# Patient Record
Sex: FEMALE | Race: WHITE | Employment: OTHER | URBAN - METROPOLITAN AREA
[De-identification: names, ages, dates, MRNs, and addresses within clinical notes are randomized per-mention and may not be internally consistent; named-entity substitution may affect disease eponyms.]

---

## 2020-03-23 ENCOUNTER — APPOINTMENT (OUTPATIENT)
Dept: GENERAL RADIOLOGY | Age: 60
End: 2020-03-23
Attending: STUDENT IN AN ORGANIZED HEALTH CARE EDUCATION/TRAINING PROGRAM
Payer: OTHER GOVERNMENT

## 2020-03-23 ENCOUNTER — APPOINTMENT (OUTPATIENT)
Dept: MRI IMAGING | Age: 60
End: 2020-03-23
Attending: STUDENT IN AN ORGANIZED HEALTH CARE EDUCATION/TRAINING PROGRAM
Payer: OTHER GOVERNMENT

## 2020-03-23 ENCOUNTER — HOSPITAL ENCOUNTER (EMERGENCY)
Age: 60
Discharge: HOME | End: 2020-03-23
Attending: STUDENT IN AN ORGANIZED HEALTH CARE EDUCATION/TRAINING PROGRAM
Payer: OTHER GOVERNMENT

## 2020-03-23 VITALS
WEIGHT: 230 LBS | BODY MASS INDEX: 42.33 KG/M2 | HEART RATE: 91 BPM | RESPIRATION RATE: 18 BRPM | HEIGHT: 62 IN | TEMPERATURE: 97.6 F | DIASTOLIC BLOOD PRESSURE: 72 MMHG | OXYGEN SATURATION: 95 % | SYSTOLIC BLOOD PRESSURE: 164 MMHG

## 2020-03-23 DIAGNOSIS — R42 VERTIGO: Primary | ICD-10-CM

## 2020-03-23 LAB
ALBUMIN SERPL-MCNC: 4 G/DL (ref 3.2–4.6)
ALBUMIN/GLOB SERPL: 1.1 {RATIO} (ref 1.2–3.5)
ALP SERPL-CCNC: 36 U/L (ref 50–136)
ALT SERPL-CCNC: 20 U/L (ref 12–65)
ANION GAP SERPL CALC-SCNC: 12 MMOL/L (ref 7–16)
AST SERPL-CCNC: 28 U/L (ref 15–37)
ATRIAL RATE: 64 BPM
BACTERIA URNS QL MICRO: 0 /HPF
BASOPHILS # BLD: 0.1 K/UL (ref 0–0.2)
BASOPHILS NFR BLD: 1 % (ref 0–2)
BILIRUB SERPL-MCNC: 0.4 MG/DL (ref 0.2–1.1)
BUN SERPL-MCNC: 17 MG/DL (ref 8–23)
CALCIUM SERPL-MCNC: 8.6 MG/DL (ref 8.3–10.4)
CALCULATED P AXIS, ECG09: 55 DEGREES
CALCULATED R AXIS, ECG10: 85 DEGREES
CALCULATED T AXIS, ECG11: 66 DEGREES
CASTS URNS QL MICRO: NORMAL /LPF
CHLORIDE SERPL-SCNC: 106 MMOL/L (ref 98–107)
CO2 SERPL-SCNC: 21 MMOL/L (ref 21–32)
CREAT SERPL-MCNC: 0.59 MG/DL (ref 0.6–1)
DIAGNOSIS, 93000: NORMAL
DIFFERENTIAL METHOD BLD: ABNORMAL
EOSINOPHIL # BLD: 0.4 K/UL (ref 0–0.8)
EOSINOPHIL NFR BLD: 3 % (ref 0.5–7.8)
EPI CELLS #/AREA URNS HPF: NORMAL /HPF
ERYTHROCYTE [DISTWIDTH] IN BLOOD BY AUTOMATED COUNT: 11.6 % (ref 11.9–14.6)
GLOBULIN SER CALC-MCNC: 3.5 G/DL (ref 2.3–3.5)
GLUCOSE SERPL-MCNC: 285 MG/DL (ref 65–100)
HCT VFR BLD AUTO: 42.4 % (ref 35.8–46.3)
HGB BLD-MCNC: 14.2 G/DL (ref 11.7–15.4)
IMM GRANULOCYTES # BLD AUTO: 0.1 K/UL (ref 0–0.5)
IMM GRANULOCYTES NFR BLD AUTO: 1 % (ref 0–5)
LIPASE SERPL-CCNC: 109 U/L (ref 73–393)
LYMPHOCYTES # BLD: 2.6 K/UL (ref 0.5–4.6)
LYMPHOCYTES NFR BLD: 18 % (ref 13–44)
MCH RBC QN AUTO: 31.7 PG (ref 26.1–32.9)
MCHC RBC AUTO-ENTMCNC: 33.5 G/DL (ref 31.4–35)
MCV RBC AUTO: 94.6 FL (ref 79.6–97.8)
MONOCYTES # BLD: 0.7 K/UL (ref 0.1–1.3)
MONOCYTES NFR BLD: 4 % (ref 4–12)
NEUTS SEG # BLD: 11.1 K/UL (ref 1.7–8.2)
NEUTS SEG NFR BLD: 74 % (ref 43–78)
NRBC # BLD: 0 K/UL (ref 0–0.2)
P-R INTERVAL, ECG05: 160 MS
PLATELET # BLD AUTO: 279 K/UL (ref 150–450)
PMV BLD AUTO: 9.5 FL (ref 9.4–12.3)
POTASSIUM SERPL-SCNC: 4.8 MMOL/L (ref 3.5–5.1)
PROT SERPL-MCNC: 7.5 G/DL (ref 6.3–8.2)
Q-T INTERVAL, ECG07: 396 MS
QRS DURATION, ECG06: 70 MS
QTC CALCULATION (BEZET), ECG08: 415 MS
RBC # BLD AUTO: 4.48 M/UL (ref 4.05–5.2)
RBC #/AREA URNS HPF: NORMAL /HPF
SODIUM SERPL-SCNC: 139 MMOL/L (ref 136–145)
VENTRICULAR RATE, ECG03: 66 BPM
WBC # BLD AUTO: 15 K/UL (ref 4.3–11.1)
WBC URNS QL MICRO: NORMAL /HPF

## 2020-03-23 PROCEDURE — 96375 TX/PRO/DX INJ NEW DRUG ADDON: CPT

## 2020-03-23 PROCEDURE — 96374 THER/PROPH/DIAG INJ IV PUSH: CPT

## 2020-03-23 PROCEDURE — 83690 ASSAY OF LIPASE: CPT

## 2020-03-23 PROCEDURE — 74011250637 HC RX REV CODE- 250/637: Performed by: STUDENT IN AN ORGANIZED HEALTH CARE EDUCATION/TRAINING PROGRAM

## 2020-03-23 PROCEDURE — 96372 THER/PROPH/DIAG INJ SC/IM: CPT

## 2020-03-23 PROCEDURE — 99284 EMERGENCY DEPT VISIT MOD MDM: CPT

## 2020-03-23 PROCEDURE — 74011000250 HC RX REV CODE- 250: Performed by: STUDENT IN AN ORGANIZED HEALTH CARE EDUCATION/TRAINING PROGRAM

## 2020-03-23 PROCEDURE — 81003 URINALYSIS AUTO W/O SCOPE: CPT

## 2020-03-23 PROCEDURE — 74011250636 HC RX REV CODE- 250/636: Performed by: STUDENT IN AN ORGANIZED HEALTH CARE EDUCATION/TRAINING PROGRAM

## 2020-03-23 PROCEDURE — 71045 X-RAY EXAM CHEST 1 VIEW: CPT

## 2020-03-23 PROCEDURE — 81015 MICROSCOPIC EXAM OF URINE: CPT

## 2020-03-23 PROCEDURE — 80053 COMPREHEN METABOLIC PANEL: CPT

## 2020-03-23 PROCEDURE — 85025 COMPLETE CBC W/AUTO DIFF WBC: CPT

## 2020-03-23 PROCEDURE — 93005 ELECTROCARDIOGRAM TRACING: CPT | Performed by: STUDENT IN AN ORGANIZED HEALTH CARE EDUCATION/TRAINING PROGRAM

## 2020-03-23 PROCEDURE — 70551 MRI BRAIN STEM W/O DYE: CPT

## 2020-03-23 RX ORDER — MECLIZINE HYDROCHLORIDE 25 MG/1
25 TABLET ORAL
Qty: 20 TAB | Refills: 1 | Status: SHIPPED | OUTPATIENT
Start: 2020-03-23

## 2020-03-23 RX ORDER — METOCLOPRAMIDE HYDROCHLORIDE 5 MG/ML
10 INJECTION INTRAMUSCULAR; INTRAVENOUS
Status: COMPLETED | OUTPATIENT
Start: 2020-03-23 | End: 2020-03-23

## 2020-03-23 RX ORDER — ONDANSETRON 4 MG/1
4 TABLET, ORALLY DISINTEGRATING ORAL
Qty: 8 TAB | Refills: 2 | Status: SHIPPED | OUTPATIENT
Start: 2020-03-23

## 2020-03-23 RX ORDER — DIAZEPAM 5 MG/1
5 TABLET ORAL
Status: COMPLETED | OUTPATIENT
Start: 2020-03-23 | End: 2020-03-23

## 2020-03-23 RX ORDER — ONDANSETRON 2 MG/ML
4 INJECTION INTRAMUSCULAR; INTRAVENOUS
Status: COMPLETED | OUTPATIENT
Start: 2020-03-23 | End: 2020-03-23

## 2020-03-23 RX ORDER — PROMETHAZINE HYDROCHLORIDE 25 MG/1
25 SUPPOSITORY RECTAL
Qty: 12 SUPPOSITORY | Refills: 0 | Status: SHIPPED | OUTPATIENT
Start: 2020-03-23 | End: 2020-03-30

## 2020-03-23 RX ORDER — ONDANSETRON 2 MG/ML
8 INJECTION INTRAMUSCULAR; INTRAVENOUS
Status: DISCONTINUED | OUTPATIENT
Start: 2020-03-23 | End: 2020-03-23

## 2020-03-23 RX ADMIN — ONDANSETRON 4 MG: 2 INJECTION INTRAMUSCULAR; INTRAVENOUS at 11:16

## 2020-03-23 RX ADMIN — PROMETHAZINE HYDROCHLORIDE 12.5 MG: 25 INJECTION INTRAMUSCULAR; INTRAVENOUS at 16:45

## 2020-03-23 RX ADMIN — DIAZEPAM 5 MG: 5 TABLET ORAL at 11:58

## 2020-03-23 RX ADMIN — METOCLOPRAMIDE 10 MG: 5 INJECTION, SOLUTION INTRAMUSCULAR; INTRAVENOUS at 13:52

## 2020-03-23 NOTE — ED NOTES
Upon attempting to placed IV into patient, pt yelled \"God damn lady stop that\". IV attempt stopped and this RN explained to patient that profanity towards staff will not be tolerated. Primary RN Pepito Bella aware of the need for IV and blood work.

## 2020-03-23 NOTE — ED NOTES
I have reviewed discharge instructions with the patient. The patient verbalized understanding. Patient left ED via Discharge Method: wheelchair to Home with spouse Opportunity for questions and clarification provided. Patient given 3 scripts. No esign To continue your aftercare when you leave the hospital, you may receive an automated call from our care team to check in on how you are doing. This is a free service and part of our promise to provide the best care and service to meet your aftercare needs.  If you have questions, or wish to unsubscribe from this service please call [phone]. Thank you for Choosing our [facility] Emergency Department.

## 2020-03-23 NOTE — ED PROVIDER NOTES
79-year-old female patient presents with reports of rotational dizziness, lightheadedness nausea and vomiting. Patient states she woke around 9 AM this morning, symptoms started shortly thereafter. She does note that she was initially feeling okay when she woke. Her symptoms are generally worsened with movement and resolved at rest.  She describes both a lightheaded feeling as if she may faint and rotational dizziness described as movement when she is seated tilt. She denies any blurred, black or double vision. She does note jerking motion of her visual field. There is no numbness, tingling or weakness aside from chronic left-sided weakness and sensation change following a stroke many years ago. Patient denies falls or trauma. She denies any trouble speaking. Denies history of vertigo in the past.  Recent outpatient appendectomy 2 weeks ago per her report. No past medical history on file. No past surgical history on file. No family history on file. Social History Socioeconomic History  Marital status:  Spouse name: Not on file  Number of children: Not on file  Years of education: Not on file  Highest education level: Not on file Occupational History  Not on file Social Needs  Financial resource strain: Not on file  Food insecurity Worry: Not on file Inability: Not on file  Transportation needs Medical: Not on file Non-medical: Not on file Tobacco Use  Smoking status: Not on file Substance and Sexual Activity  Alcohol use: Not on file  Drug use: Not on file  Sexual activity: Not on file Lifestyle  Physical activity Days per week: Not on file Minutes per session: Not on file  Stress: Not on file Relationships  Social connections Talks on phone: Not on file Gets together: Not on file Attends Jain service: Not on file Active member of club or organization: Not on file Attends meetings of clubs or organizations: Not on file Relationship status: Not on file  Intimate partner violence Fear of current or ex partner: Not on file Emotionally abused: Not on file Physically abused: Not on file Forced sexual activity: Not on file Other Topics Concern  Not on file Social History Narrative  Not on file ALLERGIES: Contrast agent [iodine] Review of Systems Constitutional: Negative for chills, diaphoresis and fever. HENT: Negative for congestion, sneezing and sore throat. Eyes: Negative for visual disturbance. Respiratory: Negative for cough, chest tightness, shortness of breath and wheezing. Cardiovascular: Negative for chest pain and leg swelling. Gastrointestinal: Positive for nausea and vomiting. Negative for abdominal pain, blood in stool and diarrhea. Endocrine: Negative for polyuria. Genitourinary: Negative for difficulty urinating, dysuria, flank pain, hematuria and urgency. Musculoskeletal: Negative for back pain, myalgias, neck pain and neck stiffness. Skin: Negative for color change and rash. Neurological: Positive for dizziness and light-headedness. Negative for syncope, speech difficulty, weakness, numbness and headaches. Psychiatric/Behavioral: Negative for behavioral problems. All other systems reviewed and are negative. Vitals:  
 03/23/20 1044 BP: 195/73 Pulse: 80 Resp: 18 Temp: 97.6 °F (36.4 °C) SpO2: 93% Weight: 104.3 kg (230 lb) Height: 5' 2\" (1.575 m) Physical Exam 
Vitals signs and nursing note reviewed. Constitutional:   
   General: She is not in acute distress. Appearance: She is well-developed. She is not diaphoretic. Comments: Alert and oriented to person place and time. No acute distress, speaks in clear, fluid sentences. HENT:  
   Head: Normocephalic and atraumatic.   
   Right Ear: External ear normal.  
   Left Ear: External ear normal.  
 Nose: Nose normal.  
Eyes:  
   Extraocular Movements:  
   Right eye: Nystagmus present. Left eye: Nystagmus present. Pupils: Pupils are equal, round, and reactive to light. Comments: Subtle nystagmus noted, horizontal in nature. No vertical or rotational component. Nystagmus is fatigable and resolves on exam.  
Neck: Musculoskeletal: Normal range of motion. Cardiovascular:  
   Rate and Rhythm: Normal rate and regular rhythm. Heart sounds: Normal heart sounds. No murmur. No friction rub. No gallop. Pulmonary:  
   Effort: Pulmonary effort is normal. No respiratory distress. Breath sounds: Normal breath sounds. No stridor. No decreased breath sounds, wheezing, rhonchi or rales. Chest:  
   Chest wall: No tenderness. Abdominal:  
   General: There is no distension. Palpations: Abdomen is soft. There is no mass. Tenderness: There is no abdominal tenderness. There is no guarding or rebound. Hernia: No hernia is present. Musculoskeletal: Normal range of motion. General: No tenderness or deformity. Skin: 
   General: Skin is warm and dry. Neurological:  
   General: No focal deficit present. Mental Status: She is alert and oriented to person, place, and time. GCS: GCS eye subscore is 4. GCS verbal subscore is 5. GCS motor subscore is 6. Cranial Nerves: Cranial nerves are intact. No cranial nerve deficit. Motor: Motor function is intact. Comments: Patient does have some chronic sensation differences over the left upper and lower extremity as well as some subtle weakness. This is appreciated on exam but baseline per patient report. She has no focal neuro deficits that are new today on my exam.  
 
  
 
MDM Number of Diagnoses or Management Options Diagnosis management comments: Symptoms consistent with vertigo.   Most consistent with benign paroxysmal positional vertigo as patient notes resolution of symptoms when at rest, worsened with movement. Describes symptoms coming in [place]". No identified new focal neuro deficit on exam to suggest central cause. We will medicate with Zofran and 1 dose of Valium. Orders placed for basic labs, EKG. AG obtained on arrival shows normal sinus rhythm without evidence of acute ischemia. Underlying artifact present. 11:22 AM 
 
 
  
Amount and/or Complexity of Data Reviewed Clinical lab tests: ordered and reviewed Tests in the medicine section of CPT®: ordered and reviewed Independent visualization of images, tracings, or specimens: yes Risk of Complications, Morbidity, and/or Mortality Presenting problems: moderate Diagnostic procedures: low Management options: moderate Patient Progress Patient progress: stable Procedures

## 2020-03-23 NOTE — DISCHARGE INSTRUCTIONS
Patient Education        Vertigo: Care Instructions  Your Care Instructions    Vertigo is the feeling that you or your surroundings are moving when there is no actual movement. It is often described as a feeling of spinning, whirling, falling, or tilting. Vertigo may make you vomit or feel nauseated. You may have trouble standing or walking and may lose your balance. Vertigo is often related to an inner ear problem, but it can have other more serious causes. If vertigo continues, you may need more tests to find its cause. Follow-up care is a key part of your treatment and safety. Be sure to make and go to all appointments, and call your doctor if you are having problems. It's also a good idea to know your test results and keep a list of the medicines you take. How can you care for yourself at home? · Do not lie flat on your back. Prop yourself up slightly. This may reduce the spinning feeling. Keep your eyes open. · Move slowly so that you do not fall. · If your doctor recommends medicine, take it exactly as directed. · Do not drive while you are having vertigo. Certain exercises, called Allred-Daroff exercises, can help decrease vertigo. To do Allred-Daroff exercises:  · Sit on the edge of a bed or sofa and quickly lie down on the side that causes the worst vertigo. Lie on your side with your ear down. · Stay in this position for at least 30 seconds or until the vertigo goes away. · Sit up. If this causes vertigo, wait for it to stop. · Repeat the procedure on the other side. · Repeat this 10 times. Do these exercises 2 times a day until the vertigo is gone. When should you call for help? Call 911 anytime you think you may need emergency care. For example, call if:    · You passed out (lost consciousness).     · You have symptoms of a stroke. These may include:  ? Sudden numbness, tingling, weakness, or loss of movement in your face, arm, or leg, especially on only one side of your body.   ? Sudden vision changes. ? Sudden trouble speaking. ? Sudden confusion or trouble understanding simple statements. ? Sudden problems with walking or balance. ? A sudden, severe headache that is different from past headaches.    Call your doctor now or seek immediate medical care if:    · Vertigo occurs with a fever, a headache, or ringing in your ears.     · You have new or increased nausea and vomiting.    Watch closely for changes in your health, and be sure to contact your doctor if:    · Vertigo gets worse or happens more often.     · Vertigo has not gotten better after 2 weeks. Where can you learn more? Go to http://emmy-wesley.info/  Enter Q238 in the search box to learn more about \"Vertigo: Care Instructions. \"  Current as of: July 28, 2019Content Version: 12.4  © 6355-4229 [employer]. Care instructions adapted under license by [employer] (which disclaims liability or warranty for this information). If you have questions about a medical condition or this instruction, always ask your healthcare professional. [place] [address] any warranty or liability for your use of this information. Patient Education        Epley Maneuver at Home for Vertigo: Exercises  Introduction  Vertigo is a spinning or whirling sensation when you move your head. Your doctor may have moved you in different positions to help your vertigo get better faster. This is called the Epley maneuver. Your doctor also may have asked you to do these exercises at home. Do the exercises as often as your doctor recommends. If your vertigo is getting worse, your doctor may have you change the exercise or stop it. Step 1  Step 1   1. Sit on the edge of a bed or sofa. Step 2   1. Turn your head 45 degrees in the direction your doctor told you to. This should be toward the ear that causes the most vertigo for you. In this picture, the woman is turning toward her left ear.     Step 3 1. Tilt yourself backward until you are lying on your back. Your head should still be at a 45-degree turn. Your head should be about midway between looking straight ahead and looking out to your side. Hold for 30 seconds. If you have vertigo, stay in this position until it stops. Step 4   1. Turn your head 90 degrees toward the ear that has the least vertigo. In this picture, the woman is turning to the right because she has vertigo on her left side. The point of your chin should be raised and over your shoulder. Hold for 30 seconds. Step 5   1. Roll onto the side with the least vertigo. You should now be looking at the floor. Hold for 30 seconds. Follow-up care is a key part of your treatment and safety. Be sure to make and go to all appointments, and call your doctor if you are having problems. It's also a good idea to know your test results and keep a list of the medicines you take. Where can you learn more? Go to http://emmy-wesley.info/  Enter P834 in the search box to learn more about \"Epley Maneuver at Home for Vertigo: Exercises. \"  Current as of: November 19, 2019Content Version: 12.4  © 3231-4875 [employer]. Care instructions adapted under license by [employer] (which disclaims liability or warranty for this information). If you have questions about a medical condition or this instruction, always ask your healthcare professional. [place] [address] any warranty or liability for your use of this information.

## 2020-03-23 NOTE — ED TRIAGE NOTES
Pt states she woke up feeling dizziness today. Pt states she is dry heaving as well. Pt denies actual vomiting. Denies diarrhea. Denies abd pain unless she is dry heaving. Pt denies recent travel, exposure to someone who has traveled, exposure to someone who is ill, SOB, cough, or Fever.

## 2022-01-01 ENCOUNTER — APPOINTMENT (OUTPATIENT)
Dept: CT IMAGING | Age: 62
DRG: 871 | End: 2022-01-01
Attending: INTERNAL MEDICINE
Payer: OTHER GOVERNMENT

## 2022-01-01 ENCOUNTER — APPOINTMENT (OUTPATIENT)
Dept: GENERAL RADIOLOGY | Age: 62
DRG: 871 | End: 2022-01-01
Attending: INTERNAL MEDICINE
Payer: OTHER GOVERNMENT

## 2022-01-01 ENCOUNTER — APPOINTMENT (OUTPATIENT)
Dept: GENERAL RADIOLOGY | Age: 62
DRG: 871 | End: 2022-01-01
Attending: EMERGENCY MEDICINE
Payer: OTHER GOVERNMENT

## 2022-01-01 ENCOUNTER — APPOINTMENT (OUTPATIENT)
Dept: GENERAL RADIOLOGY | Age: 62
DRG: 871 | End: 2022-01-01
Attending: STUDENT IN AN ORGANIZED HEALTH CARE EDUCATION/TRAINING PROGRAM
Payer: OTHER GOVERNMENT

## 2022-01-01 ENCOUNTER — HOSPITAL ENCOUNTER (INPATIENT)
Age: 62
LOS: 10 days | DRG: 871 | End: 2022-01-22
Attending: EMERGENCY MEDICINE | Admitting: HOSPITALIST
Payer: OTHER GOVERNMENT

## 2022-01-01 ENCOUNTER — APPOINTMENT (OUTPATIENT)
Dept: NUCLEAR MEDICINE | Age: 62
DRG: 871 | End: 2022-01-01
Attending: STUDENT IN AN ORGANIZED HEALTH CARE EDUCATION/TRAINING PROGRAM
Payer: OTHER GOVERNMENT

## 2022-01-01 ENCOUNTER — APPOINTMENT (OUTPATIENT)
Dept: CT IMAGING | Age: 62
DRG: 871 | End: 2022-01-01
Attending: PHYSICIAN ASSISTANT
Payer: OTHER GOVERNMENT

## 2022-01-01 ENCOUNTER — APPOINTMENT (OUTPATIENT)
Dept: CT IMAGING | Age: 62
DRG: 871 | End: 2022-01-01
Attending: STUDENT IN AN ORGANIZED HEALTH CARE EDUCATION/TRAINING PROGRAM
Payer: OTHER GOVERNMENT

## 2022-01-01 VITALS
SYSTOLIC BLOOD PRESSURE: 121 MMHG | BODY MASS INDEX: 36.14 KG/M2 | HEIGHT: 62 IN | TEMPERATURE: 101.9 F | DIASTOLIC BLOOD PRESSURE: 14 MMHG | WEIGHT: 196.4 LBS | OXYGEN SATURATION: 91 %

## 2022-01-01 DIAGNOSIS — N39.0 URINARY TRACT INFECTION WITHOUT HEMATURIA, SITE UNSPECIFIED: ICD-10-CM

## 2022-01-01 DIAGNOSIS — J18.9 PNEUMONIA OF BOTH LUNGS DUE TO INFECTIOUS ORGANISM, UNSPECIFIED PART OF LUNG: ICD-10-CM

## 2022-01-01 DIAGNOSIS — J96.01 ACUTE RESPIRATORY FAILURE WITH HYPOXIA (HCC): ICD-10-CM

## 2022-01-01 DIAGNOSIS — J69.0 ASPIRATION PNEUMONIA, UNSPECIFIED ASPIRATION PNEUMONIA TYPE, UNSPECIFIED LATERALITY, UNSPECIFIED PART OF LUNG (HCC): ICD-10-CM

## 2022-01-01 DIAGNOSIS — E66.01 CLASS 3 SEVERE OBESITY DUE TO EXCESS CALORIES WITH SERIOUS COMORBIDITY AND BODY MASS INDEX (BMI) OF 40.0 TO 44.9 IN ADULT (HCC): Chronic | ICD-10-CM

## 2022-01-01 DIAGNOSIS — A41.9 SEPSIS, DUE TO UNSPECIFIED ORGANISM, UNSPECIFIED WHETHER ACUTE ORGAN DYSFUNCTION PRESENT (HCC): ICD-10-CM

## 2022-01-01 DIAGNOSIS — E87.20 METABOLIC ACIDOSIS: ICD-10-CM

## 2022-01-01 DIAGNOSIS — I46.9 PEA (PULSELESS ELECTRICAL ACTIVITY) (HCC): ICD-10-CM

## 2022-01-01 DIAGNOSIS — S42.202A CLOSED FRACTURE OF PROXIMAL END OF LEFT HUMERUS, UNSPECIFIED FRACTURE MORPHOLOGY, INITIAL ENCOUNTER: ICD-10-CM

## 2022-01-01 DIAGNOSIS — R09.02 HYPOXIA: Primary | ICD-10-CM

## 2022-01-01 DIAGNOSIS — J18.9 COMMUNITY ACQUIRED PNEUMONIA OF LEFT LOWER LOBE OF LUNG: ICD-10-CM

## 2022-01-01 DIAGNOSIS — G93.40 ENCEPHALOPATHY: ICD-10-CM

## 2022-01-01 LAB
1,3 BETA GLUCAN SER-MCNC: <31 PG/ML
ABO + RH BLD: NORMAL
ADMINISTERED INITIALS, ADMINIT: NORMAL
ALBUMIN SERPL-MCNC: 2.5 G/DL (ref 3.2–4.6)
ALBUMIN SERPL-MCNC: 2.7 G/DL (ref 3.2–4.6)
ALBUMIN SERPL-MCNC: 2.9 G/DL (ref 3.2–4.6)
ALBUMIN/GLOB SERPL: 0.6 {RATIO} (ref 1.2–3.5)
ALBUMIN/GLOB SERPL: 0.7 {RATIO} (ref 1.2–3.5)
ALBUMIN/GLOB SERPL: 0.7 {RATIO} (ref 1.2–3.5)
ALP SERPL-CCNC: 63 U/L (ref 50–136)
ALP SERPL-CCNC: 85 U/L (ref 50–136)
ALP SERPL-CCNC: 85 U/L (ref 50–136)
ALT SERPL-CCNC: 12 U/L (ref 12–65)
ALT SERPL-CCNC: 18 U/L (ref 12–65)
ALT SERPL-CCNC: 29 U/L (ref 12–65)
ANION GAP SERPL CALC-SCNC: 10 MMOL/L (ref 7–16)
ANION GAP SERPL CALC-SCNC: 10 MMOL/L (ref 7–16)
ANION GAP SERPL CALC-SCNC: 12 MMOL/L (ref 7–16)
ANION GAP SERPL CALC-SCNC: 14 MMOL/L (ref 7–16)
ANION GAP SERPL CALC-SCNC: 15 MMOL/L (ref 7–16)
ANION GAP SERPL CALC-SCNC: 17 MMOL/L (ref 7–16)
ANION GAP SERPL CALC-SCNC: 18 MMOL/L (ref 7–16)
ANION GAP SERPL CALC-SCNC: 18 MMOL/L (ref 7–16)
ANION GAP SERPL CALC-SCNC: 19 MMOL/L (ref 7–16)
ANION GAP SERPL CALC-SCNC: 20 MMOL/L (ref 7–16)
ANION GAP SERPL CALC-SCNC: 21 MMOL/L (ref 7–16)
ANION GAP SERPL CALC-SCNC: 22 MMOL/L (ref 7–16)
ANION GAP SERPL CALC-SCNC: 23 MMOL/L (ref 7–16)
ANION GAP SERPL CALC-SCNC: 25 MMOL/L (ref 7–16)
ANION GAP SERPL CALC-SCNC: 7 MMOL/L (ref 7–16)
ANION GAP SERPL CALC-SCNC: 7 MMOL/L (ref 7–16)
ANION GAP SERPL CALC-SCNC: 8 MMOL/L (ref 7–16)
ANION GAP SERPL CALC-SCNC: 9 MMOL/L (ref 7–16)
APPEARANCE UR: ABNORMAL
APPEARANCE UR: CLEAR
APTT PPP: 23.7 SEC (ref 24.1–35.1)
ARTERIAL PATENCY WRIST A: POSITIVE
AST SERPL-CCNC: 31 U/L (ref 15–37)
AST SERPL-CCNC: 34 U/L (ref 15–37)
AST SERPL-CCNC: 8 U/L (ref 15–37)
ATRIAL RATE: 93 BPM
BACTERIA SPEC CULT: ABNORMAL
BACTERIA SPEC CULT: ABNORMAL
BACTERIA SPEC CULT: NORMAL
BACTERIA SPEC CULT: NORMAL
BACTERIA URNS QL MICRO: 0 /HPF
BACTERIA URNS QL MICRO: ABNORMAL /HPF
BASE DEFICIT BLD-SCNC: 15.1 MMOL/L
BASE DEFICIT BLD-SCNC: 22.4 MMOL/L
BASE DEFICIT BLD-SCNC: 23 MMOL/L
BASE DEFICIT BLD-SCNC: 6.5 MMOL/L
BASE DEFICIT BLD-SCNC: 8.8 MMOL/L
BASE EXCESS BLD CALC-SCNC: 2.5 MMOL/L
BASE EXCESS BLD CALC-SCNC: 2.6 MMOL/L
BASOPHILS # BLD: 0.1 K/UL (ref 0–0.2)
BASOPHILS NFR BLD: 0 % (ref 0–2)
BDY SITE: ABNORMAL
BILIRUB SERPL-MCNC: 0.3 MG/DL (ref 0.2–1.1)
BILIRUB SERPL-MCNC: 0.4 MG/DL (ref 0.2–1.1)
BILIRUB SERPL-MCNC: 0.8 MG/DL (ref 0.2–1.1)
BILIRUB UR QL: NEGATIVE
BILIRUB UR QL: NEGATIVE
BLOOD GROUP ANTIBODIES SERPL: NORMAL
BUN SERPL-MCNC: 13 MG/DL (ref 8–23)
BUN SERPL-MCNC: 14 MG/DL (ref 8–23)
BUN SERPL-MCNC: 14 MG/DL (ref 8–23)
BUN SERPL-MCNC: 17 MG/DL (ref 8–23)
BUN SERPL-MCNC: 17 MG/DL (ref 8–23)
BUN SERPL-MCNC: 18 MG/DL (ref 8–23)
BUN SERPL-MCNC: 18 MG/DL (ref 8–23)
BUN SERPL-MCNC: 20 MG/DL (ref 8–23)
BUN SERPL-MCNC: 21 MG/DL (ref 8–23)
BUN SERPL-MCNC: 22 MG/DL (ref 8–23)
BUN SERPL-MCNC: 22 MG/DL (ref 8–23)
BUN SERPL-MCNC: 23 MG/DL (ref 8–23)
BUN SERPL-MCNC: 24 MG/DL (ref 8–23)
BUN SERPL-MCNC: 25 MG/DL (ref 8–23)
BUN SERPL-MCNC: 26 MG/DL (ref 8–23)
BUN SERPL-MCNC: 27 MG/DL (ref 8–23)
BUN SERPL-MCNC: 31 MG/DL (ref 8–23)
BUN SERPL-MCNC: 35 MG/DL (ref 8–23)
BUN SERPL-MCNC: 47 MG/DL (ref 8–23)
CA-I BLD-MCNC: 1.21 MMOL/L (ref 1.12–1.32)
CALCIUM SERPL-MCNC: 8.4 MG/DL (ref 8.3–10.4)
CALCIUM SERPL-MCNC: 8.5 MG/DL (ref 8.3–10.4)
CALCIUM SERPL-MCNC: 8.6 MG/DL (ref 8.3–10.4)
CALCIUM SERPL-MCNC: 8.7 MG/DL (ref 8.3–10.4)
CALCIUM SERPL-MCNC: 8.8 MG/DL (ref 8.3–10.4)
CALCIUM SERPL-MCNC: 8.9 MG/DL (ref 8.3–10.4)
CALCIUM SERPL-MCNC: 9 MG/DL (ref 8.3–10.4)
CALCIUM SERPL-MCNC: 9 MG/DL (ref 8.3–10.4)
CALCIUM SERPL-MCNC: 9.1 MG/DL (ref 8.3–10.4)
CALCIUM SERPL-MCNC: 9.2 MG/DL (ref 8.3–10.4)
CALCIUM SERPL-MCNC: 9.2 MG/DL (ref 8.3–10.4)
CALCIUM SERPL-MCNC: 9.3 MG/DL (ref 8.3–10.4)
CALCIUM SERPL-MCNC: 9.5 MG/DL (ref 8.3–10.4)
CALCIUM SERPL-MCNC: 9.5 MG/DL (ref 8.3–10.4)
CALCULATED P AXIS, ECG09: 58 DEGREES
CALCULATED R AXIS, ECG10: 124 DEGREES
CALCULATED T AXIS, ECG11: 64 DEGREES
CASTS URNS QL MICRO: 0 /LPF
CASTS URNS QL MICRO: ABNORMAL /LPF
CHLORIDE SERPL-SCNC: 102 MMOL/L (ref 98–107)
CHLORIDE SERPL-SCNC: 106 MMOL/L (ref 98–107)
CHLORIDE SERPL-SCNC: 106 MMOL/L (ref 98–107)
CHLORIDE SERPL-SCNC: 108 MMOL/L (ref 98–107)
CHLORIDE SERPL-SCNC: 109 MMOL/L (ref 98–107)
CHLORIDE SERPL-SCNC: 110 MMOL/L (ref 98–107)
CHLORIDE SERPL-SCNC: 110 MMOL/L (ref 98–107)
CHLORIDE SERPL-SCNC: 111 MMOL/L (ref 98–107)
CHLORIDE SERPL-SCNC: 111 MMOL/L (ref 98–107)
CHLORIDE SERPL-SCNC: 112 MMOL/L (ref 98–107)
CHLORIDE SERPL-SCNC: 113 MMOL/L (ref 98–107)
CHLORIDE SERPL-SCNC: 115 MMOL/L (ref 98–107)
CHLORIDE SERPL-SCNC: 115 MMOL/L (ref 98–107)
CHLORIDE SERPL-SCNC: 116 MMOL/L (ref 98–107)
CHLORIDE SERPL-SCNC: 116 MMOL/L (ref 98–107)
CHLORIDE SERPL-SCNC: 117 MMOL/L (ref 98–107)
CHLORIDE SERPL-SCNC: 119 MMOL/L (ref 98–107)
CO2 BLD-SCNC: 16 MMOL/L (ref 13–23)
CO2 BLD-SCNC: 6 MMOL/L (ref 13–23)
CO2 SERPL-SCNC: 10 MMOL/L (ref 21–32)
CO2 SERPL-SCNC: 11 MMOL/L (ref 21–32)
CO2 SERPL-SCNC: 12 MMOL/L (ref 21–32)
CO2 SERPL-SCNC: 12 MMOL/L (ref 21–32)
CO2 SERPL-SCNC: 13 MMOL/L (ref 21–32)
CO2 SERPL-SCNC: 15 MMOL/L (ref 21–32)
CO2 SERPL-SCNC: 18 MMOL/L (ref 21–32)
CO2 SERPL-SCNC: 19 MMOL/L (ref 21–32)
CO2 SERPL-SCNC: 20 MMOL/L (ref 21–32)
CO2 SERPL-SCNC: 21 MMOL/L (ref 21–32)
CO2 SERPL-SCNC: 21 MMOL/L (ref 21–32)
CO2 SERPL-SCNC: 25 MMOL/L (ref 21–32)
CO2 SERPL-SCNC: 26 MMOL/L (ref 21–32)
CO2 SERPL-SCNC: 27 MMOL/L (ref 21–32)
CO2 SERPL-SCNC: 28 MMOL/L (ref 21–32)
CO2 SERPL-SCNC: 28 MMOL/L (ref 21–32)
CO2 SERPL-SCNC: 29 MMOL/L (ref 21–32)
CO2 SERPL-SCNC: 6 MMOL/L (ref 21–32)
CO2 SERPL-SCNC: 7 MMOL/L (ref 21–32)
COLOR UR: YELLOW
COLOR UR: YELLOW
COVID-19 RAPID TEST, COVR: NOT DETECTED
CREAT SERPL-MCNC: 0.31 MG/DL (ref 0.6–1)
CREAT SERPL-MCNC: 0.38 MG/DL (ref 0.6–1)
CREAT SERPL-MCNC: 0.42 MG/DL (ref 0.6–1)
CREAT SERPL-MCNC: 0.49 MG/DL (ref 0.6–1)
CREAT SERPL-MCNC: 0.53 MG/DL (ref 0.6–1)
CREAT SERPL-MCNC: 0.57 MG/DL (ref 0.6–1)
CREAT SERPL-MCNC: 0.59 MG/DL (ref 0.6–1)
CREAT SERPL-MCNC: 0.6 MG/DL (ref 0.6–1)
CREAT SERPL-MCNC: 0.63 MG/DL (ref 0.6–1)
CREAT SERPL-MCNC: 0.63 MG/DL (ref 0.6–1)
CREAT SERPL-MCNC: 0.65 MG/DL (ref 0.6–1)
CREAT SERPL-MCNC: 0.68 MG/DL (ref 0.6–1)
CREAT SERPL-MCNC: 0.7 MG/DL (ref 0.6–1)
CREAT SERPL-MCNC: 0.7 MG/DL (ref 0.6–1)
CREAT SERPL-MCNC: 0.72 MG/DL (ref 0.6–1)
CREAT SERPL-MCNC: 0.76 MG/DL (ref 0.6–1)
CREAT SERPL-MCNC: 0.79 MG/DL (ref 0.6–1)
CREAT SERPL-MCNC: 0.82 MG/DL (ref 0.6–1)
CREAT SERPL-MCNC: 0.84 MG/DL (ref 0.6–1)
CREAT SERPL-MCNC: 0.84 MG/DL (ref 0.6–1)
CREAT SERPL-MCNC: 0.86 MG/DL (ref 0.6–1)
CREAT SERPL-MCNC: 0.9 MG/DL (ref 0.6–1)
CREAT SERPL-MCNC: 0.94 MG/DL (ref 0.6–1)
CRP SERPL-MCNC: 0.5 MG/DL (ref 0–0.9)
CRP SERPL-MCNC: 1.4 MG/DL (ref 0–0.9)
CRYSTALS URNS QL MICRO: ABNORMAL /LPF
D DIMER PPP FEU-MCNC: 1.88 UG/ML(FEU)
D DIMER PPP FEU-MCNC: 5.65 UG/ML(FEU)
D50 ADMINISTERED, D50ADM: 0 ML
D50 ORDER, D50ORD: 0 ML
DIAGNOSIS, 93000: NORMAL
DIFFERENTIAL METHOD BLD: ABNORMAL
EOSINOPHIL # BLD: 0.1 K/UL (ref 0–0.8)
EOSINOPHIL NFR BLD: 1 % (ref 0.5–7.8)
EPI CELLS #/AREA URNS HPF: 0 /HPF
EPI CELLS #/AREA URNS HPF: ABNORMAL /HPF
ERYTHROCYTE [DISTWIDTH] IN BLOOD BY AUTOMATED COUNT: 14.2 % (ref 11.9–14.6)
ERYTHROCYTE [DISTWIDTH] IN BLOOD BY AUTOMATED COUNT: 14.5 % (ref 11.9–14.6)
ERYTHROCYTE [DISTWIDTH] IN BLOOD BY AUTOMATED COUNT: 14.6 % (ref 11.9–14.6)
ERYTHROCYTE [DISTWIDTH] IN BLOOD BY AUTOMATED COUNT: 14.7 % (ref 11.9–14.6)
ERYTHROCYTE [DISTWIDTH] IN BLOOD BY AUTOMATED COUNT: 14.7 % (ref 11.9–14.6)
ERYTHROCYTE [DISTWIDTH] IN BLOOD BY AUTOMATED COUNT: 14.8 % (ref 11.9–14.6)
ERYTHROCYTE [DISTWIDTH] IN BLOOD BY AUTOMATED COUNT: 14.9 % (ref 11.9–14.6)
ERYTHROCYTE [DISTWIDTH] IN BLOOD BY AUTOMATED COUNT: 15 % (ref 11.9–14.6)
ERYTHROCYTE [DISTWIDTH] IN BLOOD BY AUTOMATED COUNT: 15 % (ref 11.9–14.6)
ERYTHROCYTE [DISTWIDTH] IN BLOOD BY AUTOMATED COUNT: 15.1 % (ref 11.9–14.6)
ERYTHROCYTE [DISTWIDTH] IN BLOOD BY AUTOMATED COUNT: 16.8 % (ref 11.9–14.6)
EST. AVERAGE GLUCOSE BLD GHB EST-MCNC: 154 MG/DL
FERRITIN SERPL-MCNC: 130 NG/ML (ref 8–388)
FIO2, L/MIN - FIO2P: 2
FIO2, L/MIN - FIO2P: 3
FIO2, L/MIN - FIO2P: 3
FIO2, L/MIN - FIO2P: 4
GALACTOMANNAN AG SPEC IA-ACNC: 0.08 INDEX (ref 0–0.49)
GAS FLOW.O2 O2 DELIVERY SYS: ABNORMAL L/MIN
GLOBULIN SER CALC-MCNC: 3.9 G/DL (ref 2.3–3.5)
GLOBULIN SER CALC-MCNC: 4 G/DL (ref 2.3–3.5)
GLOBULIN SER CALC-MCNC: 4.3 G/DL (ref 2.3–3.5)
GLSCOM COMMENTS: NORMAL
GLUCOSE BLD STRIP.AUTO-MCNC: 107 MG/DL (ref 65–100)
GLUCOSE BLD STRIP.AUTO-MCNC: 128 MG/DL (ref 65–100)
GLUCOSE BLD STRIP.AUTO-MCNC: 130 MG/DL (ref 65–100)
GLUCOSE BLD STRIP.AUTO-MCNC: 134 MG/DL (ref 65–100)
GLUCOSE BLD STRIP.AUTO-MCNC: 137 MG/DL (ref 65–100)
GLUCOSE BLD STRIP.AUTO-MCNC: 140 MG/DL (ref 65–100)
GLUCOSE BLD STRIP.AUTO-MCNC: 143 MG/DL (ref 65–100)
GLUCOSE BLD STRIP.AUTO-MCNC: 145 MG/DL (ref 65–100)
GLUCOSE BLD STRIP.AUTO-MCNC: 147 MG/DL (ref 65–100)
GLUCOSE BLD STRIP.AUTO-MCNC: 147 MG/DL (ref 65–100)
GLUCOSE BLD STRIP.AUTO-MCNC: 148 MG/DL (ref 65–100)
GLUCOSE BLD STRIP.AUTO-MCNC: 152 MG/DL (ref 65–100)
GLUCOSE BLD STRIP.AUTO-MCNC: 153 MG/DL (ref 65–100)
GLUCOSE BLD STRIP.AUTO-MCNC: 153 MG/DL (ref 65–100)
GLUCOSE BLD STRIP.AUTO-MCNC: 154 MG/DL (ref 65–100)
GLUCOSE BLD STRIP.AUTO-MCNC: 155 MG/DL (ref 65–100)
GLUCOSE BLD STRIP.AUTO-MCNC: 156 MG/DL (ref 65–100)
GLUCOSE BLD STRIP.AUTO-MCNC: 157 MG/DL (ref 65–100)
GLUCOSE BLD STRIP.AUTO-MCNC: 161 MG/DL (ref 65–100)
GLUCOSE BLD STRIP.AUTO-MCNC: 161 MG/DL (ref 65–100)
GLUCOSE BLD STRIP.AUTO-MCNC: 162 MG/DL (ref 65–100)
GLUCOSE BLD STRIP.AUTO-MCNC: 163 MG/DL (ref 65–100)
GLUCOSE BLD STRIP.AUTO-MCNC: 163 MG/DL (ref 65–100)
GLUCOSE BLD STRIP.AUTO-MCNC: 164 MG/DL (ref 65–100)
GLUCOSE BLD STRIP.AUTO-MCNC: 165 MG/DL (ref 65–100)
GLUCOSE BLD STRIP.AUTO-MCNC: 165 MG/DL (ref 65–100)
GLUCOSE BLD STRIP.AUTO-MCNC: 166 MG/DL (ref 65–100)
GLUCOSE BLD STRIP.AUTO-MCNC: 167 MG/DL (ref 65–100)
GLUCOSE BLD STRIP.AUTO-MCNC: 169 MG/DL (ref 65–100)
GLUCOSE BLD STRIP.AUTO-MCNC: 170 MG/DL (ref 65–100)
GLUCOSE BLD STRIP.AUTO-MCNC: 170 MG/DL (ref 65–100)
GLUCOSE BLD STRIP.AUTO-MCNC: 171 MG/DL (ref 65–100)
GLUCOSE BLD STRIP.AUTO-MCNC: 173 MG/DL (ref 65–100)
GLUCOSE BLD STRIP.AUTO-MCNC: 173 MG/DL (ref 65–100)
GLUCOSE BLD STRIP.AUTO-MCNC: 174 MG/DL (ref 65–100)
GLUCOSE BLD STRIP.AUTO-MCNC: 176 MG/DL (ref 65–100)
GLUCOSE BLD STRIP.AUTO-MCNC: 177 MG/DL (ref 65–100)
GLUCOSE BLD STRIP.AUTO-MCNC: 178 MG/DL (ref 65–100)
GLUCOSE BLD STRIP.AUTO-MCNC: 178 MG/DL (ref 65–100)
GLUCOSE BLD STRIP.AUTO-MCNC: 179 MG/DL (ref 65–100)
GLUCOSE BLD STRIP.AUTO-MCNC: 179 MG/DL (ref 65–100)
GLUCOSE BLD STRIP.AUTO-MCNC: 180 MG/DL (ref 65–100)
GLUCOSE BLD STRIP.AUTO-MCNC: 180 MG/DL (ref 65–100)
GLUCOSE BLD STRIP.AUTO-MCNC: 181 MG/DL (ref 65–100)
GLUCOSE BLD STRIP.AUTO-MCNC: 181 MG/DL (ref 65–100)
GLUCOSE BLD STRIP.AUTO-MCNC: 182 MG/DL (ref 65–100)
GLUCOSE BLD STRIP.AUTO-MCNC: 182 MG/DL (ref 65–100)
GLUCOSE BLD STRIP.AUTO-MCNC: 185 MG/DL (ref 65–100)
GLUCOSE BLD STRIP.AUTO-MCNC: 187 MG/DL (ref 65–100)
GLUCOSE BLD STRIP.AUTO-MCNC: 188 MG/DL (ref 65–100)
GLUCOSE BLD STRIP.AUTO-MCNC: 188 MG/DL (ref 65–100)
GLUCOSE BLD STRIP.AUTO-MCNC: 192 MG/DL (ref 65–100)
GLUCOSE BLD STRIP.AUTO-MCNC: 193 MG/DL (ref 65–100)
GLUCOSE BLD STRIP.AUTO-MCNC: 194 MG/DL (ref 65–100)
GLUCOSE BLD STRIP.AUTO-MCNC: 194 MG/DL (ref 65–100)
GLUCOSE BLD STRIP.AUTO-MCNC: 195 MG/DL (ref 65–100)
GLUCOSE BLD STRIP.AUTO-MCNC: 196 MG/DL (ref 65–100)
GLUCOSE BLD STRIP.AUTO-MCNC: 199 MG/DL (ref 65–100)
GLUCOSE BLD STRIP.AUTO-MCNC: 203 MG/DL (ref 65–100)
GLUCOSE BLD STRIP.AUTO-MCNC: 203 MG/DL (ref 65–100)
GLUCOSE BLD STRIP.AUTO-MCNC: 204 MG/DL (ref 65–100)
GLUCOSE BLD STRIP.AUTO-MCNC: 205 MG/DL (ref 65–100)
GLUCOSE BLD STRIP.AUTO-MCNC: 208 MG/DL (ref 65–100)
GLUCOSE BLD STRIP.AUTO-MCNC: 209 MG/DL (ref 65–100)
GLUCOSE BLD STRIP.AUTO-MCNC: 212 MG/DL (ref 65–100)
GLUCOSE BLD STRIP.AUTO-MCNC: 215 MG/DL (ref 65–100)
GLUCOSE BLD STRIP.AUTO-MCNC: 216 MG/DL (ref 65–100)
GLUCOSE BLD STRIP.AUTO-MCNC: 217 MG/DL (ref 65–100)
GLUCOSE BLD STRIP.AUTO-MCNC: 219 MG/DL (ref 65–100)
GLUCOSE BLD STRIP.AUTO-MCNC: 223 MG/DL (ref 65–100)
GLUCOSE BLD STRIP.AUTO-MCNC: 225 MG/DL (ref 65–100)
GLUCOSE BLD STRIP.AUTO-MCNC: 227 MG/DL (ref 65–100)
GLUCOSE BLD STRIP.AUTO-MCNC: 227 MG/DL (ref 65–100)
GLUCOSE BLD STRIP.AUTO-MCNC: 228 MG/DL (ref 65–100)
GLUCOSE BLD STRIP.AUTO-MCNC: 232 MG/DL (ref 65–100)
GLUCOSE BLD STRIP.AUTO-MCNC: 242 MG/DL (ref 65–100)
GLUCOSE BLD STRIP.AUTO-MCNC: 244 MG/DL (ref 65–100)
GLUCOSE BLD STRIP.AUTO-MCNC: 249 MG/DL (ref 65–100)
GLUCOSE BLD STRIP.AUTO-MCNC: 253 MG/DL (ref 65–100)
GLUCOSE BLD STRIP.AUTO-MCNC: 259 MG/DL (ref 65–100)
GLUCOSE BLD STRIP.AUTO-MCNC: 261 MG/DL (ref 65–100)
GLUCOSE BLD STRIP.AUTO-MCNC: 280 MG/DL (ref 65–100)
GLUCOSE BLD STRIP.AUTO-MCNC: 294 MG/DL (ref 65–100)
GLUCOSE BLD STRIP.AUTO-MCNC: 341 MG/DL (ref 65–100)
GLUCOSE SERPL-MCNC: 149 MG/DL (ref 65–100)
GLUCOSE SERPL-MCNC: 150 MG/DL (ref 65–100)
GLUCOSE SERPL-MCNC: 164 MG/DL (ref 65–100)
GLUCOSE SERPL-MCNC: 167 MG/DL (ref 65–100)
GLUCOSE SERPL-MCNC: 184 MG/DL (ref 65–100)
GLUCOSE SERPL-MCNC: 187 MG/DL (ref 65–100)
GLUCOSE SERPL-MCNC: 193 MG/DL (ref 65–100)
GLUCOSE SERPL-MCNC: 195 MG/DL (ref 65–100)
GLUCOSE SERPL-MCNC: 195 MG/DL (ref 65–100)
GLUCOSE SERPL-MCNC: 196 MG/DL (ref 65–100)
GLUCOSE SERPL-MCNC: 199 MG/DL (ref 65–100)
GLUCOSE SERPL-MCNC: 199 MG/DL (ref 65–100)
GLUCOSE SERPL-MCNC: 202 MG/DL (ref 65–100)
GLUCOSE SERPL-MCNC: 205 MG/DL (ref 65–100)
GLUCOSE SERPL-MCNC: 223 MG/DL (ref 65–100)
GLUCOSE SERPL-MCNC: 245 MG/DL (ref 65–100)
GLUCOSE SERPL-MCNC: 250 MG/DL (ref 65–100)
GLUCOSE SERPL-MCNC: 253 MG/DL (ref 65–100)
GLUCOSE SERPL-MCNC: 259 MG/DL (ref 65–100)
GLUCOSE SERPL-MCNC: 268 MG/DL (ref 65–100)
GLUCOSE SERPL-MCNC: 280 MG/DL (ref 65–100)
GLUCOSE SERPL-MCNC: 317 MG/DL (ref 65–100)
GLUCOSE SERPL-MCNC: 356 MG/DL (ref 65–100)
GLUCOSE UR STRIP.AUTO-MCNC: >1000 MG/DL
GLUCOSE UR STRIP.AUTO-MCNC: >1000 MG/DL
GLUCOSE, GLC: 137 MG/DL
GLUCOSE, GLC: 147 MG/DL
GLUCOSE, GLC: 147 MG/DL
GLUCOSE, GLC: 155 MG/DL
GLUCOSE, GLC: 157 MG/DL
GLUCOSE, GLC: 161 MG/DL
GLUCOSE, GLC: 165 MG/DL
GLUCOSE, GLC: 170 MG/DL
GLUCOSE, GLC: 170 MG/DL
GLUCOSE, GLC: 171 MG/DL
GLUCOSE, GLC: 173 MG/DL
GLUCOSE, GLC: 174 MG/DL
GLUCOSE, GLC: 177 MG/DL
GLUCOSE, GLC: 178 MG/DL
GLUCOSE, GLC: 178 MG/DL
GLUCOSE, GLC: 179 MG/DL
GLUCOSE, GLC: 180 MG/DL
GLUCOSE, GLC: 180 MG/DL
GLUCOSE, GLC: 181 MG/DL
GLUCOSE, GLC: 182 MG/DL
GLUCOSE, GLC: 182 MG/DL
GLUCOSE, GLC: 185 MG/DL
GLUCOSE, GLC: 185 MG/DL
GLUCOSE, GLC: 187 MG/DL
GLUCOSE, GLC: 188 MG/DL
GLUCOSE, GLC: 188 MG/DL
GLUCOSE, GLC: 194 MG/DL
GLUCOSE, GLC: 194 MG/DL
GLUCOSE, GLC: 195 MG/DL
GLUCOSE, GLC: 196 MG/DL
GLUCOSE, GLC: 199 MG/DL
GLUCOSE, GLC: 203 MG/DL
GLUCOSE, GLC: 205 MG/DL
GLUCOSE, GLC: 208 MG/DL
GLUCOSE, GLC: 209 MG/DL
GLUCOSE, GLC: 212 MG/DL
GLUCOSE, GLC: 219 MG/DL
GLUCOSE, GLC: 223 MG/DL
GLUCOSE, GLC: 225 MG/DL
GLUCOSE, GLC: 249 MG/DL
GLUCOSE, GLC: 253 MG/DL
GLUCOSE, GLC: 261 MG/DL
GLUCOSE, GLC: 294 MG/DL
HBA1C MFR BLD: 7 % (ref 4.2–6.3)
HCO3 BLD-SCNC: 12.2 MMOL/L (ref 22–26)
HCO3 BLD-SCNC: 15.3 MMOL/L (ref 22–26)
HCO3 BLD-SCNC: 24.9 MMOL/L (ref 22–26)
HCO3 BLD-SCNC: 25.2 MMOL/L (ref 22–26)
HCO3 BLD-SCNC: 4.1 MMOL/L (ref 22–26)
HCO3 BLD-SCNC: 4.7 MMOL/L (ref 22–26)
HCO3 BLD-SCNC: 7.2 MMOL/L (ref 22–26)
HCT VFR BLD AUTO: 37.8 % (ref 35.8–46.3)
HCT VFR BLD AUTO: 38.1 % (ref 35.8–46.3)
HCT VFR BLD AUTO: 38.4 % (ref 35.8–46.3)
HCT VFR BLD AUTO: 38.4 % (ref 35.8–46.3)
HCT VFR BLD AUTO: 39.2 % (ref 35.8–46.3)
HCT VFR BLD AUTO: 39.3 % (ref 35.8–46.3)
HCT VFR BLD AUTO: 39.7 % (ref 35.8–46.3)
HCT VFR BLD AUTO: 42.2 % (ref 35.8–46.3)
HCT VFR BLD AUTO: 42.8 % (ref 35.8–46.3)
HCT VFR BLD AUTO: 43.5 % (ref 35.8–46.3)
HCT VFR BLD AUTO: 46.3 % (ref 35.8–46.3)
HCT VFR BLD AUTO: 50.1 % (ref 35.8–46.3)
HGB BLD-MCNC: 11.7 G/DL (ref 11.7–15.4)
HGB BLD-MCNC: 12 G/DL (ref 11.7–15.4)
HGB BLD-MCNC: 12.2 G/DL (ref 11.7–15.4)
HGB BLD-MCNC: 12.4 G/DL (ref 11.7–15.4)
HGB BLD-MCNC: 12.5 G/DL (ref 11.7–15.4)
HGB BLD-MCNC: 12.6 G/DL (ref 11.7–15.4)
HGB BLD-MCNC: 12.8 G/DL (ref 11.7–15.4)
HGB BLD-MCNC: 13.3 G/DL (ref 11.7–15.4)
HGB BLD-MCNC: 13.3 G/DL (ref 11.7–15.4)
HGB BLD-MCNC: 13.5 G/DL (ref 11.7–15.4)
HGB BLD-MCNC: 14 G/DL (ref 11.7–15.4)
HGB BLD-MCNC: 15.3 G/DL (ref 11.7–15.4)
HGB UR QL STRIP: NEGATIVE
HGB UR QL STRIP: NEGATIVE
HIGH TARGET, HITG: 250 MG/DL
IMM GRANULOCYTES # BLD AUTO: 0.1 K/UL (ref 0–0.5)
IMM GRANULOCYTES NFR BLD AUTO: 1 % (ref 0–5)
INSPIRATION.DURATION SETTING TIME VENT: 0.9 SEC
INSPIRATION.DURATION SETTING TIME VENT: 0.9 SEC
INSULIN ADMINSTERED, INSADM: 0.9 UNITS/HOUR
INSULIN ADMINSTERED, INSADM: 0.9 UNITS/HOUR
INSULIN ADMINSTERED, INSADM: 1 UNITS/HOUR
INSULIN ADMINSTERED, INSADM: 1 UNITS/HOUR
INSULIN ADMINSTERED, INSADM: 1.1 UNITS/HOUR
INSULIN ADMINSTERED, INSADM: 1.2 UNITS/HOUR
INSULIN ADMINSTERED, INSADM: 1.3 UNITS/HOUR
INSULIN ADMINSTERED, INSADM: 1.4 UNITS/HOUR
INSULIN ADMINSTERED, INSADM: 1.4 UNITS/HOUR
INSULIN ADMINSTERED, INSADM: 1.5 UNITS/HOUR
INSULIN ADMINSTERED, INSADM: 1.9 UNITS/HOUR
INSULIN ADMINSTERED, INSADM: 2.3 UNITS/HOUR
INSULIN ADMINSTERED, INSADM: 2.3 UNITS/HOUR
INSULIN ADMINSTERED, INSADM: 2.4 UNITS/HOUR
INSULIN ADMINSTERED, INSADM: 2.5 UNITS/HOUR
INSULIN ADMINSTERED, INSADM: 2.6 UNITS/HOUR
INSULIN ADMINSTERED, INSADM: 2.7 UNITS/HOUR
INSULIN ADMINSTERED, INSADM: 2.8 UNITS/HOUR
INSULIN ADMINSTERED, INSADM: 2.9 UNITS/HOUR
INSULIN ADMINSTERED, INSADM: 2.9 UNITS/HOUR
INSULIN ADMINSTERED, INSADM: 3 UNITS/HOUR
INSULIN ADMINSTERED, INSADM: 3 UNITS/HOUR
INSULIN ADMINSTERED, INSADM: 3.2 UNITS/HOUR
INSULIN ADMINSTERED, INSADM: 3.3 UNITS/HOUR
INSULIN ADMINSTERED, INSADM: 3.8 UNITS/HOUR
INSULIN ADMINSTERED, INSADM: 3.9 UNITS/HOUR
INSULIN ADMINSTERED, INSADM: 4.6 UNITS/HOUR
INSULIN ADMINSTERED, INSADM: 4.7 UNITS/HOUR
INSULIN ADMINSTERED, INSADM: 4.9 UNITS/HOUR
INSULIN ADMINSTERED, INSADM: 6 UNITS/HOUR
INSULIN ORDER, INSORD: 0.9 UNITS/HOUR
INSULIN ORDER, INSORD: 0.9 UNITS/HOUR
INSULIN ORDER, INSORD: 1 UNITS/HOUR
INSULIN ORDER, INSORD: 1 UNITS/HOUR
INSULIN ORDER, INSORD: 1.1 UNITS/HOUR
INSULIN ORDER, INSORD: 1.2 UNITS/HOUR
INSULIN ORDER, INSORD: 1.3 UNITS/HOUR
INSULIN ORDER, INSORD: 1.4 UNITS/HOUR
INSULIN ORDER, INSORD: 1.4 UNITS/HOUR
INSULIN ORDER, INSORD: 1.5 UNITS/HOUR
INSULIN ORDER, INSORD: 1.9 UNITS/HOUR
INSULIN ORDER, INSORD: 2.3 UNITS/HOUR
INSULIN ORDER, INSORD: 2.3 UNITS/HOUR
INSULIN ORDER, INSORD: 2.4 UNITS/HOUR
INSULIN ORDER, INSORD: 2.5 UNITS/HOUR
INSULIN ORDER, INSORD: 2.6 UNITS/HOUR
INSULIN ORDER, INSORD: 2.7 UNITS/HOUR
INSULIN ORDER, INSORD: 2.8 UNITS/HOUR
INSULIN ORDER, INSORD: 2.9 UNITS/HOUR
INSULIN ORDER, INSORD: 2.9 UNITS/HOUR
INSULIN ORDER, INSORD: 3 UNITS/HOUR
INSULIN ORDER, INSORD: 3 UNITS/HOUR
INSULIN ORDER, INSORD: 3.2 UNITS/HOUR
INSULIN ORDER, INSORD: 3.3 UNITS/HOUR
INSULIN ORDER, INSORD: 3.8 UNITS/HOUR
INSULIN ORDER, INSORD: 3.9 UNITS/HOUR
INSULIN ORDER, INSORD: 4.6 UNITS/HOUR
INSULIN ORDER, INSORD: 4.7 UNITS/HOUR
INSULIN ORDER, INSORD: 4.9 UNITS/HOUR
INSULIN ORDER, INSORD: 6 UNITS/HOUR
KETONES UR QL STRIP.AUTO: >80 MG/DL
KETONES UR QL STRIP.AUTO: ABNORMAL MG/DL
LACTATE SERPL-SCNC: 0.9 MMOL/L (ref 0.4–2)
LACTATE SERPL-SCNC: 1 MMOL/L (ref 0.4–2)
LACTATE SERPL-SCNC: 1.8 MMOL/L (ref 0.4–2)
LEUKOCYTE ESTERASE UR QL STRIP.AUTO: ABNORMAL
LEUKOCYTE ESTERASE UR QL STRIP.AUTO: NEGATIVE
LOW TARGET, LOT: 150 MG/DL
LYMPHOCYTES # BLD: 2.4 K/UL (ref 0.5–4.6)
LYMPHOCYTES NFR BLD: 16 % (ref 13–44)
MAGNESIUM SERPL-MCNC: 1.7 MG/DL (ref 1.8–2.4)
MAGNESIUM SERPL-MCNC: 1.8 MG/DL (ref 1.8–2.4)
MAGNESIUM SERPL-MCNC: 1.8 MG/DL (ref 1.8–2.4)
MAGNESIUM SERPL-MCNC: 1.9 MG/DL (ref 1.8–2.4)
MAGNESIUM SERPL-MCNC: 2 MG/DL (ref 1.8–2.4)
MAGNESIUM SERPL-MCNC: 2 MG/DL (ref 1.8–2.4)
MAGNESIUM SERPL-MCNC: 2.1 MG/DL (ref 1.8–2.4)
MAGNESIUM SERPL-MCNC: 2.5 MG/DL (ref 1.8–2.4)
MAGNESIUM SERPL-MCNC: 2.6 MG/DL (ref 1.8–2.4)
MAGNESIUM SERPL-MCNC: 2.8 MG/DL (ref 1.8–2.4)
MAGNESIUM SERPL-MCNC: 2.8 MG/DL (ref 1.8–2.4)
MAGNESIUM SERPL-MCNC: 2.9 MG/DL (ref 1.8–2.4)
MCH RBC QN AUTO: 26.5 PG (ref 26.1–32.9)
MCH RBC QN AUTO: 26.5 PG (ref 26.1–32.9)
MCH RBC QN AUTO: 26.7 PG (ref 26.1–32.9)
MCH RBC QN AUTO: 27 PG (ref 26.1–32.9)
MCH RBC QN AUTO: 27.1 PG (ref 26.1–32.9)
MCH RBC QN AUTO: 27.1 PG (ref 26.1–32.9)
MCH RBC QN AUTO: 27.2 PG (ref 26.1–32.9)
MCH RBC QN AUTO: 27.3 PG (ref 26.1–32.9)
MCH RBC QN AUTO: 27.5 PG (ref 26.1–32.9)
MCHC RBC AUTO-ENTMCNC: 30.2 G/DL (ref 31.4–35)
MCHC RBC AUTO-ENTMCNC: 30.5 G/DL (ref 31.4–35)
MCHC RBC AUTO-ENTMCNC: 30.5 G/DL (ref 31.4–35)
MCHC RBC AUTO-ENTMCNC: 31 G/DL (ref 31.4–35)
MCHC RBC AUTO-ENTMCNC: 31.1 G/DL (ref 31.4–35)
MCHC RBC AUTO-ENTMCNC: 31.5 G/DL (ref 31.4–35)
MCHC RBC AUTO-ENTMCNC: 31.7 G/DL (ref 31.4–35)
MCHC RBC AUTO-ENTMCNC: 31.7 G/DL (ref 31.4–35)
MCHC RBC AUTO-ENTMCNC: 31.8 G/DL (ref 31.4–35)
MCHC RBC AUTO-ENTMCNC: 32 G/DL (ref 31.4–35)
MCHC RBC AUTO-ENTMCNC: 32.3 G/DL (ref 31.4–35)
MCV RBC AUTO: 83.8 FL (ref 79.6–97.8)
MCV RBC AUTO: 85 FL (ref 79.6–97.8)
MCV RBC AUTO: 85.3 FL (ref 79.6–97.8)
MCV RBC AUTO: 85.4 FL (ref 79.6–97.8)
MCV RBC AUTO: 85.6 FL (ref 79.6–97.8)
MCV RBC AUTO: 85.8 FL (ref 79.6–97.8)
MCV RBC AUTO: 86.5 FL (ref 79.6–97.8)
MCV RBC AUTO: 86.9 FL (ref 79.6–97.8)
MCV RBC AUTO: 87.7 FL (ref 79.6–97.8)
MCV RBC AUTO: 88.4 FL (ref 79.6–97.8)
MCV RBC AUTO: 89.3 FL (ref 79.6–97.8)
MINUTES UNTIL NEXT BG, NBG: 60 MIN
MM INDURATION POC: 0 MM (ref 0–5)
MM INDURATION POC: 0 MM (ref 0–5)
MM INDURATION POC: NORMAL (ref 0–5)
MONOCYTES # BLD: 1.2 K/UL (ref 0.1–1.3)
MONOCYTES NFR BLD: 8 % (ref 4–12)
MUCOUS THREADS URNS QL MICRO: ABNORMAL /LPF
MULTIPLIER, MUL: 0.01
MULTIPLIER, MUL: 0.02
MULTIPLIER, MUL: 0.03
NEUTS SEG # BLD: 10.8 K/UL (ref 1.7–8.2)
NEUTS SEG NFR BLD: 74 % (ref 43–78)
NITRITE UR QL STRIP.AUTO: NEGATIVE
NITRITE UR QL STRIP.AUTO: POSITIVE
NRBC # BLD: 0 K/UL (ref 0–0.2)
O2/TOTAL GAS SETTING VFR VENT: 24 %
O2/TOTAL GAS SETTING VFR VENT: 30 %
O2/TOTAL GAS SETTING VFR VENT: 30 %
ORDER INITIALS, ORDINIT: NORMAL
OTHER OBSERVATIONS,UCOM: ABNORMAL
P-R INTERVAL, ECG05: 177 MS
PCO2 BLD: 12.8 MMHG (ref 35–45)
PCO2 BLD: 13 MMHG (ref 35–45)
PCO2 BLD: 14.5 MMHG (ref 35–45)
PCO2 BLD: 17.4 MMHG (ref 35–45)
PCO2 BLD: 21.6 MMHG (ref 35–45)
PCO2 BLD: 30.8 MMHG (ref 35–45)
PCO2 BLD: 32.2 MMHG (ref 35–45)
PEEP RESPIRATORY: 8 CMH2O
PH BLD: 7.11 [PH] (ref 7.35–7.45)
PH BLD: 7.12 [PH] (ref 7.35–7.45)
PH BLD: 7.36 [PH] (ref 7.35–7.45)
PH BLD: 7.45 [PH] (ref 7.35–7.45)
PH BLD: 7.46 [PH] (ref 7.35–7.45)
PH BLD: 7.5 [PH] (ref 7.35–7.45)
PH BLD: 7.51 [PH] (ref 7.35–7.45)
PH UR STRIP: 5 [PH] (ref 5–9)
PH UR STRIP: 5.5 [PH] (ref 5–9)
PHOSPHATE SERPL-MCNC: 2.2 MG/DL (ref 2.3–3.7)
PHOSPHATE SERPL-MCNC: 2.7 MG/DL (ref 2.3–3.7)
PHOSPHATE SERPL-MCNC: 2.8 MG/DL (ref 2.3–3.7)
PHOSPHATE SERPL-MCNC: 3.1 MG/DL (ref 2.3–3.7)
PHOSPHATE SERPL-MCNC: 3.4 MG/DL (ref 2.3–3.7)
PHOSPHATE SERPL-MCNC: 4 MG/DL (ref 2.3–3.7)
PIP ISTAT,IPIP: 14
PIP ISTAT,IPIP: 16
PLATELET # BLD AUTO: 347 K/UL (ref 150–450)
PLATELET # BLD AUTO: 373 K/UL (ref 150–450)
PLATELET # BLD AUTO: 374 K/UL (ref 150–450)
PLATELET # BLD AUTO: 378 K/UL (ref 150–450)
PLATELET # BLD AUTO: 382 K/UL (ref 150–450)
PLATELET # BLD AUTO: 434 K/UL (ref 150–450)
PLATELET # BLD AUTO: 461 K/UL (ref 150–450)
PLATELET # BLD AUTO: 469 K/UL (ref 150–450)
PLATELET # BLD AUTO: 497 K/UL (ref 150–450)
PLATELET # BLD AUTO: 497 K/UL (ref 150–450)
PLATELET # BLD AUTO: 521 K/UL (ref 150–450)
PMV BLD AUTO: 10 FL (ref 9.4–12.3)
PMV BLD AUTO: 8.3 FL (ref 9.4–12.3)
PMV BLD AUTO: 8.4 FL (ref 9.4–12.3)
PMV BLD AUTO: 8.4 FL (ref 9.4–12.3)
PMV BLD AUTO: 8.5 FL (ref 9.4–12.3)
PMV BLD AUTO: 8.7 FL (ref 9.4–12.3)
PMV BLD AUTO: 9.1 FL (ref 9.4–12.3)
PMV BLD AUTO: 9.2 FL (ref 9.4–12.3)
PMV BLD AUTO: 9.4 FL (ref 9.4–12.3)
PMV BLD AUTO: 9.7 FL (ref 9.4–12.3)
PMV BLD AUTO: 9.8 FL (ref 9.4–12.3)
PO2 BLD: 112 MMHG (ref 75–100)
PO2 BLD: 117 MMHG (ref 75–100)
PO2 BLD: 124 MMHG (ref 75–100)
PO2 BLD: 142 MMHG (ref 75–100)
PO2 BLD: 81 MMHG (ref 75–100)
PO2 BLD: 83 MMHG (ref 75–100)
PO2 BLD: 96 MMHG (ref 75–100)
POTASSIUM BLD-SCNC: 3.2 MMOL/L (ref 3.5–5.1)
POTASSIUM SERPL-SCNC: 2.7 MMOL/L (ref 3.5–5.1)
POTASSIUM SERPL-SCNC: 2.8 MMOL/L (ref 3.5–5.1)
POTASSIUM SERPL-SCNC: 2.9 MMOL/L (ref 3.5–5.1)
POTASSIUM SERPL-SCNC: 3 MMOL/L (ref 3.5–5.1)
POTASSIUM SERPL-SCNC: 3.1 MMOL/L (ref 3.5–5.1)
POTASSIUM SERPL-SCNC: 3.2 MMOL/L (ref 3.5–5.1)
POTASSIUM SERPL-SCNC: 3.3 MMOL/L (ref 3.5–5.1)
POTASSIUM SERPL-SCNC: 3.4 MMOL/L (ref 3.5–5.1)
POTASSIUM SERPL-SCNC: 3.5 MMOL/L (ref 3.5–5.1)
POTASSIUM SERPL-SCNC: 3.7 MMOL/L (ref 3.5–5.1)
POTASSIUM SERPL-SCNC: 4 MMOL/L (ref 3.5–5.1)
POTASSIUM SERPL-SCNC: 4.1 MMOL/L (ref 3.5–5.1)
POTASSIUM SERPL-SCNC: 4.1 MMOL/L (ref 3.5–5.1)
POTASSIUM SERPL-SCNC: 4.6 MMOL/L (ref 3.5–5.1)
POTASSIUM SERPL-SCNC: 5.1 MMOL/L (ref 3.5–5.1)
PPD POC: NEGATIVE NEGATIVE
PPD POC: NEGATIVE NEGATIVE
PPD POC: NORMAL
PRESSURE SUPPORT SETTING VENT: 14 CMH2O
PROCALCITONIN SERPL-MCNC: <0.05 NG/ML (ref 0–0.49)
PROCALCITONIN SERPL-MCNC: <0.05 NG/ML (ref 0–0.49)
PROT SERPL-MCNC: 6.6 G/DL (ref 6.3–8.2)
PROT SERPL-MCNC: 6.8 G/DL (ref 6.3–8.2)
PROT SERPL-MCNC: 6.9 G/DL (ref 6.3–8.2)
PROT UR STRIP-MCNC: 30 MG/DL
PROT UR STRIP-MCNC: NEGATIVE MG/DL
Q-T INTERVAL, ECG07: 355 MS
QRS DURATION, ECG06: 92 MS
QTC CALCULATION (BEZET), ECG08: 444 MS
RBC # BLD AUTO: 4.37 M/UL (ref 4.05–5.2)
RBC # BLD AUTO: 4.42 M/UL (ref 4.05–5.2)
RBC # BLD AUTO: 4.48 M/UL (ref 4.05–5.2)
RBC # BLD AUTO: 4.58 M/UL (ref 4.05–5.2)
RBC # BLD AUTO: 4.58 M/UL (ref 4.05–5.2)
RBC # BLD AUTO: 4.65 M/UL (ref 4.05–5.2)
RBC # BLD AUTO: 4.88 M/UL (ref 4.05–5.2)
RBC # BLD AUTO: 4.93 M/UL (ref 4.05–5.2)
RBC # BLD AUTO: 5.1 M/UL (ref 4.05–5.2)
RBC # BLD AUTO: 5.24 M/UL (ref 4.05–5.2)
RBC # BLD AUTO: 5.61 M/UL (ref 4.05–5.2)
RBC #/AREA URNS HPF: ABNORMAL /HPF
RBC #/AREA URNS HPF: ABNORMAL /HPF
SAO2 % BLD: 96.7 % (ref 95–98)
SAO2 % BLD: 97 %
SAO2 % BLD: 97.1 % (ref 95–98)
SAO2 % BLD: 98.1 % (ref 95–98)
SAO2 % BLD: 98.3 % (ref 95–98)
SAO2 % BLD: 98.6 % (ref 95–98)
SAO2 % BLD: 99.2 % (ref 95–98)
SARS-COV-2, COV2: DETECTED
SARS-COV-2, COV2: NORMAL
SERVICE CMNT-IMP: ABNORMAL
SERVICE CMNT-IMP: NORMAL
SERVICE CMNT-IMP: NORMAL
SODIUM BLD-SCNC: 149 MMOL/L (ref 136–145)
SODIUM SERPL-SCNC: 135 MMOL/L (ref 136–145)
SODIUM SERPL-SCNC: 137 MMOL/L (ref 136–145)
SODIUM SERPL-SCNC: 138 MMOL/L (ref 136–145)
SODIUM SERPL-SCNC: 143 MMOL/L (ref 136–145)
SODIUM SERPL-SCNC: 143 MMOL/L (ref 136–145)
SODIUM SERPL-SCNC: 144 MMOL/L (ref 136–145)
SODIUM SERPL-SCNC: 145 MMOL/L (ref 136–145)
SODIUM SERPL-SCNC: 145 MMOL/L (ref 136–145)
SODIUM SERPL-SCNC: 146 MMOL/L (ref 136–145)
SODIUM SERPL-SCNC: 146 MMOL/L (ref 136–145)
SODIUM SERPL-SCNC: 147 MMOL/L (ref 136–145)
SODIUM SERPL-SCNC: 148 MMOL/L (ref 136–145)
SODIUM SERPL-SCNC: 149 MMOL/L (ref 136–145)
SODIUM SERPL-SCNC: 149 MMOL/L (ref 136–145)
SODIUM SERPL-SCNC: 150 MMOL/L (ref 136–145)
SODIUM SERPL-SCNC: 151 MMOL/L (ref 136–145)
SODIUM SERPL-SCNC: 151 MMOL/L (ref 136–145)
SODIUM SERPL-SCNC: 152 MMOL/L (ref 136–145)
SODIUM SERPL-SCNC: 152 MMOL/L (ref 136–145)
SODIUM SERPL-SCNC: 153 MMOL/L (ref 136–145)
SOURCE, COVRS: NORMAL
SP GR UR REFRACTOMETRY: 1.03 (ref 1–1.02)
SP GR UR REFRACTOMETRY: 1.05 (ref 1–1.02)
SPECIMEN EXP DATE BLD: NORMAL
SPECIMEN SITE: ABNORMAL
SPECIMEN SOURCE, FCOV2M: ABNORMAL
SPECIMEN TYPE: ABNORMAL
TOTAL RESP. RATE, ITRR: 19
TOTAL RESP. RATE, ITRR: 34
TRIGL SERPL-MCNC: 167 MG/DL (ref 35–150)
TRIGL SERPL-MCNC: 197 MG/DL (ref 35–150)
UROBILINOGEN UR QL STRIP.AUTO: 0.2 EU/DL (ref 0.2–1)
UROBILINOGEN UR QL STRIP.AUTO: 1 EU/DL (ref 0.2–1)
VENTILATION MODE VENT: ABNORMAL
VENTILATION MODE VENT: ABNORMAL
VENTRICULAR RATE, ECG03: 94 BPM
WBC # BLD AUTO: 11.6 K/UL (ref 4.3–11.1)
WBC # BLD AUTO: 11.7 K/UL (ref 4.3–11.1)
WBC # BLD AUTO: 11.8 K/UL (ref 4.3–11.1)
WBC # BLD AUTO: 12.2 K/UL (ref 4.3–11.1)
WBC # BLD AUTO: 14.4 K/UL (ref 4.3–11.1)
WBC # BLD AUTO: 14.6 K/UL (ref 4.3–11.1)
WBC # BLD AUTO: 15.3 K/UL (ref 4.3–11.1)
WBC # BLD AUTO: 16.9 K/UL (ref 4.3–11.1)
WBC # BLD AUTO: 17 K/UL (ref 4.3–11.1)
WBC # BLD AUTO: 17.1 K/UL (ref 4.3–11.1)
WBC # BLD AUTO: 17.7 K/UL (ref 4.3–11.1)
WBC URNS QL MICRO: ABNORMAL /HPF
WBC URNS QL MICRO: ABNORMAL /HPF
YEAST URNS QL MICRO: ABNORMAL

## 2022-01-01 PROCEDURE — 82803 BLOOD GASES ANY COMBINATION: CPT

## 2022-01-01 PROCEDURE — 80053 COMPREHEN METABOLIC PANEL: CPT

## 2022-01-01 PROCEDURE — 74011000250 HC RX REV CODE- 250: Performed by: HOSPITALIST

## 2022-01-01 PROCEDURE — 74011250636 HC RX REV CODE- 250/636: Performed by: INTERNAL MEDICINE

## 2022-01-01 PROCEDURE — 80048 BASIC METABOLIC PNL TOTAL CA: CPT

## 2022-01-01 PROCEDURE — 36415 COLL VENOUS BLD VENIPUNCTURE: CPT

## 2022-01-01 PROCEDURE — 83735 ASSAY OF MAGNESIUM: CPT

## 2022-01-01 PROCEDURE — 74011636637 HC RX REV CODE- 636/637: Performed by: INTERNAL MEDICINE

## 2022-01-01 PROCEDURE — 94660 CPAP INITIATION&MGMT: CPT

## 2022-01-01 PROCEDURE — 92526 ORAL FUNCTION THERAPY: CPT

## 2022-01-01 PROCEDURE — 84100 ASSAY OF PHOSPHORUS: CPT

## 2022-01-01 PROCEDURE — 71045 X-RAY EXAM CHEST 1 VIEW: CPT

## 2022-01-01 PROCEDURE — 85730 THROMBOPLASTIN TIME PARTIAL: CPT

## 2022-01-01 PROCEDURE — 82947 ASSAY GLUCOSE BLOOD QUANT: CPT

## 2022-01-01 PROCEDURE — 74011636637 HC RX REV CODE- 636/637: Performed by: STUDENT IN AN ORGANIZED HEALTH CARE EDUCATION/TRAINING PROGRAM

## 2022-01-01 PROCEDURE — 74011250637 HC RX REV CODE- 250/637: Performed by: INTERNAL MEDICINE

## 2022-01-01 PROCEDURE — 2W3BX1Z IMMOBILIZATION OF LEFT UPPER ARM USING SPLINT: ICD-10-PCS | Performed by: EMERGENCY MEDICINE

## 2022-01-01 PROCEDURE — 82962 GLUCOSE BLOOD TEST: CPT

## 2022-01-01 PROCEDURE — 85027 COMPLETE CBC AUTOMATED: CPT

## 2022-01-01 PROCEDURE — 74011250636 HC RX REV CODE- 250/636: Performed by: HOSPITALIST

## 2022-01-01 PROCEDURE — 74011250637 HC RX REV CODE- 250/637: Performed by: HOSPITALIST

## 2022-01-01 PROCEDURE — 77030020847 HC STATLOK BARD -A

## 2022-01-01 PROCEDURE — 74011250636 HC RX REV CODE- 250/636: Performed by: FAMILY MEDICINE

## 2022-01-01 PROCEDURE — C9113 INJ PANTOPRAZOLE SODIUM, VIA: HCPCS | Performed by: INTERNAL MEDICINE

## 2022-01-01 PROCEDURE — 74011636637 HC RX REV CODE- 636/637: Performed by: HOSPITALIST

## 2022-01-01 PROCEDURE — 74011000250 HC RX REV CODE- 250: Performed by: INTERNAL MEDICINE

## 2022-01-01 PROCEDURE — 87088 URINE BACTERIA CULTURE: CPT

## 2022-01-01 PROCEDURE — 70450 CT HEAD/BRAIN W/O DYE: CPT

## 2022-01-01 PROCEDURE — 96365 THER/PROPH/DIAG IV INF INIT: CPT

## 2022-01-01 PROCEDURE — 65610000006 HC RM INTENSIVE CARE

## 2022-01-01 PROCEDURE — 2709999900 HC NON-CHARGEABLE SUPPLY

## 2022-01-01 PROCEDURE — 77030041974 HC CATH SYS PERIPH TELE -B

## 2022-01-01 PROCEDURE — 5A09457 ASSISTANCE WITH RESPIRATORY VENTILATION, 24-96 CONSECUTIVE HOURS, CONTINUOUS POSITIVE AIRWAY PRESSURE: ICD-10-PCS | Performed by: INTERNAL MEDICINE

## 2022-01-01 PROCEDURE — 94760 N-INVAS EAR/PLS OXIMETRY 1: CPT

## 2022-01-01 PROCEDURE — 77010033678 HC OXYGEN DAILY

## 2022-01-01 PROCEDURE — 94640 AIRWAY INHALATION TREATMENT: CPT

## 2022-01-01 PROCEDURE — U0005 INFEC AGEN DETEC AMPLI PROBE: HCPCS

## 2022-01-01 PROCEDURE — 93005 ELECTROCARDIOGRAM TRACING: CPT | Performed by: STUDENT IN AN ORGANIZED HEALTH CARE EDUCATION/TRAINING PROGRAM

## 2022-01-01 PROCEDURE — 87449 NOS EACH ORGANISM AG IA: CPT

## 2022-01-01 PROCEDURE — 36600 WITHDRAWAL OF ARTERIAL BLOOD: CPT

## 2022-01-01 PROCEDURE — 85379 FIBRIN DEGRADATION QUANT: CPT

## 2022-01-01 PROCEDURE — 73030 X-RAY EXAM OF SHOULDER: CPT

## 2022-01-01 PROCEDURE — 74011000258 HC RX REV CODE- 258: Performed by: INTERNAL MEDICINE

## 2022-01-01 PROCEDURE — 31500 INSERT EMERGENCY AIRWAY: CPT | Performed by: INTERNAL MEDICINE

## 2022-01-01 PROCEDURE — 99285 EMERGENCY DEPT VISIT HI MDM: CPT

## 2022-01-01 PROCEDURE — 97530 THERAPEUTIC ACTIVITIES: CPT

## 2022-01-01 PROCEDURE — 87635 SARS-COV-2 COVID-19 AMP PRB: CPT

## 2022-01-01 PROCEDURE — 87086 URINE CULTURE/COLONY COUNT: CPT

## 2022-01-01 PROCEDURE — 73502 X-RAY EXAM HIP UNI 2-3 VIEWS: CPT

## 2022-01-01 PROCEDURE — 36592 COLLECT BLOOD FROM PICC: CPT

## 2022-01-01 PROCEDURE — 87186 SC STD MICRODIL/AGAR DIL: CPT

## 2022-01-01 PROCEDURE — 99232 SBSQ HOSP IP/OBS MODERATE 35: CPT | Performed by: INTERNAL MEDICINE

## 2022-01-01 PROCEDURE — 65270000029 HC RM PRIVATE

## 2022-01-01 PROCEDURE — 87040 BLOOD CULTURE FOR BACTERIA: CPT

## 2022-01-01 PROCEDURE — 74011000250 HC RX REV CODE- 250: Performed by: FAMILY MEDICINE

## 2022-01-01 PROCEDURE — 74011000258 HC RX REV CODE- 258: Performed by: STUDENT IN AN ORGANIZED HEALTH CARE EDUCATION/TRAINING PROGRAM

## 2022-01-01 PROCEDURE — 0BH17EZ INSERTION OF ENDOTRACHEAL AIRWAY INTO TRACHEA, VIA NATURAL OR ARTIFICIAL OPENING: ICD-10-PCS | Performed by: INTERNAL MEDICINE

## 2022-01-01 PROCEDURE — 92610 EVALUATE SWALLOWING FUNCTION: CPT

## 2022-01-01 PROCEDURE — 74011000258 HC RX REV CODE- 258: Performed by: FAMILY MEDICINE

## 2022-01-01 PROCEDURE — 92950 HEART/LUNG RESUSCITATION CPR: CPT | Performed by: INTERNAL MEDICINE

## 2022-01-01 PROCEDURE — 74011000250 HC RX REV CODE- 250: Performed by: STUDENT IN AN ORGANIZED HEALTH CARE EDUCATION/TRAINING PROGRAM

## 2022-01-01 PROCEDURE — 83605 ASSAY OF LACTIC ACID: CPT

## 2022-01-01 PROCEDURE — 86580 TB INTRADERMAL TEST: CPT | Performed by: INTERNAL MEDICINE

## 2022-01-01 PROCEDURE — A9540 TC99M MAA: HCPCS

## 2022-01-01 PROCEDURE — 84295 ASSAY OF SERUM SODIUM: CPT

## 2022-01-01 PROCEDURE — 96367 TX/PROPH/DG ADDL SEQ IV INF: CPT

## 2022-01-01 PROCEDURE — 84132 ASSAY OF SERUM POTASSIUM: CPT

## 2022-01-01 PROCEDURE — 0BJ08ZZ INSPECTION OF TRACHEOBRONCHIAL TREE, VIA NATURAL OR ARTIFICIAL OPENING ENDOSCOPIC: ICD-10-PCS | Performed by: INTERNAL MEDICINE

## 2022-01-01 PROCEDURE — 83036 HEMOGLOBIN GLYCOSYLATED A1C: CPT

## 2022-01-01 PROCEDURE — 74011250636 HC RX REV CODE- 250/636: Performed by: STUDENT IN AN ORGANIZED HEALTH CARE EDUCATION/TRAINING PROGRAM

## 2022-01-01 PROCEDURE — 74011636637 HC RX REV CODE- 636/637: Performed by: FAMILY MEDICINE

## 2022-01-01 PROCEDURE — 84478 ASSAY OF TRIGLYCERIDES: CPT

## 2022-01-01 PROCEDURE — 84145 PROCALCITONIN (PCT): CPT

## 2022-01-01 PROCEDURE — 93005 ELECTROCARDIOGRAM TRACING: CPT | Performed by: EMERGENCY MEDICINE

## 2022-01-01 PROCEDURE — 85025 COMPLETE CBC W/AUTO DIFF WBC: CPT

## 2022-01-01 PROCEDURE — 36591 DRAW BLOOD OFF VENOUS DEVICE: CPT

## 2022-01-01 PROCEDURE — 97162 PT EVAL MOD COMPLEX 30 MIN: CPT

## 2022-01-01 PROCEDURE — 31500 INSERT EMERGENCY AIRWAY: CPT

## 2022-01-01 PROCEDURE — 99223 1ST HOSP IP/OBS HIGH 75: CPT | Performed by: INTERNAL MEDICINE

## 2022-01-01 PROCEDURE — 99291 CRITICAL CARE FIRST HOUR: CPT | Performed by: INTERNAL MEDICINE

## 2022-01-01 PROCEDURE — 94664 DEMO&/EVAL PT USE INHALER: CPT

## 2022-01-01 PROCEDURE — 36556 INSERT NON-TUNNEL CV CATH: CPT | Performed by: INTERNAL MEDICINE

## 2022-01-01 PROCEDURE — 73200 CT UPPER EXTREMITY W/O DYE: CPT

## 2022-01-01 PROCEDURE — 65660000000 HC RM CCU STEPDOWN

## 2022-01-01 PROCEDURE — 87305 ASPERGILLUS AG IA: CPT

## 2022-01-01 PROCEDURE — 02HV33Z INSERTION OF INFUSION DEVICE INTO SUPERIOR VENA CAVA, PERCUTANEOUS APPROACH: ICD-10-PCS | Performed by: INTERNAL MEDICINE

## 2022-01-01 PROCEDURE — 81001 URINALYSIS AUTO W/SCOPE: CPT

## 2022-01-01 PROCEDURE — 86140 C-REACTIVE PROTEIN: CPT

## 2022-01-01 PROCEDURE — 92950 HEART/LUNG RESUSCITATION CPR: CPT

## 2022-01-01 PROCEDURE — 74018 RADEX ABDOMEN 1 VIEW: CPT

## 2022-01-01 PROCEDURE — 86900 BLOOD TYPING SEROLOGIC ABO: CPT

## 2022-01-01 PROCEDURE — 31645 BRNCHSC W/THER ASPIR 1ST: CPT | Performed by: INTERNAL MEDICINE

## 2022-01-01 PROCEDURE — 74011250637 HC RX REV CODE- 250/637: Performed by: STUDENT IN AN ORGANIZED HEALTH CARE EDUCATION/TRAINING PROGRAM

## 2022-01-01 PROCEDURE — 85018 HEMOGLOBIN: CPT

## 2022-01-01 PROCEDURE — 82728 ASSAY OF FERRITIN: CPT

## 2022-01-01 PROCEDURE — 65620000000 HC RM CCU GENERAL

## 2022-01-01 PROCEDURE — 74011000250 HC RX REV CODE- 250: Performed by: EMERGENCY MEDICINE

## 2022-01-01 PROCEDURE — 74011000258 HC RX REV CODE- 258: Performed by: HOSPITALIST

## 2022-01-01 PROCEDURE — 99233 SBSQ HOSP IP/OBS HIGH 50: CPT | Performed by: INTERNAL MEDICINE

## 2022-01-01 PROCEDURE — 76937 US GUIDE VASCULAR ACCESS: CPT

## 2022-01-01 RX ORDER — ATORVASTATIN CALCIUM 10 MG/1
20 TABLET, FILM COATED ORAL DAILY
Status: DISCONTINUED | OUTPATIENT
Start: 2022-01-01 | End: 2022-01-01 | Stop reason: HOSPADM

## 2022-01-01 RX ORDER — OMEPRAZOLE 20 MG/1
20 CAPSULE, DELAYED RELEASE ORAL DAILY
COMMUNITY

## 2022-01-01 RX ORDER — EPINEPHRINE 0.1 MG/ML
INJECTION INTRACARDIAC; INTRAVENOUS
Status: COMPLETED | OUTPATIENT
Start: 2022-01-01 | End: 2022-01-01

## 2022-01-01 RX ORDER — POLYETHYLENE GLYCOL 3350 17 G/17G
17 POWDER, FOR SOLUTION ORAL DAILY
Status: DISCONTINUED | OUTPATIENT
Start: 2022-01-01 | End: 2022-01-01 | Stop reason: HOSPADM

## 2022-01-01 RX ORDER — METOPROLOL TARTRATE 5 MG/5ML
5 INJECTION INTRAVENOUS
Status: DISCONTINUED | OUTPATIENT
Start: 2022-01-01 | End: 2022-01-01 | Stop reason: HOSPADM

## 2022-01-01 RX ORDER — DEXTROSE MONOHYDRATE 50 MG/ML
50 INJECTION, SOLUTION INTRAVENOUS CONTINUOUS
Status: DISCONTINUED | OUTPATIENT
Start: 2022-01-01 | End: 2022-01-01

## 2022-01-01 RX ORDER — MAGNESIUM SULFATE HEPTAHYDRATE 40 MG/ML
2 INJECTION, SOLUTION INTRAVENOUS ONCE
Status: COMPLETED | OUTPATIENT
Start: 2022-01-01 | End: 2022-01-01

## 2022-01-01 RX ORDER — IPRATROPIUM BROMIDE AND ALBUTEROL SULFATE 2.5; .5 MG/3ML; MG/3ML
3 SOLUTION RESPIRATORY (INHALATION)
Status: COMPLETED | OUTPATIENT
Start: 2022-01-01 | End: 2022-01-01

## 2022-01-01 RX ORDER — DEXTROSE MONOHYDRATE 50 MG/ML
151 INJECTION, SOLUTION INTRAVENOUS CONTINUOUS
Status: DISPENSED | OUTPATIENT
Start: 2022-01-01 | End: 2022-01-01

## 2022-01-01 RX ORDER — INSULIN LISPRO 100 [IU]/ML
INJECTION, SOLUTION INTRAVENOUS; SUBCUTANEOUS EVERY 4 HOURS
Status: DISCONTINUED | OUTPATIENT
Start: 2022-01-01 | End: 2022-01-01

## 2022-01-01 RX ORDER — POTASSIUM CHLORIDE 14.9 MG/ML
20 INJECTION INTRAVENOUS
Status: COMPLETED | OUTPATIENT
Start: 2022-01-01 | End: 2022-01-01

## 2022-01-01 RX ORDER — NALOXONE HYDROCHLORIDE 4 MG/.1ML
SPRAY NASAL
Qty: 2 EACH | Refills: 1 | Status: SHIPPED | OUTPATIENT
Start: 2022-01-01

## 2022-01-01 RX ORDER — FLUTICASONE PROPIONATE 50 MCG
2 SPRAY, SUSPENSION (ML) NASAL DAILY
COMMUNITY

## 2022-01-01 RX ORDER — ACETAMINOPHEN 650 MG/1
650 SUPPOSITORY RECTAL
Status: DISCONTINUED | OUTPATIENT
Start: 2022-01-01 | End: 2022-01-01 | Stop reason: HOSPADM

## 2022-01-01 RX ORDER — ADHESIVE BANDAGE
30 BANDAGE TOPICAL DAILY PRN
Status: DISCONTINUED | OUTPATIENT
Start: 2022-01-01 | End: 2022-01-01 | Stop reason: HOSPADM

## 2022-01-01 RX ORDER — SODIUM BICARBONATE 1 MEQ/ML
50 SYRINGE (ML) INTRAVENOUS ONCE
Status: COMPLETED | OUTPATIENT
Start: 2022-01-01 | End: 2022-01-01

## 2022-01-01 RX ORDER — DEXAMETHASONE SODIUM PHOSPHATE 100 MG/10ML
6 INJECTION INTRAMUSCULAR; INTRAVENOUS EVERY 24 HOURS
Status: DISCONTINUED | OUTPATIENT
Start: 2022-01-01 | End: 2022-01-01

## 2022-01-01 RX ORDER — PANTOPRAZOLE SODIUM 40 MG/1
40 TABLET, DELAYED RELEASE ORAL
Status: DISCONTINUED | OUTPATIENT
Start: 2022-01-01 | End: 2022-01-01

## 2022-01-01 RX ORDER — LISINOPRIL 20 MG/1
20 TABLET ORAL DAILY
COMMUNITY

## 2022-01-01 RX ORDER — MORPHINE SULFATE 2 MG/ML
2 INJECTION, SOLUTION INTRAMUSCULAR; INTRAVENOUS
Status: DISCONTINUED | OUTPATIENT
Start: 2022-01-01 | End: 2022-01-01 | Stop reason: HOSPADM

## 2022-01-01 RX ORDER — INSULIN GLARGINE 100 [IU]/ML
22 INJECTION, SOLUTION SUBCUTANEOUS DAILY
Status: DISCONTINUED | OUTPATIENT
Start: 2022-01-01 | End: 2022-01-01

## 2022-01-01 RX ORDER — AMLODIPINE BESYLATE 5 MG/1
5 TABLET ORAL DAILY
COMMUNITY

## 2022-01-01 RX ORDER — PREGABALIN 300 MG/1
300 CAPSULE ORAL 2 TIMES DAILY
COMMUNITY

## 2022-01-01 RX ORDER — AMITRIPTYLINE HYDROCHLORIDE 75 MG/1
25 TABLET, FILM COATED ORAL
COMMUNITY

## 2022-01-01 RX ORDER — SUCRALFATE 1 G/1
1 TABLET ORAL
Status: DISCONTINUED | OUTPATIENT
Start: 2022-01-01 | End: 2022-01-01

## 2022-01-01 RX ORDER — MONTELUKAST SODIUM 10 MG/1
10 TABLET ORAL DAILY
COMMUNITY

## 2022-01-01 RX ORDER — ONDANSETRON 4 MG/1
4 TABLET, ORALLY DISINTEGRATING ORAL
Status: DISCONTINUED | OUTPATIENT
Start: 2022-01-01 | End: 2022-01-01 | Stop reason: HOSPADM

## 2022-01-01 RX ORDER — DEXTROSE MONOHYDRATE 100 MG/ML
125-250 INJECTION, SOLUTION INTRAVENOUS AS NEEDED
Status: DISCONTINUED | OUTPATIENT
Start: 2022-01-01 | End: 2022-01-01

## 2022-01-01 RX ORDER — DEXTROSE 40 %
15 GEL (GRAM) ORAL AS NEEDED
Status: DISCONTINUED | OUTPATIENT
Start: 2022-01-01 | End: 2022-01-01

## 2022-01-01 RX ORDER — ENOXAPARIN SODIUM 100 MG/ML
40 INJECTION SUBCUTANEOUS DAILY
Status: DISCONTINUED | OUTPATIENT
Start: 2022-01-01 | End: 2022-01-01 | Stop reason: SDUPTHER

## 2022-01-01 RX ORDER — SODIUM BICARBONATE 84 MG/ML
50 INJECTION, SOLUTION INTRAVENOUS ONCE
Status: DISCONTINUED | OUTPATIENT
Start: 2022-01-01 | End: 2022-01-01

## 2022-01-01 RX ORDER — ASPIRIN 81 MG/1
81 TABLET ORAL DAILY
COMMUNITY

## 2022-01-01 RX ORDER — CALCIUM CHLORIDE INJECTION 100 MG/ML
INJECTION, SOLUTION INTRAVENOUS
Status: COMPLETED | OUTPATIENT
Start: 2022-01-01 | End: 2022-01-01

## 2022-01-01 RX ORDER — INSULIN LISPRO 100 [IU]/ML
INJECTION, SOLUTION INTRAVENOUS; SUBCUTANEOUS
Status: COMPLETED | OUTPATIENT
Start: 2022-01-01 | End: 2022-01-01

## 2022-01-01 RX ORDER — ONDANSETRON 2 MG/ML
4 INJECTION INTRAMUSCULAR; INTRAVENOUS
Status: DISCONTINUED | OUTPATIENT
Start: 2022-01-01 | End: 2022-01-01 | Stop reason: HOSPADM

## 2022-01-01 RX ORDER — SERTRALINE HYDROCHLORIDE 100 MG/1
100 TABLET, FILM COATED ORAL DAILY
COMMUNITY

## 2022-01-01 RX ORDER — MAG HYDROX/ALUMINUM HYD/SIMETH 200-200-20
15 SUSPENSION, ORAL (FINAL DOSE FORM) ORAL
Status: DISCONTINUED | OUTPATIENT
Start: 2022-01-01 | End: 2022-01-01 | Stop reason: HOSPADM

## 2022-01-01 RX ORDER — ASPIRIN 81 MG/1
81 TABLET ORAL DAILY
Status: DISCONTINUED | OUTPATIENT
Start: 2022-01-01 | End: 2022-01-01 | Stop reason: HOSPADM

## 2022-01-01 RX ORDER — INSULIN LISPRO 100 [IU]/ML
INJECTION, SOLUTION INTRAVENOUS; SUBCUTANEOUS
Status: DISCONTINUED | OUTPATIENT
Start: 2022-01-01 | End: 2022-01-01

## 2022-01-01 RX ORDER — SERTRALINE HYDROCHLORIDE 100 MG/1
100 TABLET, FILM COATED ORAL DAILY
Status: DISCONTINUED | OUTPATIENT
Start: 2022-01-01 | End: 2022-01-01 | Stop reason: HOSPADM

## 2022-01-01 RX ORDER — FLUTICASONE PROPIONATE AND SALMETEROL XINAFOATE 115; 21 UG/1; UG/1
2 AEROSOL, METERED RESPIRATORY (INHALATION) 2 TIMES DAILY
COMMUNITY

## 2022-01-01 RX ORDER — DOCUSATE SODIUM 100 MG/1
100 CAPSULE, LIQUID FILLED ORAL 2 TIMES DAILY
Status: DISCONTINUED | OUTPATIENT
Start: 2022-01-01 | End: 2022-01-01

## 2022-01-01 RX ORDER — BUDESONIDE AND FORMOTEROL FUMARATE DIHYDRATE 80; 4.5 UG/1; UG/1
2 AEROSOL RESPIRATORY (INHALATION)
Status: DISCONTINUED | OUTPATIENT
Start: 2022-01-01 | End: 2022-01-01 | Stop reason: HOSPADM

## 2022-01-01 RX ORDER — HYDROCODONE BITARTRATE AND ACETAMINOPHEN 5; 325 MG/1; MG/1
1 TABLET ORAL
Status: DISCONTINUED | OUTPATIENT
Start: 2022-01-01 | End: 2022-01-01 | Stop reason: HOSPADM

## 2022-01-01 RX ORDER — POTASSIUM CHLORIDE 14.9 MG/ML
20 INJECTION INTRAVENOUS
Status: DISCONTINUED | OUTPATIENT
Start: 2022-01-01 | End: 2022-01-01

## 2022-01-01 RX ORDER — AMITRIPTYLINE HYDROCHLORIDE 25 MG/1
25 TABLET, FILM COATED ORAL
Status: DISCONTINUED | OUTPATIENT
Start: 2022-01-01 | End: 2022-01-01 | Stop reason: HOSPADM

## 2022-01-01 RX ORDER — VANCOMYCIN 2 GRAM/500 ML IN 0.9 % SODIUM CHLORIDE INTRAVENOUS
2000 ONCE
Status: COMPLETED | OUTPATIENT
Start: 2022-01-01 | End: 2022-01-01

## 2022-01-01 RX ORDER — POLYETHYLENE GLYCOL 3350 17 G/17G
17 POWDER, FOR SOLUTION ORAL DAILY PRN
Status: DISCONTINUED | OUTPATIENT
Start: 2022-01-01 | End: 2022-01-01

## 2022-01-01 RX ORDER — ATORVASTATIN CALCIUM 20 MG/1
20 TABLET, FILM COATED ORAL DAILY
COMMUNITY

## 2022-01-01 RX ORDER — INSULIN GLARGINE 100 [IU]/ML
25 INJECTION, SOLUTION SUBCUTANEOUS DAILY
Status: DISCONTINUED | OUTPATIENT
Start: 2022-01-01 | End: 2022-01-01 | Stop reason: HOSPADM

## 2022-01-01 RX ORDER — VANCOMYCIN HYDROCHLORIDE
1250
Status: DISCONTINUED | OUTPATIENT
Start: 2022-01-01 | End: 2022-01-01

## 2022-01-01 RX ORDER — POTASSIUM CHLORIDE 14.9 MG/ML
20 INJECTION INTRAVENOUS ONCE
Status: COMPLETED | OUTPATIENT
Start: 2022-01-01 | End: 2022-01-01

## 2022-01-01 RX ORDER — NYSTATIN 100000 [USP'U]/G
POWDER TOPICAL 2 TIMES DAILY
Status: DISCONTINUED | OUTPATIENT
Start: 2022-01-01 | End: 2022-01-01 | Stop reason: HOSPADM

## 2022-01-01 RX ORDER — SODIUM CHLORIDE 0.9 % (FLUSH) 0.9 %
5-40 SYRINGE (ML) INJECTION EVERY 8 HOURS
Status: DISCONTINUED | OUTPATIENT
Start: 2022-01-01 | End: 2022-01-01 | Stop reason: HOSPADM

## 2022-01-01 RX ORDER — HEPARIN SODIUM 1000 [USP'U]/ML
80 INJECTION, SOLUTION INTRAVENOUS; SUBCUTANEOUS ONCE
Status: DISCONTINUED | OUTPATIENT
Start: 2022-01-01 | End: 2022-01-01 | Stop reason: HOSPADM

## 2022-01-01 RX ORDER — INSULIN LISPRO 100 [IU]/ML
INJECTION, SOLUTION INTRAVENOUS; SUBCUTANEOUS EVERY 6 HOURS
Status: DISCONTINUED | OUTPATIENT
Start: 2022-01-01 | End: 2022-01-01

## 2022-01-01 RX ORDER — INSULIN GLARGINE 100 [IU]/ML
8 INJECTION, SOLUTION SUBCUTANEOUS ONCE
Status: COMPLETED | OUTPATIENT
Start: 2022-01-01 | End: 2022-01-01

## 2022-01-01 RX ORDER — SODIUM CHLORIDE 0.9 % (FLUSH) 0.9 %
5-40 SYRINGE (ML) INJECTION AS NEEDED
Status: DISCONTINUED | OUTPATIENT
Start: 2022-01-01 | End: 2022-01-01 | Stop reason: HOSPADM

## 2022-01-01 RX ORDER — ALBUTEROL SULFATE 0.83 MG/ML
2.5 SOLUTION RESPIRATORY (INHALATION)
Status: DISCONTINUED | OUTPATIENT
Start: 2022-01-01 | End: 2022-01-01 | Stop reason: HOSPADM

## 2022-01-01 RX ORDER — SODIUM BICARBONATE 1 MEQ/ML
SYRINGE (ML) INTRAVENOUS
Status: COMPLETED | OUTPATIENT
Start: 2022-01-01 | End: 2022-01-01

## 2022-01-01 RX ORDER — SODIUM CHLORIDE 9 MG/ML
50 INJECTION, SOLUTION INTRAVENOUS CONTINUOUS
Status: DISPENSED | OUTPATIENT
Start: 2022-01-01 | End: 2022-01-01

## 2022-01-01 RX ORDER — FLUCONAZOLE 2 MG/ML
200 INJECTION, SOLUTION INTRAVENOUS EVERY 24 HOURS
Status: DISCONTINUED | OUTPATIENT
Start: 2022-01-01 | End: 2022-01-01

## 2022-01-01 RX ORDER — SODIUM BICARBONATE 1 MEQ/ML
100 SYRINGE (ML) INTRAVENOUS ONCE
Status: COMPLETED | OUTPATIENT
Start: 2022-01-01 | End: 2022-01-01

## 2022-01-01 RX ORDER — POTASSIUM CHLORIDE 14.9 MG/ML
20 INJECTION INTRAVENOUS
Status: DISPENSED | OUTPATIENT
Start: 2022-01-01 | End: 2022-01-01

## 2022-01-01 RX ORDER — INSULIN GLARGINE 100 [IU]/ML
20 INJECTION, SOLUTION SUBCUTANEOUS DAILY
Status: DISCONTINUED | OUTPATIENT
Start: 2022-01-01 | End: 2022-01-01

## 2022-01-01 RX ORDER — HEPARIN SODIUM 5000 [USP'U]/100ML
18-36 INJECTION, SOLUTION INTRAVENOUS
Status: DISCONTINUED | OUTPATIENT
Start: 2022-01-01 | End: 2022-01-01 | Stop reason: HOSPADM

## 2022-01-01 RX ORDER — ACETAMINOPHEN 325 MG/1
650 TABLET ORAL
Status: DISCONTINUED | OUTPATIENT
Start: 2022-01-01 | End: 2022-01-01 | Stop reason: HOSPADM

## 2022-01-01 RX ORDER — DOCUSATE SODIUM 50 MG/5ML
100 LIQUID ORAL DAILY
Status: DISCONTINUED | OUTPATIENT
Start: 2022-01-01 | End: 2022-01-01 | Stop reason: HOSPADM

## 2022-01-01 RX ORDER — INSULIN LISPRO 100 [IU]/ML
0-10 INJECTION, SOLUTION INTRAVENOUS; SUBCUTANEOUS
Status: DISCONTINUED | OUTPATIENT
Start: 2022-01-01 | End: 2022-01-01

## 2022-01-01 RX ORDER — DEXAMETHASONE 4 MG/1
6 TABLET ORAL DAILY
Status: DISCONTINUED | OUTPATIENT
Start: 2022-01-01 | End: 2022-01-01 | Stop reason: HOSPADM

## 2022-01-01 RX ADMIN — CEFTRIAXONE 1 G: 1 INJECTION, POWDER, FOR SOLUTION INTRAMUSCULAR; INTRAVENOUS at 16:24

## 2022-01-01 RX ADMIN — ENOXAPARIN SODIUM 40 MG: 100 INJECTION SUBCUTANEOUS at 09:03

## 2022-01-01 RX ADMIN — PIPERACILLIN SODIUM AND TAZOBACTAM SODIUM 3.38 G: 3; .375 INJECTION, POWDER, LYOPHILIZED, FOR SOLUTION INTRAVENOUS at 08:33

## 2022-01-01 RX ADMIN — ENOXAPARIN SODIUM 40 MG: 100 INJECTION SUBCUTANEOUS at 08:33

## 2022-01-01 RX ADMIN — NYSTATIN: 100000 POWDER TOPICAL at 10:19

## 2022-01-01 RX ADMIN — SODIUM CHLORIDE, PRESERVATIVE FREE 10 ML: 5 INJECTION INTRAVENOUS at 07:49

## 2022-01-01 RX ADMIN — SODIUM CHLORIDE, PRESERVATIVE FREE 10 ML: 5 INJECTION INTRAVENOUS at 21:26

## 2022-01-01 RX ADMIN — POTASSIUM CHLORIDE 20 MEQ: 200 INJECTION, SOLUTION INTRAVENOUS at 12:01

## 2022-01-01 RX ADMIN — BUDESONIDE AND FORMOTEROL FUMARATE DIHYDRATE 2 PUFF: 80; 4.5 AEROSOL RESPIRATORY (INHALATION) at 20:46

## 2022-01-01 RX ADMIN — PIPERACILLIN SODIUM AND TAZOBACTAM SODIUM 3.38 G: 3; .375 INJECTION, POWDER, LYOPHILIZED, FOR SOLUTION INTRAVENOUS at 16:37

## 2022-01-01 RX ADMIN — INSULIN LISPRO 6 UNITS: 100 INJECTION, SOLUTION INTRAVENOUS; SUBCUTANEOUS at 08:20

## 2022-01-01 RX ADMIN — SODIUM CHLORIDE 50 ML/HR: 900 INJECTION, SOLUTION INTRAVENOUS at 15:03

## 2022-01-01 RX ADMIN — INSULIN LISPRO 4 UNITS: 100 INJECTION, SOLUTION INTRAVENOUS; SUBCUTANEOUS at 12:33

## 2022-01-01 RX ADMIN — MAGNESIUM SULFATE HEPTAHYDRATE 2 G: 40 INJECTION, SOLUTION INTRAVENOUS at 02:04

## 2022-01-01 RX ADMIN — POTASSIUM CHLORIDE 20 MEQ: 200 INJECTION, SOLUTION INTRAVENOUS at 21:43

## 2022-01-01 RX ADMIN — CEFTRIAXONE 1 G: 1 INJECTION, POWDER, FOR SOLUTION INTRAMUSCULAR; INTRAVENOUS at 17:46

## 2022-01-01 RX ADMIN — Medication 1 AMPULE: at 08:21

## 2022-01-01 RX ADMIN — INSULIN GLARGINE 20 UNITS: 100 INJECTION, SOLUTION SUBCUTANEOUS at 09:02

## 2022-01-01 RX ADMIN — INSULIN LISPRO 2 UNITS: 100 INJECTION, SOLUTION INTRAVENOUS; SUBCUTANEOUS at 18:15

## 2022-01-01 RX ADMIN — DOCUSATE SODIUM 100 MG: 100 CAPSULE ORAL at 17:42

## 2022-01-01 RX ADMIN — ATORVASTATIN CALCIUM 20 MG: 10 TABLET, FILM COATED ORAL at 08:28

## 2022-01-01 RX ADMIN — INSULIN LISPRO 4 UNITS: 100 INJECTION, SOLUTION INTRAVENOUS; SUBCUTANEOUS at 08:28

## 2022-01-01 RX ADMIN — EPINEPHRINE 1 MG: 0.1 INJECTION, SOLUTION ENDOTRACHEAL; INTRACARDIAC; INTRAVENOUS at 09:09

## 2022-01-01 RX ADMIN — SODIUM CHLORIDE 40 MG: 9 INJECTION INTRAMUSCULAR; INTRAVENOUS; SUBCUTANEOUS at 22:14

## 2022-01-01 RX ADMIN — NYSTATIN: 100000 POWDER TOPICAL at 09:47

## 2022-01-01 RX ADMIN — INSULIN LISPRO 4 UNITS: 100 INJECTION, SOLUTION INTRAVENOUS; SUBCUTANEOUS at 22:08

## 2022-01-01 RX ADMIN — ENOXAPARIN SODIUM 40 MG: 100 INJECTION SUBCUTANEOUS at 08:19

## 2022-01-01 RX ADMIN — SODIUM CHLORIDE, PRESERVATIVE FREE 10 ML: 5 INJECTION INTRAVENOUS at 14:47

## 2022-01-01 RX ADMIN — NYSTATIN: 100000 POWDER TOPICAL at 17:06

## 2022-01-01 RX ADMIN — BUDESONIDE AND FORMOTEROL FUMARATE DIHYDRATE 2 PUFF: 80; 4.5 AEROSOL RESPIRATORY (INHALATION) at 09:02

## 2022-01-01 RX ADMIN — SERTRALINE 100 MG: 100 TABLET, FILM COATED ORAL at 09:45

## 2022-01-01 RX ADMIN — POTASSIUM CHLORIDE 20 MEQ: 14.9 INJECTION, SOLUTION INTRAVENOUS at 17:11

## 2022-01-01 RX ADMIN — ENOXAPARIN SODIUM 40 MG: 100 INJECTION SUBCUTANEOUS at 09:25

## 2022-01-01 RX ADMIN — ALTEPLASE: KIT at 09:13

## 2022-01-01 RX ADMIN — INSULIN LISPRO 6 UNITS: 100 INJECTION, SOLUTION INTRAVENOUS; SUBCUTANEOUS at 16:07

## 2022-01-01 RX ADMIN — INSULIN LISPRO 3 UNITS: 100 INJECTION, SOLUTION INTRAVENOUS; SUBCUTANEOUS at 01:30

## 2022-01-01 RX ADMIN — NYSTATIN: 100000 POWDER TOPICAL at 18:04

## 2022-01-01 RX ADMIN — Medication 1 AMPULE: at 21:25

## 2022-01-01 RX ADMIN — EPINEPHRINE 1 MG: 0.1 INJECTION, SOLUTION ENDOTRACHEAL; INTRACARDIAC; INTRAVENOUS at 09:05

## 2022-01-01 RX ADMIN — INSULIN LISPRO 3 UNITS: 100 INJECTION, SOLUTION INTRAVENOUS; SUBCUTANEOUS at 14:55

## 2022-01-01 RX ADMIN — SODIUM CHLORIDE, PRESERVATIVE FREE 10 ML: 5 INJECTION INTRAVENOUS at 13:30

## 2022-01-01 RX ADMIN — INSULIN LISPRO 6 UNITS: 100 INJECTION, SOLUTION INTRAVENOUS; SUBCUTANEOUS at 17:04

## 2022-01-01 RX ADMIN — THIAMINE HYDROCHLORIDE: 100 INJECTION, SOLUTION INTRAMUSCULAR; INTRAVENOUS at 17:41

## 2022-01-01 RX ADMIN — ACETAMINOPHEN 650 MG: 650 SUPPOSITORY RECTAL at 08:29

## 2022-01-01 RX ADMIN — SODIUM CHLORIDE, PRESERVATIVE FREE 10 ML: 5 INJECTION INTRAVENOUS at 05:26

## 2022-01-01 RX ADMIN — ASPIRIN 81 MG: 81 TABLET ORAL at 12:36

## 2022-01-01 RX ADMIN — POLYETHYLENE GLYCOL 3350 17 G: 17 POWDER, FOR SOLUTION ORAL at 08:19

## 2022-01-01 RX ADMIN — SODIUM CHLORIDE, PRESERVATIVE FREE 10 ML: 5 INJECTION INTRAVENOUS at 22:16

## 2022-01-01 RX ADMIN — SODIUM CHLORIDE, PRESERVATIVE FREE 10 ML: 5 INJECTION INTRAVENOUS at 21:05

## 2022-01-01 RX ADMIN — NYSTATIN: 100000 POWDER TOPICAL at 17:01

## 2022-01-01 RX ADMIN — SODIUM BICARBONATE: 84 INJECTION, SOLUTION INTRAVENOUS at 09:12

## 2022-01-01 RX ADMIN — NYSTATIN: 100000 POWDER TOPICAL at 08:34

## 2022-01-01 RX ADMIN — NYSTATIN: 100000 POWDER TOPICAL at 09:12

## 2022-01-01 RX ADMIN — VANCOMYCIN HYDROCHLORIDE 1250 MG: 500 INJECTION, POWDER, LYOPHILIZED, FOR SOLUTION INTRAVENOUS at 04:32

## 2022-01-01 RX ADMIN — ATORVASTATIN CALCIUM 20 MG: 10 TABLET, FILM COATED ORAL at 08:19

## 2022-01-01 RX ADMIN — SODIUM CHLORIDE 40 MG: 9 INJECTION INTRAMUSCULAR; INTRAVENOUS; SUBCUTANEOUS at 17:46

## 2022-01-01 RX ADMIN — ATORVASTATIN CALCIUM 20 MG: 10 TABLET, FILM COATED ORAL at 09:45

## 2022-01-01 RX ADMIN — SODIUM CHLORIDE 40 MG: 9 INJECTION INTRAMUSCULAR; INTRAVENOUS; SUBCUTANEOUS at 18:03

## 2022-01-01 RX ADMIN — HYDROCODONE BITARTRATE AND ACETAMINOPHEN 1 TABLET: 5; 325 TABLET ORAL at 12:57

## 2022-01-01 RX ADMIN — SODIUM CHLORIDE 40 MG: 9 INJECTION INTRAMUSCULAR; INTRAVENOUS; SUBCUTANEOUS at 09:05

## 2022-01-01 RX ADMIN — MORPHINE SULFATE 2 MG: 2 INJECTION, SOLUTION INTRAMUSCULAR; INTRAVENOUS at 21:36

## 2022-01-01 RX ADMIN — INSULIN GLARGINE 8 UNITS: 100 INJECTION, SOLUTION SUBCUTANEOUS at 08:42

## 2022-01-01 RX ADMIN — SODIUM CHLORIDE 40 MG: 9 INJECTION INTRAMUSCULAR; INTRAVENOUS; SUBCUTANEOUS at 08:19

## 2022-01-01 RX ADMIN — Medication 1 AMPULE: at 20:47

## 2022-01-01 RX ADMIN — AZITHROMYCIN MONOHYDRATE 500 MG: 500 INJECTION, POWDER, LYOPHILIZED, FOR SOLUTION INTRAVENOUS at 04:40

## 2022-01-01 RX ADMIN — SODIUM CHLORIDE, PRESERVATIVE FREE 10 ML: 5 INJECTION INTRAVENOUS at 05:58

## 2022-01-01 RX ADMIN — INSULIN LISPRO 4 UNITS: 100 INJECTION, SOLUTION INTRAVENOUS; SUBCUTANEOUS at 12:54

## 2022-01-01 RX ADMIN — SODIUM CHLORIDE, PRESERVATIVE FREE 10 ML: 5 INJECTION INTRAVENOUS at 05:03

## 2022-01-01 RX ADMIN — POTASSIUM CHLORIDE 20 MEQ: 200 INJECTION, SOLUTION INTRAVENOUS at 19:41

## 2022-01-01 RX ADMIN — Medication 1 AMPULE: at 21:00

## 2022-01-01 RX ADMIN — NYSTATIN: 100000 POWDER TOPICAL at 17:11

## 2022-01-01 RX ADMIN — INSULIN HUMAN 6 UNITS: 100 INJECTION, SOLUTION PARENTERAL at 00:32

## 2022-01-01 RX ADMIN — INSULIN LISPRO 3 UNITS: 100 INJECTION, SOLUTION INTRAVENOUS; SUBCUTANEOUS at 17:27

## 2022-01-01 RX ADMIN — NYSTATIN: 100000 POWDER TOPICAL at 09:00

## 2022-01-01 RX ADMIN — INSULIN LISPRO 3 UNITS: 100 INJECTION, SOLUTION INTRAVENOUS; SUBCUTANEOUS at 04:16

## 2022-01-01 RX ADMIN — SODIUM BICARBONATE: 84 INJECTION, SOLUTION INTRAVENOUS at 07:30

## 2022-01-01 RX ADMIN — INSULIN LISPRO 2 UNITS: 100 INJECTION, SOLUTION INTRAVENOUS; SUBCUTANEOUS at 06:45

## 2022-01-01 RX ADMIN — INSULIN LISPRO 3 UNITS: 100 INJECTION, SOLUTION INTRAVENOUS; SUBCUTANEOUS at 13:23

## 2022-01-01 RX ADMIN — DEXAMETHASONE 6 MG: 4 TABLET ORAL at 13:31

## 2022-01-01 RX ADMIN — PIPERACILLIN SODIUM AND TAZOBACTAM SODIUM 3.38 G: 3; .375 INJECTION, POWDER, LYOPHILIZED, FOR SOLUTION INTRAVENOUS at 09:04

## 2022-01-01 RX ADMIN — AZITHROMYCIN MONOHYDRATE 500 MG: 500 INJECTION, POWDER, LYOPHILIZED, FOR SOLUTION INTRAVENOUS at 04:33

## 2022-01-01 RX ADMIN — DEXTROSE MONOHYDRATE 151 ML/HR: 5 INJECTION, SOLUTION INTRAVENOUS at 14:45

## 2022-01-01 RX ADMIN — Medication 1 AMPULE: at 09:45

## 2022-01-01 RX ADMIN — INSULIN LISPRO 3 UNITS: 100 INJECTION, SOLUTION INTRAVENOUS; SUBCUTANEOUS at 12:19

## 2022-01-01 RX ADMIN — SODIUM CHLORIDE, PRESERVATIVE FREE 10 ML: 5 INJECTION INTRAVENOUS at 13:43

## 2022-01-01 RX ADMIN — POTASSIUM CHLORIDE 20 MEQ: 14.9 INJECTION, SOLUTION INTRAVENOUS at 15:30

## 2022-01-01 RX ADMIN — EPINEPHRINE 1 MG: 0.1 INJECTION, SOLUTION ENDOTRACHEAL; INTRACARDIAC; INTRAVENOUS at 08:57

## 2022-01-01 RX ADMIN — TUBERCULIN PURIFIED PROTEIN DERIVATIVE 5 UNITS: 5 INJECTION, SOLUTION INTRADERMAL at 14:41

## 2022-01-01 RX ADMIN — INSULIN LISPRO 6 UNITS: 100 INJECTION, SOLUTION INTRAVENOUS; SUBCUTANEOUS at 11:50

## 2022-01-01 RX ADMIN — ASPIRIN 81 MG: 81 TABLET ORAL at 08:19

## 2022-01-01 RX ADMIN — INSULIN HUMAN 12 UNITS: 100 INJECTION, SOLUTION PARENTERAL at 05:52

## 2022-01-01 RX ADMIN — HYDROCODONE BITARTRATE AND ACETAMINOPHEN 1 TABLET: 5; 325 TABLET ORAL at 22:58

## 2022-01-01 RX ADMIN — ASPIRIN 81 MG: 81 TABLET ORAL at 08:28

## 2022-01-01 RX ADMIN — EPINEPHRINE 1 MG: 0.1 INJECTION, SOLUTION ENDOTRACHEAL; INTRACARDIAC; INTRAVENOUS at 09:03

## 2022-01-01 RX ADMIN — ENOXAPARIN SODIUM 40 MG: 100 INJECTION SUBCUTANEOUS at 08:28

## 2022-01-01 RX ADMIN — SODIUM BICARBONATE 50 MEQ: 84 INJECTION, SOLUTION INTRAVENOUS at 16:37

## 2022-01-01 RX ADMIN — DEXAMETHASONE SODIUM PHOSPHATE 6 MG: 10 INJECTION INTRAMUSCULAR; INTRAVENOUS at 13:42

## 2022-01-01 RX ADMIN — SODIUM BICARBONATE: 84 INJECTION, SOLUTION INTRAVENOUS at 15:07

## 2022-01-01 RX ADMIN — INSULIN LISPRO 3 UNITS: 100 INJECTION, SOLUTION INTRAVENOUS; SUBCUTANEOUS at 00:21

## 2022-01-01 RX ADMIN — SODIUM CHLORIDE 40 MG: 9 INJECTION INTRAMUSCULAR; INTRAVENOUS; SUBCUTANEOUS at 09:46

## 2022-01-01 RX ADMIN — ENOXAPARIN SODIUM 40 MG: 100 INJECTION SUBCUTANEOUS at 12:35

## 2022-01-01 RX ADMIN — BUDESONIDE AND FORMOTEROL FUMARATE DIHYDRATE 2 PUFF: 80; 4.5 AEROSOL RESPIRATORY (INHALATION) at 08:35

## 2022-01-01 RX ADMIN — DEXAMETHASONE SODIUM PHOSPHATE 6 MG: 10 INJECTION INTRAMUSCULAR; INTRAVENOUS at 13:00

## 2022-01-01 RX ADMIN — CEFTRIAXONE 1 G: 1 INJECTION, POWDER, FOR SOLUTION INTRAMUSCULAR; INTRAVENOUS at 17:01

## 2022-01-01 RX ADMIN — POTASSIUM CHLORIDE 20 MEQ: 200 INJECTION, SOLUTION INTRAVENOUS at 01:41

## 2022-01-01 RX ADMIN — BUDESONIDE AND FORMOTEROL FUMARATE DIHYDRATE 2 PUFF: 80; 4.5 AEROSOL RESPIRATORY (INHALATION) at 20:56

## 2022-01-01 RX ADMIN — NYSTATIN: 100000 POWDER TOPICAL at 18:00

## 2022-01-01 RX ADMIN — SODIUM BICARBONATE 100 MEQ: 84 INJECTION INTRAVENOUS at 08:59

## 2022-01-01 RX ADMIN — INSULIN GLARGINE 20 UNITS: 100 INJECTION, SOLUTION SUBCUTANEOUS at 10:12

## 2022-01-01 RX ADMIN — NYSTATIN: 100000 POWDER TOPICAL at 17:50

## 2022-01-01 RX ADMIN — SODIUM CHLORIDE 40 MG: 9 INJECTION INTRAMUSCULAR; INTRAVENOUS; SUBCUTANEOUS at 17:11

## 2022-01-01 RX ADMIN — MAGNESIUM SULFATE HEPTAHYDRATE 2 G: 40 INJECTION, SOLUTION INTRAVENOUS at 16:22

## 2022-01-01 RX ADMIN — SODIUM CHLORIDE 40 MG: 9 INJECTION INTRAMUSCULAR; INTRAVENOUS; SUBCUTANEOUS at 10:19

## 2022-01-01 RX ADMIN — INSULIN LISPRO 2 UNITS: 100 INJECTION, SOLUTION INTRAVENOUS; SUBCUTANEOUS at 16:23

## 2022-01-01 RX ADMIN — IPRATROPIUM BROMIDE AND ALBUTEROL SULFATE 3 ML: .5; 3 SOLUTION RESPIRATORY (INHALATION) at 22:56

## 2022-01-01 RX ADMIN — INSULIN LISPRO 6 UNITS: 100 INJECTION, SOLUTION INTRAVENOUS; SUBCUTANEOUS at 12:08

## 2022-01-01 RX ADMIN — SODIUM CHLORIDE, PRESERVATIVE FREE 10 ML: 5 INJECTION INTRAVENOUS at 22:00

## 2022-01-01 RX ADMIN — POTASSIUM CHLORIDE 20 MEQ: 200 INJECTION, SOLUTION INTRAVENOUS at 10:10

## 2022-01-01 RX ADMIN — CEFTRIAXONE 1 G: 1 INJECTION, POWDER, FOR SOLUTION INTRAMUSCULAR; INTRAVENOUS at 03:50

## 2022-01-01 RX ADMIN — INSULIN LISPRO 4 UNITS: 100 INJECTION, SOLUTION INTRAVENOUS; SUBCUTANEOUS at 13:41

## 2022-01-01 RX ADMIN — NYSTATIN: 100000 POWDER TOPICAL at 09:03

## 2022-01-01 RX ADMIN — PIPERACILLIN SODIUM AND TAZOBACTAM SODIUM 3.38 G: 3; .375 INJECTION, POWDER, LYOPHILIZED, FOR SOLUTION INTRAVENOUS at 01:59

## 2022-01-01 RX ADMIN — SODIUM CHLORIDE, PRESERVATIVE FREE 10 ML: 5 INJECTION INTRAVENOUS at 21:33

## 2022-01-01 RX ADMIN — SODIUM BICARBONATE 50 MEQ: 84 INJECTION, SOLUTION INTRAVENOUS at 08:58

## 2022-01-01 RX ADMIN — POTASSIUM CHLORIDE 20 MEQ: 200 INJECTION, SOLUTION INTRAVENOUS at 15:19

## 2022-01-01 RX ADMIN — INSULIN LISPRO 3 UNITS: 100 INJECTION, SOLUTION INTRAVENOUS; SUBCUTANEOUS at 09:02

## 2022-01-01 RX ADMIN — Medication 1 AMPULE: at 21:05

## 2022-01-01 RX ADMIN — EPINEPHRINE: 0.1 INJECTION, SOLUTION ENDOTRACHEAL; INTRACARDIAC; INTRAVENOUS at 08:54

## 2022-01-01 RX ADMIN — SODIUM CHLORIDE 40 MG: 9 INJECTION INTRAMUSCULAR; INTRAVENOUS; SUBCUTANEOUS at 17:01

## 2022-01-01 RX ADMIN — SODIUM CHLORIDE 1000 ML: 9 INJECTION, SOLUTION INTRAVENOUS at 03:35

## 2022-01-01 RX ADMIN — ENOXAPARIN SODIUM 40 MG: 100 INJECTION SUBCUTANEOUS at 10:19

## 2022-01-01 RX ADMIN — SODIUM BICARBONATE: 84 INJECTION, SOLUTION INTRAVENOUS at 13:37

## 2022-01-01 RX ADMIN — FLUCONAZOLE IN SODIUM CHLORIDE 200 MG: 2 INJECTION, SOLUTION INTRAVENOUS at 12:01

## 2022-01-01 RX ADMIN — NYSTATIN: 100000 POWDER TOPICAL at 21:18

## 2022-01-01 RX ADMIN — SERTRALINE 100 MG: 100 TABLET, FILM COATED ORAL at 08:28

## 2022-01-01 RX ADMIN — ACETAMINOPHEN 650 MG: 325 TABLET ORAL at 15:52

## 2022-01-01 RX ADMIN — SODIUM BICARBONATE 50 MEQ: 84 INJECTION, SOLUTION INTRAVENOUS at 09:06

## 2022-01-01 RX ADMIN — METOPROLOL TARTRATE 5 MG: 5 INJECTION INTRAVENOUS at 15:51

## 2022-01-01 RX ADMIN — SODIUM CHLORIDE, PRESERVATIVE FREE 10 ML: 5 INJECTION INTRAVENOUS at 05:22

## 2022-01-01 RX ADMIN — Medication 1 AMPULE: at 10:05

## 2022-01-01 RX ADMIN — SODIUM BICARBONATE: 84 INJECTION, SOLUTION INTRAVENOUS at 14:23

## 2022-01-01 RX ADMIN — ASPIRIN 81 MG: 81 TABLET ORAL at 09:45

## 2022-01-01 RX ADMIN — CEFTRIAXONE 1 G: 1 INJECTION, POWDER, FOR SOLUTION INTRAMUSCULAR; INTRAVENOUS at 15:49

## 2022-01-01 RX ADMIN — SODIUM CHLORIDE, PRESERVATIVE FREE 10 ML: 5 INJECTION INTRAVENOUS at 13:33

## 2022-01-01 RX ADMIN — INSULIN LISPRO 3 UNITS: 100 INJECTION, SOLUTION INTRAVENOUS; SUBCUTANEOUS at 21:25

## 2022-01-01 RX ADMIN — PIPERACILLIN SODIUM AND TAZOBACTAM SODIUM 3.38 G: 3; .375 INJECTION, POWDER, LYOPHILIZED, FOR SOLUTION INTRAVENOUS at 16:48

## 2022-01-01 RX ADMIN — SODIUM CHLORIDE, PRESERVATIVE FREE 10 ML: 5 INJECTION INTRAVENOUS at 21:02

## 2022-01-01 RX ADMIN — SERTRALINE 100 MG: 100 TABLET, FILM COATED ORAL at 08:20

## 2022-01-01 RX ADMIN — Medication 1 AMPULE: at 22:13

## 2022-01-01 RX ADMIN — SODIUM CHLORIDE 40 MG: 9 INJECTION INTRAMUSCULAR; INTRAVENOUS; SUBCUTANEOUS at 15:49

## 2022-01-01 RX ADMIN — INSULIN HUMAN 9 UNITS: 100 INJECTION, SOLUTION PARENTERAL at 17:37

## 2022-01-01 RX ADMIN — POTASSIUM CHLORIDE 20 MEQ: 200 INJECTION, SOLUTION INTRAVENOUS at 18:04

## 2022-01-01 RX ADMIN — POTASSIUM CHLORIDE 20 MEQ: 200 INJECTION, SOLUTION INTRAVENOUS at 21:33

## 2022-01-01 RX ADMIN — SODIUM BICARBONATE 50 MEQ: 84 INJECTION, SOLUTION INTRAVENOUS at 09:18

## 2022-01-01 RX ADMIN — SODIUM CHLORIDE, PRESERVATIVE FREE 10 ML: 5 INJECTION INTRAVENOUS at 05:24

## 2022-01-01 RX ADMIN — POTASSIUM CHLORIDE 20 MEQ: 200 INJECTION, SOLUTION INTRAVENOUS at 04:04

## 2022-01-01 RX ADMIN — SODIUM CHLORIDE, PRESERVATIVE FREE 10 ML: 5 INJECTION INTRAVENOUS at 21:41

## 2022-01-01 RX ADMIN — AZITHROMYCIN MONOHYDRATE 500 MG: 500 INJECTION, POWDER, LYOPHILIZED, FOR SOLUTION INTRAVENOUS at 04:18

## 2022-01-01 RX ADMIN — SERTRALINE 100 MG: 100 TABLET, FILM COATED ORAL at 12:36

## 2022-01-01 RX ADMIN — ENOXAPARIN SODIUM 40 MG: 100 INJECTION SUBCUTANEOUS at 09:46

## 2022-01-01 RX ADMIN — SODIUM CHLORIDE 40 MG: 9 INJECTION INTRAMUSCULAR; INTRAVENOUS; SUBCUTANEOUS at 15:53

## 2022-01-01 RX ADMIN — VANCOMYCIN HYDROCHLORIDE 2000 MG: 500 INJECTION, POWDER, LYOPHILIZED, FOR SOLUTION INTRAVENOUS at 10:08

## 2022-01-01 RX ADMIN — INSULIN GLARGINE 20 UNITS: 100 INJECTION, SOLUTION SUBCUTANEOUS at 10:03

## 2022-01-01 RX ADMIN — BUDESONIDE AND FORMOTEROL FUMARATE DIHYDRATE 2 PUFF: 80; 4.5 AEROSOL RESPIRATORY (INHALATION) at 20:20

## 2022-01-01 RX ADMIN — CEFTRIAXONE 1 G: 1 INJECTION, POWDER, FOR SOLUTION INTRAMUSCULAR; INTRAVENOUS at 17:03

## 2022-01-01 RX ADMIN — AMITRIPTYLINE HYDROCHLORIDE 25 MG: 50 TABLET, FILM COATED ORAL at 23:00

## 2022-01-01 RX ADMIN — INSULIN LISPRO 2 UNITS: 100 INJECTION, SOLUTION INTRAVENOUS; SUBCUTANEOUS at 11:35

## 2022-01-01 RX ADMIN — SODIUM CHLORIDE, PRESERVATIVE FREE 10 ML: 5 INJECTION INTRAVENOUS at 06:20

## 2022-01-01 RX ADMIN — NYSTATIN: 100000 POWDER TOPICAL at 08:19

## 2022-01-01 RX ADMIN — BUDESONIDE AND FORMOTEROL FUMARATE DIHYDRATE 2 PUFF: 80; 4.5 AEROSOL RESPIRATORY (INHALATION) at 20:05

## 2022-01-01 RX ADMIN — CEFTRIAXONE 1 G: 1 INJECTION, POWDER, FOR SOLUTION INTRAMUSCULAR; INTRAVENOUS at 02:41

## 2022-01-01 RX ADMIN — Medication 1 AMPULE: at 08:28

## 2022-01-01 RX ADMIN — AZITHROMYCIN MONOHYDRATE 500 MG: 500 INJECTION, POWDER, LYOPHILIZED, FOR SOLUTION INTRAVENOUS at 03:32

## 2022-01-01 RX ADMIN — SODIUM CHLORIDE 40 MG: 9 INJECTION INTRAMUSCULAR; INTRAVENOUS; SUBCUTANEOUS at 10:04

## 2022-01-01 RX ADMIN — SODIUM CHLORIDE, PRESERVATIVE FREE 10 ML: 5 INJECTION INTRAVENOUS at 13:04

## 2022-01-01 RX ADMIN — SODIUM CHLORIDE, PRESERVATIVE FREE 10 ML: 5 INJECTION INTRAVENOUS at 05:35

## 2022-01-01 RX ADMIN — EPINEPHRINE 1 MG: 0.1 INJECTION, SOLUTION ENDOTRACHEAL; INTRACARDIAC; INTRAVENOUS at 09:18

## 2022-01-01 RX ADMIN — INSULIN LISPRO 2 UNITS: 100 INJECTION, SOLUTION INTRAVENOUS; SUBCUTANEOUS at 14:44

## 2022-01-01 RX ADMIN — PIPERACILLIN SODIUM AND TAZOBACTAM SODIUM 3.38 G: 3; .375 INJECTION, POWDER, LYOPHILIZED, FOR SOLUTION INTRAVENOUS at 01:01

## 2022-01-01 RX ADMIN — POTASSIUM CHLORIDE 20 MEQ: 200 INJECTION, SOLUTION INTRAVENOUS at 05:19

## 2022-01-01 RX ADMIN — SODIUM CHLORIDE, PRESERVATIVE FREE 10 ML: 5 INJECTION INTRAVENOUS at 13:36

## 2022-01-01 RX ADMIN — POTASSIUM CHLORIDE 20 MEQ: 14.9 INJECTION, SOLUTION INTRAVENOUS at 02:23

## 2022-01-01 RX ADMIN — WATER 1 MG: 1 INJECTION INTRAMUSCULAR; INTRAVENOUS; SUBCUTANEOUS at 13:31

## 2022-01-01 RX ADMIN — SODIUM CHLORIDE, PRESERVATIVE FREE 10 ML: 5 INJECTION INTRAVENOUS at 16:08

## 2022-01-01 RX ADMIN — BUDESONIDE AND FORMOTEROL FUMARATE DIHYDRATE 2 PUFF: 80; 4.5 AEROSOL RESPIRATORY (INHALATION) at 08:32

## 2022-01-01 RX ADMIN — INSULIN LISPRO 3 UNITS: 100 INJECTION, SOLUTION INTRAVENOUS; SUBCUTANEOUS at 22:13

## 2022-01-01 RX ADMIN — Medication 1 AMPULE: at 21:18

## 2022-01-01 RX ADMIN — ATORVASTATIN CALCIUM 20 MG: 10 TABLET, FILM COATED ORAL at 12:36

## 2022-01-01 RX ADMIN — SODIUM CHLORIDE, PRESERVATIVE FREE 10 ML: 5 INJECTION INTRAVENOUS at 15:04

## 2022-01-01 RX ADMIN — SODIUM CHLORIDE, PRESERVATIVE FREE 10 ML: 5 INJECTION INTRAVENOUS at 06:16

## 2022-01-01 RX ADMIN — NYSTATIN: 100000 POWDER TOPICAL at 18:47

## 2022-01-01 RX ADMIN — SODIUM CHLORIDE 50 ML/HR: 900 INJECTION, SOLUTION INTRAVENOUS at 10:22

## 2022-01-01 RX ADMIN — DEXTROSE MONOHYDRATE 50 ML/HR: 5 INJECTION, SOLUTION INTRAVENOUS at 21:43

## 2022-01-01 RX ADMIN — SODIUM CHLORIDE 40 MG: 9 INJECTION INTRAMUSCULAR; INTRAVENOUS; SUBCUTANEOUS at 08:28

## 2022-01-01 RX ADMIN — INSULIN LISPRO 3 UNITS: 100 INJECTION, SOLUTION INTRAVENOUS; SUBCUTANEOUS at 10:14

## 2022-01-01 RX ADMIN — POTASSIUM CHLORIDE 20 MEQ: 14.9 INJECTION, SOLUTION INTRAVENOUS at 14:26

## 2022-01-01 RX ADMIN — SODIUM BICARBONATE: 84 INJECTION, SOLUTION INTRAVENOUS at 00:14

## 2022-01-01 RX ADMIN — POTASSIUM CHLORIDE 20 MEQ: 14.9 INJECTION, SOLUTION INTRAVENOUS at 06:39

## 2022-01-01 RX ADMIN — INSULIN LISPRO 3 UNITS: 100 INJECTION, SOLUTION INTRAVENOUS; SUBCUTANEOUS at 01:03

## 2022-01-01 RX ADMIN — DEXTROSE MONOHYDRATE 151 ML/HR: 5 INJECTION, SOLUTION INTRAVENOUS at 08:50

## 2022-01-01 RX ADMIN — THIAMINE HYDROCHLORIDE: 100 INJECTION, SOLUTION INTRAMUSCULAR; INTRAVENOUS at 17:58

## 2022-01-01 RX ADMIN — INSULIN GLARGINE 20 UNITS: 100 INJECTION, SOLUTION SUBCUTANEOUS at 08:21

## 2022-01-01 RX ADMIN — PIPERACILLIN SODIUM AND TAZOBACTAM SODIUM 3.38 G: 3; .375 INJECTION, POWDER, LYOPHILIZED, FOR SOLUTION INTRAVENOUS at 10:08

## 2022-01-01 RX ADMIN — SODIUM CHLORIDE 4.7 UNITS/HR: 9 INJECTION, SOLUTION INTRAVENOUS at 10:15

## 2022-01-01 RX ADMIN — DEXAMETHASONE SODIUM PHOSPHATE 6 MG: 10 INJECTION INTRAMUSCULAR; INTRAVENOUS at 13:23

## 2022-01-01 RX ADMIN — MORPHINE SULFATE 2 MG: 2 INJECTION, SOLUTION INTRAMUSCULAR; INTRAVENOUS at 04:38

## 2022-01-01 RX ADMIN — SODIUM CHLORIDE 3.9 UNITS/HR: 9 INJECTION, SOLUTION INTRAVENOUS at 13:50

## 2022-01-01 RX ADMIN — SODIUM CHLORIDE, PRESERVATIVE FREE 10 ML: 5 INJECTION INTRAVENOUS at 22:04

## 2022-01-01 RX ADMIN — DOCUSATE SODIUM 100 MG: 100 CAPSULE ORAL at 08:28

## 2022-01-01 RX ADMIN — INSULIN LISPRO 3 UNITS: 100 INJECTION, SOLUTION INTRAVENOUS; SUBCUTANEOUS at 21:04

## 2022-01-01 RX ADMIN — DEXAMETHASONE SODIUM PHOSPHATE 6 MG: 10 INJECTION INTRAMUSCULAR; INTRAVENOUS at 15:04

## 2022-01-01 RX ADMIN — DEXTROSE MONOHYDRATE 50 ML/HR: 5 INJECTION, SOLUTION INTRAVENOUS at 15:30

## 2022-01-01 RX ADMIN — Medication 1 AMPULE: at 10:17

## 2022-01-01 RX ADMIN — ENOXAPARIN SODIUM 40 MG: 100 INJECTION SUBCUTANEOUS at 10:04

## 2022-01-01 RX ADMIN — Medication 1 AMPULE: at 09:03

## 2022-01-01 RX ADMIN — INSULIN LISPRO 2 UNITS: 100 INJECTION, SOLUTION INTRAVENOUS; SUBCUTANEOUS at 09:47

## 2022-01-01 RX ADMIN — SODIUM CHLORIDE, PRESERVATIVE FREE 10 ML: 5 INJECTION INTRAVENOUS at 07:00

## 2022-01-01 RX ADMIN — SODIUM CHLORIDE 40 MG: 9 INJECTION INTRAMUSCULAR; INTRAVENOUS; SUBCUTANEOUS at 18:48

## 2022-01-01 RX ADMIN — POTASSIUM CHLORIDE 20 MEQ: 14.9 INJECTION, SOLUTION INTRAVENOUS at 16:48

## 2022-01-01 RX ADMIN — SODIUM CHLORIDE, PRESERVATIVE FREE 10 ML: 5 INJECTION INTRAVENOUS at 13:19

## 2022-01-01 RX ADMIN — DEXTROSE MONOHYDRATE 50 ML/HR: 5 INJECTION, SOLUTION INTRAVENOUS at 10:28

## 2022-01-01 RX ADMIN — NYSTATIN: 100000 POWDER TOPICAL at 10:04

## 2022-01-01 RX ADMIN — INSULIN LISPRO 6 UNITS: 100 INJECTION, SOLUTION INTRAVENOUS; SUBCUTANEOUS at 04:44

## 2022-01-01 RX ADMIN — AMIODARONE HYDROCHLORIDE 300 MG: 50 INJECTION, SOLUTION INTRAVENOUS at 09:19

## 2022-01-01 RX ADMIN — INSULIN LISPRO 3 UNITS: 100 INJECTION, SOLUTION INTRAVENOUS; SUBCUTANEOUS at 17:11

## 2022-01-01 RX ADMIN — INSULIN LISPRO 3 UNITS: 100 INJECTION, SOLUTION INTRAVENOUS; SUBCUTANEOUS at 20:47

## 2022-01-01 RX ADMIN — INSULIN GLARGINE 20 UNITS: 100 INJECTION, SOLUTION SUBCUTANEOUS at 09:30

## 2022-01-01 RX ADMIN — SODIUM CHLORIDE 40 MG: 9 INJECTION INTRAMUSCULAR; INTRAVENOUS; SUBCUTANEOUS at 09:03

## 2022-01-01 RX ADMIN — BUDESONIDE AND FORMOTEROL FUMARATE DIHYDRATE 2 PUFF: 80; 4.5 AEROSOL RESPIRATORY (INHALATION) at 08:52

## 2022-01-01 RX ADMIN — CALCIUM CHLORIDE 1000 MG: 100 INJECTION, SOLUTION INTRAVENOUS; INTRAVENTRICULAR at 09:01

## 2022-01-01 RX ADMIN — POTASSIUM CHLORIDE 20 MEQ: 14.9 INJECTION, SOLUTION INTRAVENOUS at 04:33

## 2022-01-01 RX ADMIN — BUDESONIDE AND FORMOTEROL FUMARATE DIHYDRATE 2 PUFF: 80; 4.5 AEROSOL RESPIRATORY (INHALATION) at 19:50

## 2022-01-01 RX ADMIN — PANTOPRAZOLE SODIUM 40 MG: 40 TABLET, DELAYED RELEASE ORAL at 06:19

## 2022-01-01 RX ADMIN — ENOXAPARIN SODIUM 40 MG: 100 INJECTION SUBCUTANEOUS at 08:29

## 2022-01-12 PROBLEM — E66.9 OBESITY: Chronic | Status: ACTIVE | Noted: 2022-01-01

## 2022-01-12 PROBLEM — A41.9 SEPSIS (HCC): Status: ACTIVE | Noted: 2022-01-01

## 2022-01-12 PROBLEM — J96.01 ACUTE RESPIRATORY FAILURE WITH HYPOXIA (HCC): Status: ACTIVE | Noted: 2022-01-01

## 2022-01-12 PROBLEM — R09.02 HYPOXIA: Status: ACTIVE | Noted: 2022-01-01

## 2022-01-12 PROBLEM — S42.213A CLOSED FRACTURE OF SURGICAL NECK OF HUMERUS: Status: ACTIVE | Noted: 2022-01-01

## 2022-01-12 PROBLEM — N39.0 UTI (URINARY TRACT INFECTION): Status: ACTIVE | Noted: 2022-01-01

## 2022-01-12 PROBLEM — J18.9 PNEUMONIA: Status: ACTIVE | Noted: 2022-01-01

## 2022-01-12 NOTE — PROGRESS NOTES
ACUTE PHYSICAL THERAPY GOALS:  (Developed with and agreed upon by patient and/or caregiver. )  LTG:  (1.)Ms. Tony Estrada will move from supine to sit and sit to supine , scoot up and down and roll side to side in bed with MODERATE ASSIST within 7 treatment day(s). (2.)Ms. Tony Estrada will transfer from bed to chair and chair to bed with MAXIMAL ASSIST using the least restrictive device within 7 treatment day(s). (3.)Ms. Tony Estrada will ambulate with MAXIMAL ASSIST for 10 feet with the least restrictive device within 7 treatment day(s). (4.)Ms. Tony Estrada will perform exercises per HEP independently to improve strength and mobility within 7 days.   ________________________________________________________________________________________________      PHYSICAL THERAPY ASSESSMENT: Initial Assessment and PM PT Treatment Day # 1      Imelda Johnson is a 57 y.o. female   PRIMARY DIAGNOSIS: Acute respiratory failure with hypoxia (HCC)  Pneumonia [J18.9]       Reason for Referral:  Respiratory failure, pneumonia  ICD-10: Treatment Diagnosis: Generalized Muscle Weakness (M62.81)  Difficulty in walking, Not elsewhere classified (R26.2)  Other abnormalities of gait and mobility (R26.89)  INPATIENT: Payor:  / Plan: Eliel Ho 73 / Product Type:  /     ASSESSMENT:     REHAB RECOMMENDATIONS:   Recommendation to date pending progress:  Setting:   Short-term Rehab  Equipment:    To Be Determined     PRIOR LEVEL OF FUNCTION:  (Prior to Hospitalization) INITIAL/CURRENT LEVEL OF FUNCTION:  (Most Recently Demonstrated)   Bed Mobility:   Modified Independent  Sit to Stand:   Modified Independent  Transfers:   Modified Independent  Gait/Mobility:   Standby Assistance Bed Mobility:   Maximal Assistance  Sit to Stand:   Unable to perform  Transfers:   Unable to perform  Gait/Mobility:   Unable to perform     ASSESSMENT:  Ms. Tony Estrada is admitted from home with respiratory failure and pneumonia; she is s/p recent left humerus fracture sustained on a cruise on 1/3. Left UE currently in sling. Pt reports she has been ambulatory for short distance with cane since this time; typically uses rollator. Presents today with c/o left shoulder pain and chronic back pain. Reports neuropathy in B hands and feet, worse on left side. Attempted transfer to sitting twice- once with bed flat then once with head of bed elevated- unable to reach sitting either time due to c/o severe pain. Moved to chair position and tolerates fair but c/o worse back pain. LE assessment shows decreased sensation on L compared to R and generalized weakness. Unable to perform further mobility; returned to supine and positioned comfortably with needs in reach. Anticipate pt will need rehab at [place]. SUBJECTIVE:   Ms. Rosalinda Coyle states, \"It hurts too bad\"    SOCIAL HISTORY/LIVING ENVIRONMENT: Lives with spouse and son in first floor apartment. Typically ambulatory with rollator. Has been using cane for limited distance after recent left humerus fx sustained on cruise.   Home Environment: Apartment  # Steps to Enter: 0  One/Two Story Residence: One story  Living Alone: No  Support Systems: Spouse/Significant Other,Child(chava)  OBJECTIVE:     PAIN: VITAL SIGNS: LINES/DRAINS:   Pre Treatment: Pain Screen  Pain Scale 1: Numeric (0 - 10)  Pain Intensity 1: 5  Pain Location 1: Shoulder  Pain Orientation 1: Left  Pain Intervention(s) 1: Repositioned  Post Treatment: 0/10 Vital Signs  O2 Device: None (Room air) none  O2 Device: None (Room air)     GROSS EVALUATION:   Within Functional Limits Abnormal/ Functional Abnormal/ Non-Functional (see comments) Not Tested Comments:   AROM [] [x] [] []    PROM [] [] [] []    Strength [] [x] [] []    Balance [] [x] [] []    Posture [] [x] [] []    Sensation [] [x] [] [] Decreased B hands/feet, particularly on left side   Coordination [] [x] [] []    Tone [] [] [] []    Edema [] [] [] []    Activity Tolerance [] [x] [] []     [] [] [] []      COGNITION/  PERCEPTION: Intact Impaired   (see comments) Comments:   Orientation [] [x]    Vision [] []    Hearing [] [x]    Command Following [x] []    Safety Awareness [] [x]     [] []      MOBILITY: I Mod I S SBA CGA Min Mod Max Total  NT x2 Comments:   Bed Mobility    Rolling [] [] [] [] [] [] [] [x] [] [] []    Supine to Sit [] [] [] [] [] [] [] [x] [] [] [] Unable to fully sit due to pain   Scooting [] [] [] [] [] [] [] [] [] [] []    Sit to Supine [] [] [] [] [] [] [] [] [] [] []    Transfers    Sit to Stand [] [] [] [] [] [] [] [] [] [x] []    Bed to Chair [] [] [] [] [] [] [] [] [] [x] []    Stand to Sit [] [] [] [] [] [] [] [] [] [x] []    I=Independent, Mod I=Modified Independent, S=Supervision, SBA=Standby Assistance, CGA=Contact Guard Assistance,   Min=Minimal Assistance, Mod=Moderate Assistance, Max=Maximal Assistance, Total=Total Assistance, NT=Not Tested  GAIT: I Mod I S SBA CGA Min Mod Max Total  NT x2 Comments:   Level of Assistance [] [] [] [] [] [] [] [] [] [x] []    Distance NA    DME N/A    Gait Quality NA    Weightbearing Status N/A     I=Independent, Mod I=Modified Independent, S=Supervision, SBA=Standby Assistance, CGA=Contact Guard Assistance,   Min=Minimal Assistance, Mod=Moderate Assistance, Max=Maximal Assistance, Total=Total Assistance, NT=Not Tested    [address] [facility] [address] [address] [facility] Form       How much difficulty does the patient currently have. .. Unable A Lot A Little None   1. Turning over in bed (including adjusting bedclothes, sheets and blankets)? [] 1   [x] 2   [] 3   [] 4   2. Sitting down on and standing up from a chair with arms ( e.g., wheelchair, bedside commode, etc.)   [x] 1   [] 2   [] 3   [] 4   3. Moving from lying on back to sitting on the side of the bed? [x] 1   [] 2   [] 3   [] 4   How much help from another person does the patient currently need. .. Total A Lot A Little None   4.   Moving to and from a bed to a chair (including a wheelchair)? [x] 1   [] 2   [] 3   [] 4   5. Need to walk in hospital room? [x] 1   [] 2   [] 3   [] 4   6. Climbing 3-5 steps with a railing? [x] 1   [] 2   [] 3   [] 4   © 2007, Trustees of [address] [place] [address] [address] [address], under license to [employer]. All rights reserved     Score:  Initial: 7 Most Recent:  (Date: -- )    Interpretation of Tool:  Represents activities that are increasingly more difficult (i.e. Bed mobility, Transfers, Gait). PLAN:   FREQUENCY/DURATION: PT Plan of Care: 3 times/week for duration of hospital stay or until stated goals are met, whichever comes first.    PROBLEM LIST:   (Skilled intervention is medically necessary to address:)  1. Decreased Activity Tolerance  2. Decreased AROM/PROM  3. Decreased Balance  4. Decreased Gait Ability  5. Decreased Strength  6. Decreased Transfer Abilities  7. Increased Pain   INTERVENTIONS PLANNED:   (Benefits and precautions of physical therapy have been discussed with the patient.)  1. Therapeutic Activity  2. Therapeutic Exercise/HEP  3. Neuromuscular Re-education  4. Gait Training  5. Manual Therapy  6. Education     TREATMENT:     EVALUATION: Moderate Complexity : (Untimed Charge)    TREATMENT:   ($$ Therapeutic Activity: 8-22 mins    )  Therapeutic Activity (9 Minutes): Therapeutic activity included Supine to Sit, Scooting and Sitting balance  to improve functional Mobility, Strength, ROM, Activity tolerance and balance.     TREATMENT GRID:  N/A    AFTER TREATMENT POSITION/PRECAUTIONS:  Alarm Activated, Bed, Needs within reach and RN notified    INTERDISCIPLINARY COLLABORATION:  RN/PCT and PT/PTA    TOTAL TREATMENT DURATION:  PT Patient Time In/Time Out  Time In: 1315  Time Out: [address], DPT

## 2022-01-12 NOTE — ED NOTES
TRANSFER - OUT REPORT:    Verbal report given to JANE Levin on Deo Blevins  being transferred to [id number] for routine progression of care       Report consisted of patients Situation, Background, Assessment and   Recommendations(SBAR). Information from the following report(s) SBAR was reviewed with the receiving nurse. Lines:   Peripheral IV 01/12/22 Right Antecubital (Active)        Opportunity for questions and clarification was provided.       Patient transported with:   [employer]

## 2022-01-12 NOTE — ED TRIAGE NOTES
Pt brought in by [facility] #[id number]. Per EMS, pt fell tonight and called for lift assist. When the fire department arrived, pt's O2 sat was 86% on RA. EMS was then called. Pt's O2 sat here is 89% of RA. Pt placed on 2L of O2 via NC while in triage. Per EMS, pt fell last week as well and has a left shoulder injury from then. EMS also states that pt has been taking more of her Norco than prescribed. Pt denies new pain/injury. Denies hitting head. Denies head/neck/back pain. Post triage, pt c/o right hip pain.

## 2022-01-12 NOTE — PROGRESS NOTES
TRANSFER - IN REPORT:    Verbal report received from Sharmila Ny RN on Elnor Po  being received from [facility], RN for routine progression of care      Report consisted of patients Situation, Background, Assessment and   Recommendations(SBAR). Information from the following report(s) SBAR was reviewed with the receiving nurse. Opportunity for questions and clarification was provided. Assessment completed upon patients arrival to unit and care assumed.

## 2022-01-12 NOTE — PROGRESS NOTES
Consult Received:     Left proximal humerus fracture from fall on a cruise ship trip on January 3. Patient admitted by hospitalist for pneumonia. Dr. Tc Spence was consulted directly by the hospitalist for this patient and has requested a CT scan of the left shoulder. The patient will need to recover from her pneumonia prior to surgical treatment with Dr. Tc Spence and he will see her as an outpatient when she is recovered from this. I will see the patient and discuss this with her. She may have a sling for comfort. Fall precautions.

## 2022-01-12 NOTE — ED PROVIDER NOTES
57 y.o female presents from home, by way of ems, for evaluation of fall and hypoxia. This evening her  was helping her back to bed, after she had used the bathroom. He had his arms around her as in bear hug at the bedside, when she suddenly went limp, and slid out of his arms to the floor. She slis down the side of the bed, so it was a pretty controlled descent. Son and  were unable to get her off the floor, due to a left proximal humeral fracture, sustained on a cruise in the [facility]. Monday jan 3rd she sustained a fall in their cruise cabin, and then got off the boat to go to the hospital in [place], where a splint was applied to the left arm, with a sling. The boat LEFT THEM there. And they only made their stateside again, Thursday Jan 6th. She is due to see her pcp in the am who may be making a local ortho referral.    When 1st responders / ems arrived, she was noted to be slightly hypoxic around 88% on room air, but this quickly corrected with nasal cannula supplementation. Her  thinks she is overmedicating with pain pills, and that this led to her wilting in his arms. She sees a chronic pain management doctor,and is on regularly scheduled opiates, but has been on more lately since the arm fracture. Pt falls fairly frequently, due to neuropathy from her diabetes    Above history taken from her  over the phone. Pt is vague on answers, and difficult to pin down on time frames of events, she thought the fall was around 13:30, when in fact it was around 19:30           No past medical history on file. No past surgical history on file. No family history on file.     Social History     Socioeconomic History    Marital status:      Spouse name: Not on file    Number of children: Not on file    Years of education: Not on file    Highest education level: Not on file   Occupational History    Not on file   Tobacco Use    Smoking status: Not on file    Smokeless tobacco: Not on file   Substance and Sexual Activity    Alcohol use: Not on file    Drug use: Not on file    Sexual activity: Not on file   Other Topics Concern    Not on file   Social History Narrative    Not on file     Social Determinants of Health     Financial Resource Strain:     Difficulty of Paying Living Expenses: Not on file   Food Insecurity:     Worried About Running Out of Food in the Last Year: Not on file    Thad of Food in the Last Year: Not on file   Transportation Needs:     Lack of Transportation (Medical): Not on file    Lack of Transportation (Non-Medical): Not on file   Physical Activity:     Days of Exercise per Week: Not on file    Minutes of Exercise per Session: Not on file   Stress:     Feeling of Stress : Not on file   Social Connections:     Frequency of Communication with Friends and Family: Not on file    Frequency of Social Gatherings with Friends and Family: Not on file    Attends [employer] Services: Not on file    Active Member of [address] [place] [address] or Organizations: Not on file    Attends Club or Organization Meetings: Not on file    Marital Status: Not on file   Intimate Partner Violence:     Fear of Current or Ex-Partner: Not on file    Emotionally Abused: Not on file    Physically Abused: Not on file    Sexually Abused: Not on file   Housing Stability:     Unable to Pay for Housing in the Last Year: Not on file    Number of [place] in the Last Year: Not on file    Unstable Housing in the Last Year: Not on file         ALLERGIES: Other food, Contrast agent [iodine], and Banana    Review of Systems   Unable to perform ROS: Other   Constitutional: Positive for activity change and fatigue. Negative for chills and fever. HENT: Positive for voice change. Eyes: Negative for discharge and redness. Respiratory: Positive for shortness of breath. Cardiovascular: Negative for chest pain.    Gastrointestinal: Negative for abdominal pain, diarrhea, nausea and vomiting. Musculoskeletal: Positive for arthralgias, gait problem and joint swelling. Skin: Positive for color change. Negative for wound. Neurological: Negative for headaches. Hematological: Bruises/bleeds easily. All other systems reviewed and are negative. Vitals:    01/11/22 2209 01/11/22 2256   BP: (!) 108/52    Pulse: 97    Resp: 15    Temp: 100.3 °F (37.9 °C)    SpO2: 93% 90%   Weight: 99.8 kg (220 lb)    Height: 5' 2\" (1.575 m)             Physical Exam  Vitals and nursing note reviewed. Constitutional:       General: She is not in acute distress. Appearance: Normal appearance. She is well-developed. She is not ill-appearing, toxic-appearing or diaphoretic. HENT:      Head: Normocephalic and atraumatic. Right Ear: External ear normal.      Left Ear: External ear normal.      Nose: Nose normal. No rhinorrhea. Mouth/Throat:      Mouth: Mucous membranes are dry. Eyes:      General: No scleral icterus. Right eye: No discharge. Left eye: No discharge. Extraocular Movements: Extraocular movements intact. Conjunctiva/sclera: Conjunctivae normal.      Pupils: Pupils are equal, round, and reactive to light. Comments: Pupils 3mm bilaterally at time of exam   Cardiovascular:      Rate and Rhythm: Normal rate and regular rhythm. Pulmonary:      Effort: Pulmonary effort is normal. No respiratory distress. Breath sounds: Examination of the right-middle field reveals decreased breath sounds. Examination of the left-middle field reveals decreased breath sounds. Examination of the right-lower field reveals decreased breath sounds. Examination of the left-lower field reveals decreased breath sounds. Decreased breath sounds present. No wheezing or rales. Abdominal:      General: Abdomen is flat. Palpations: Abdomen is soft. Tenderness: There is no abdominal tenderness. There is no guarding or rebound.    Musculoskeletal:         General: Tenderness and signs of injury present. Arms:       Cervical back: Normal range of motion and neck supple. Skin:     General: Skin is warm and dry. Findings: No rash. Neurological:      General: No focal deficit present. Mental Status: She is alert and oriented to person, place, and time. Mental status is at baseline. Motor: No abnormal muscle tone. Comments: cni 2-12 grossly  Nl gait,  Nl speech     Psychiatric:         Mood and Affect: Mood normal.         Behavior: Behavior normal.          MDM  Number of Diagnoses or Management Options  Closed fracture of proximal end of left humerus, unspecified fracture morphology, initial encounter: new and requires workup  Community acquired pneumonia of left lower lobe of lung: new and requires workup  Hypoxia: new and requires workup  Diagnosis management comments: Medical decision making note:  Subacute left proximal humeral fracture already in a splint  Mild hypoxia presumed to be due to oversedation from opiates. After an observation period and an empiric DuoNeb, pt had resting sats of 90-91% on room air, and was deemed suitable for discharge. The atelectasis vs infiltrate at LLL was favored to be atelectasis. At time of discharge, jaylene was unable to stand, so a pneumonia workup was undertaken to further evaluate the LLL density on the cxr. Lab review and final dispo turned over to dr Louis Villa, after her intial attempt at discharge was unsuccessful  This concludes the \"medical decision making note\" part of this emergency department visit note. Amount and/or Complexity of Data Reviewed  Clinical lab tests: ordered  Tests in the radiology section of CPT®: reviewed and ordered (XR SHOULDER LT AP/LAT MIN 2 V   Final Result    Humeral head and neck fracture. XR HIP RT W OR WO PELV 2-3 VWS   Final Result    No acute process.      XR CHEST PORT   Final Result    Mild left lower lobe atelectasis or pneumonia.     )  Decide to obtain previous medical records or to obtain history from someone other than the patient: yes  Obtain history from someone other than the patient: yes ( via phone)  Discuss the patient with other providers: (Hospitalist service)    Risk of Complications, Morbidity, and/or Mortality  Presenting problems: moderate  Diagnostic procedures: low  Management options: low    Patient Progress  Patient progress: improved    ED Course as of 01/12/22 0230   Wed Jan 12, 2022   0220 Patient originally evaluated by Dr. Joselyn Devi for reports of fall at home. X-ray imaging felt to be negative, patient subsequently set for discharge. Nursing staff states patient was unable to ambulate to the wheelchair on her own power and was helped to the floor when attempting to do so. Reevaluation, patient looks very dry and presented with initial fever of 100.3. Will obtain COVID swab, plan for antibiotics as there is mention of developing infiltrate on x-ray obtained earlier. Orders placed for labs as well.   Patient will likely require admission given her inability to ambulate under her own power, reports of hypoxia and now what seems to be bacterial pneumonia [BR]      ED Course User Index  [BR] Destiny Wright, DO       Procedures

## 2022-01-12 NOTE — H&P
[facility] Hospitalist History and Physical       Name:  Robbie Espinoza  Age:61 y.o. Sex:female   :  1960    MRN:  [id number]   PCP:  Quincy Rivers MD      Admit Date:  2022 10:04 PM   Chief Complaint: \"Pt brought in by [id number]. Per EMS, pt fell tonight and called for lift assist. When the fire department arrived, pt's O2 sat was 86% on RA. EMS was then called. Pt's O2 sat here is 89% of RA. Pt placed on 2L of O2 via NC while in triage. Per EMS, pt fell last week as well and has a left shoulder injury from then. EMS also states that pt has been taking more of her Norco than prescribed. Pt denies new pain/injury. Denies hitting head. Denies head/neck/back pain. Post triage, pt c/o right hip pain\"    Reason for Admission:   Pneumonia [J18.9] - 58 yo woman, h/o HTN, DM, recent fall while on cruise resulting in humerus fracture, now with another fall at home, found to have pneumonia with hypoxia and UTI. SHe has fever, leukocytosis, soft BP. COVID NEG in ER    Assessment & Plan:     Principal Problem:    Pneumonia (2022)        Active Problems:    UTI (urinary tract infection) (2022)          Hypoxia (2022)          Obesity (2022)          Closed fracture of surgical neck of humerus (2022)            PLAN:  1) Admit to med bed, Inpatient status due to medical complexity, comorbidities, and medically necessary treatment not readily available as outpatient as outlined below  2) Will continue IV antibiotics as started in ER for PNA   3) Continue oxygen by nasal canula to keep saturations >90%  4) monitor and trend WBC. Follow up blood cultures  5) anti-tussives, supportive care. 6) due to soft BP will hold her BP meds at this time and restart as appropriate  7) Monitor BG , and SSI; start basal bolus insulin as appropriate   8) She will need outpatient orthopedic followup.  I believe this is being arranged by PCP per chart notes  9) Fall precautions          Disposition/Expected LOS: 3  Diet: DIET ADULT  VTE ppx: lovenox  GI ppx:   Code status: Full Code  Surrogate decision-maker:       History of Presenting Illness:     Amarjit Romero is a 57 y.o. female with medical history of DM2, HTN, obesity, JESSICA who presented to ED from home, by way of ems, for evaluation of fall and hypoxia. This evening her  was helping her back to bed, after she had used the bathroom. He had his arms around her as in bear hug at the bedside, when she suddenly went limp, and slid out of his arms to the floor. She slid down the side of the bed, so it was a controlled descent.     Son and  were unable to get her off the floor, due to a left proximal humeral fracture, sustained 1/3/22 on a cruise in the [facility]. Patient sustained a fall in their cruise cabin, and then got off the boat to go to the hospital in [place], where a splint was applied to the left arm, with a sling. The boat LEFT THEM there. And they only made their way stateside again, Thursday Jan 6th. She is due to see her pcp in the am who may be making a local ortho referral.     When 1st responders / ems arrived, she was noted to be slightly hypoxic around 88% on room air, but this quickly corrected with nasal cannula supplementation. Her  thinks she is overmedicating with pain pills, and that this led to her wilting in his arms. She sees a chronic pain management doctor,and is on regularly scheduled opiates, but has been on more lately since the arm fracture.     Pt falls fairly frequently, due to neuropathy from her diabetes     Above history taken from her  over the phone by ER MD.  Pt is vague on answers, and difficult to pin down on time frames of events, she thought the fall was around 13:30, when in fact it was around 19:30    In ER, workup showed low grade temp of 100.3, soft /52, elevated WBC. CXR showed pneumonia LLL as well as UTI.  She has received pfizer vaccine x 2, and was COVID NEG in ER. Patient was kept on oxygen, given IV abx and hospitalist asked to admit       Review of Systems:  A 14 point review of systems was taken and pertinent positive as per HPI. Past Medical History:   Diagnosis Date    Asthma     Diabetes mellitus ([address]     History of CVA (cerebrovascular accident)     Neuropathy     Obesity     Sleep apnea        Past Surgical History:   Procedure Laterality Date    HX BREAST BIOPSY      HX  SECTION      HX REFRACTIVE SURGERY         Family History : reviewed  Family History   Problem Relation Age of Onset    Cancer Mother     COPD Father         Social History     Tobacco Use    Smoking status: Passive Smoke Exposure - Never Smoker    Smokeless tobacco: Never Used   Substance Use Topics    Alcohol use: Never       Allergies   Allergen Reactions    Other Food Swelling     Shell fish    Contrast Agent [Iodine] Anaphylaxis    Banana Itching and Swelling     Mouth itches and swells       Immunization History   Administered Date(s) Administered    COVID-19, PFIZER, MRNA, LNP-S, PF, 30MCG/0.3ML DOSE 2021, 2021         PTA Medications:  Current Outpatient Medications   Medication Instructions    amitriptyline (ELAVIL) 25 mg, Oral, BEDTIME PRN    amLODIPine (NORVASC) 5 mg, Oral, DAILY    aspirin delayed-release 81 mg, Oral, DAILY    atorvastatin (LIPITOR) 20 mg, Oral, DAILY    empagliflozin (JARDIANCE) 10 mg, Oral, DAILY    fluticasone propion-salmeteroL (Advair HFA) 115-21 mcg/actuation inhaler 2 Puffs, Inhalation, 2 TIMES DAILY    fluticasone propionate (Flonase Allergy Relief) 50 mcg/actuation nasal spray 2 Sprays, Both Nostrils, DAILY    lisinopriL (PRINIVIL, ZESTRIL) 20 mg, Oral, DAILY    meclizine (ANTIVERT) 25 mg, Oral, 3 TIMES DAILY AS NEEDED    montelukast (SINGULAIR) 10 mg, Oral, DAILY    naloxone (Narcan) 4 mg/actuation nasal spray Use 1 spray intranasally, then discard.  Repeat with new spray every 2 min as needed for opioid overdose symptoms, alternating nostrils.  omeprazole (PRILOSEC) 20 mg, Oral, DAILY    ondansetron (ZOFRAN ODT) 4 mg, Oral, EVERY 8 HOURS AS NEEDED    pregabalin (LYRICA) 300 mg, Oral, 2 TIMES DAILY    sertraline (ZOLOFT) 100 mg, Oral, DAILY       Objective:     Patient Vitals for the past 24 hrs:   Temp Pulse Resp BP SpO2   01/12/22 0646    (!) 115/59    01/11/22 2256     90 %   01/11/22 2209 100.3 °F (37.9 °C) 97 15 (!) 108/52 93 %       Oxygen Therapy  O2 Sat (%): 90 % (01/11/22 2256)  Pulse via Oximetry: 94 beats per minute (01/11/22 2256)  O2 Device: None (Room air) (01/11/22 2256)    Body mass index is 40.24 kg/m². Physical Exam:    General:  Alert and oriented x 3, No acute distress, speaking in full sentences, appears chronically ill. Obese  HEENT:  Pupils equal and reactive to light and accommodation, oropharynx is clear   Neck:   Supple, no lymphadenopathy, no JVD   Lungs:  Coarse breath sounds bilaterally   CV:   Regular rate, regular rhythm, with normal S1 and S2   Abdomen:  Soft, nontender, nondistended, normoactive bowel sounds   Extremities:  No cyanosis clubbing or edema. Left arm in sling   Neuro:  Nonfocal, A&O x3   Psych:  Normal mood and affect       Data Reviewed: I have reviewed all labs, meds, and studies.       Recent Results (from the past 24 hour(s))   EKG, 12 LEAD, INITIAL    Collection Time: 01/11/22 10:21 PM   Result Value Ref Range    Ventricular Rate 94 BPM    Atrial Rate 93 BPM    P-R Interval 177 ms    QRS Duration 92 ms    Q-T Interval 355 ms    QTC Calculation (Bezet) 444 ms    Calculated P Axis 58 degrees    Calculated R Axis 124 degrees    Calculated T Axis 64 degrees    Diagnosis       Sinus rhythm  Anterior infarct, old  Minimal ST elevation, inferior leads     COVID-19 RAPID TEST    Collection Time: 01/12/22  2:27 AM   Result Value Ref Range    Specimen source NASAL      COVID-19 rapid test Not detected NOTD     CULTURE, URINE Collection Time: 01/12/22  2:27 AM    Specimen: Clean catch; Urine   Result Value Ref Range    Special Requests: NO SPECIAL REQUESTS      Culture result:        NO GROWTH AFTER SHORT PERIOD OF INCUBATION. FURTHER RESULTS TO FOLLOW AFTER OVERNIGHT INCUBATION. CBC WITH AUTOMATED DIFF    Collection Time: 01/12/22  2:29 AM   Result Value Ref Range    WBC 14.6 (H) 4.3 - 11.1 K/uL    RBC 4.37 4.05 - 5.2 M/uL    HGB 12.0 11.7 - 15.4 g/dL    HCT 37.8 35.8 - 46.3 %    MCV 86.5 79.6 - 97.8 FL    MCH 27.5 26.1 - 32.9 PG    MCHC 31.7 31.4 - 35.0 g/dL    RDW 14.6 11.9 - 14.6 %    PLATELET 560 249 - 765 K/uL    MPV 8.7 (L) 9.4 - 12.3 FL    ABSOLUTE NRBC 0.00 0.0 - 0.2 K/uL    DF AUTOMATED      NEUTROPHILS 74 43 - 78 %    LYMPHOCYTES 16 13 - 44 %    MONOCYTES 8 4.0 - 12.0 %    EOSINOPHILS 1 0.5 - 7.8 %    BASOPHILS 0 0.0 - 2.0 %    IMMATURE GRANULOCYTES 1 0.0 - 5.0 %    ABS. NEUTROPHILS 10.8 (H) 1.7 - 8.2 K/UL    ABS. LYMPHOCYTES 2.4 0.5 - 4.6 K/UL    ABS. MONOCYTES 1.2 0.1 - 1.3 K/UL    ABS. EOSINOPHILS 0.1 0.0 - 0.8 K/UL    ABS. BASOPHILS 0.1 0.0 - 0.2 K/UL    ABS. IMM. GRANS. 0.1 0.0 - 0.5 K/UL   METABOLIC PANEL, COMPREHENSIVE    Collection Time: 01/12/22  2:29 AM   Result Value Ref Range    Sodium 137 136 - 145 mmol/L    Potassium 4.1 3.5 - 5.1 mmol/L    Chloride 102 98 - 107 mmol/L    CO2 27 21 - 32 mmol/L    Anion gap 8 7 - 16 mmol/L    Glucose 150 (H) 65 - 100 mg/dL    BUN 17 8 - 23 MG/DL    Creatinine 0.60 0.6 - 1.0 MG/DL    GFR est AA >60 >60 ml/min/1.73m2    GFR est non-AA >60 >60 ml/min/1.73m2    Calcium 8.7 8.3 - 10.4 MG/DL    Bilirubin, total 0.8 0.2 - 1.1 MG/DL    ALT (SGPT) 12 12 - 65 U/L    AST (SGOT) 8 (L) 15 - 37 U/L    Alk.  phosphatase 63 50 - 136 U/L    Protein, total 6.9 6.3 - 8.2 g/dL    Albumin 2.9 (L) 3.2 - 4.6 g/dL    Globulin 4.0 (H) 2.3 - 3.5 g/dL    A-G Ratio 0.7 (L) 1.2 - 3.5     URINALYSIS W/ RFLX MICROSCOPIC    Collection Time: 01/12/22  2:29 AM   Result Value Ref Range    Color YELLOW Appearance CLEAR      Specific gravity 1.035 (H) 1.001 - 1.023      pH (UA) 5.0 5.0 - 9.0      Protein Negative NEG mg/dL    Glucose >1,000 mg/dL    Ketone TRACE (A) NEG mg/dL    Bilirubin Negative NEG      Blood Negative NEG      Urobilinogen 1.0 0.2 - 1.0 EU/dL    Nitrites Positive (A) NEG      Leukocyte Esterase SMALL (A) NEG      WBC 20-50 0 /hpf    RBC 0-3 0 /hpf    Epithelial cells 0 0 /hpf    Bacteria 4+ (H) 0 /hpf    Casts 0 0 /lpf   LACTIC ACID    Collection Time: 01/12/22  2:29 AM   Result Value Ref Range    Lactic acid 1.0 0.4 - 2.0 MMOL/L       EKG Results     Procedure 720 Value Units Date/Time    EKG 12 LEAD INITIAL [[id number]]     Order Status: Completed     EKG 12 LEAD INITIAL [[id number]] Collected: 01/11/22 2221    Order Status: Completed Updated: 01/11/22 2236     Ventricular Rate 94 BPM      Atrial Rate 93 BPM      P-R Interval 177 ms      QRS Duration 92 ms      Q-T Interval 355 ms      QTC Calculation (Bezet) 444 ms      Calculated P Axis 58 degrees      Calculated R Axis 124 degrees      Calculated T Axis 64 degrees      Diagnosis --     Sinus rhythm  Anterior infarct, old  Minimal ST elevation, inferior leads            All Micro Results     Procedure Component Value Units Date/Time    CULTURE, URINE [[id number]] Collected: 01/12/22 0227    Order Status: Completed Specimen: Urine from Clean catch Updated: 01/12/22 0657     Special Requests: NO SPECIAL REQUESTS        Culture result:       NO GROWTH AFTER SHORT PERIOD OF INCUBATION. FURTHER RESULTS TO FOLLOW AFTER OVERNIGHT INCUBATION.           CULTURE, BLOOD [[id number]] Collected: 01/12/22 0229    Order Status: Completed Specimen: Blood Updated: 01/12/22 0323    COVID-19 RAPID TEST [[id number]] Collected: 01/12/22 0227    Order Status: Completed Specimen: Nasopharyngeal Updated: 01/12/22 0320     Specimen source NASAL        COVID-19 rapid test Not detected        Comment:      The specimen is NEGATIVE for SARS-CoV-2, the novel coronavirus associated with COVID-19. A negative result does not rule out COVID-19. This test has been authorized by the FDA under an Emergency Use Authorization (EUA) for use by authorized laboratories. Fact sheet for Healthcare Providers: ConventionUpdate.co.nz  Fact sheet for Patients: ConventionUpdate.co.nz       Methodology: Isothermal Nucleic Acid Amplification         CULTURE, BLOOD [[id number]]     Order Status: Sent Specimen: Blood           Other Studies:  XR SHOULDER LT AP/LAT MIN 2 V    Result Date: 1/12/2022  EXAM: Left shoulder x-rays. INDICATION: Pain. COMPARISON: None. TECHNIQUE: 3 views. FINDINGS: There is a comminuted and minimally displaced fracture involving the humeral head and neck. No other fractures are identified. Glenohumeral and acromioclavicular joint alignment are anatomic. Humeral head and neck fracture. XR HIP RT W OR WO PELV 2-3 VWS    Result Date: 1/11/2022  EXAM: Pelvis and right hip x-rays. INDICATION: Pain after falling. COMPARISON: None. TECHNIQUE: Frontal pelvis and two views right hip. FINDINGS: No acute fracture or dislocation. The pelvic ring is intact. No significant degenerative changes. No acute process. XR CHEST PORT    Result Date: 1/11/2022  EXAM: Chest x-ray. INDICATION: Dyspnea. COMPARISON: Prior chest x-ray on March 23, 2020. TECHNIQUE: Single frontal view. FINDINGS: There is mild left lower lobe atelectasis or infiltrate, new from prior. The right lung is clear. The heart size is within normal limits for the portable technique. No pneumothorax, vascular congestion or pleural effusion is seen. Degenerative changes in the spine. Mild left lower lobe atelectasis or pneumonia.         Medications:  Medications Administered      Medications Administered     albuterol-ipratropium (DUO-NEB) 2.5 MG-0.5 MG/3 ML     Admin Date  01/11/2022 Action  Given Dose  3 mL Route  Nebulization Administered By  Elena Eric RT          azithromycin (ZITHROMAX) 500 mg in 0.9% sodium chloride 250 mL (VIAL-MATE)     Admin Date  01/12/2022 Action  New Bag Dose  500 mg Rate  250 mL/hr Route  IntraVENous Administered By  Sheridan Cummings RN          cefTRIAXone (ROCEPHIN) 1 g in 0.9% sodium chloride (MBP/ADV) 50 mL MBP     Admin Date  01/12/2022 Action  New Bag Dose  1 g Rate  100 mL/hr Route  IntraVENous Administered By  Sheridan Cummings RN          sodium chloride 0.9 % bolus infusion 1,000 mL     Admin Date  01/12/2022 Action  New Bag Dose  1,000 mL Rate  1,000 mL/hr Route  IntraVENous Administered By  Sheridan Cummings RN                    Signed By: Zaida Lopez MD   [facility] Hospitalist Service    January 12, 2022   6:51 AM

## 2022-01-12 NOTE — PROGRESS NOTES
Dual skin assessment with Thais Brunner, RN. Healing abrasion on L knee. Left shoulder,arm, wrist with bandage intact, sling in place. No pressure sores noted.

## 2022-01-12 NOTE — ED NOTES
This RN with the assistance of Jeffrey Davenport RN were attempting to get the pt into her wheelchair for discharge. Pt was unable to hold her own weight, and was assisted to the floor. This RN with the assistance of Ed López (Charge Nurse), Emmy Dill (medic), and Jeffrey Davenport (RN) placed the pt back onto the stretcher. This RN then reported this to Dr. Shanna Alexander, as Dr. Lashell Burroughs had already left for the day. Dr. Shanna Alexander came and assessed the pt.

## 2022-01-12 NOTE — PROGRESS NOTES
Hospitalist Progress Note   Admit Date:  2022 10:04 PM   Name:  Gaby Vega   Age:  57 y.o. Sex:  female  :  1960   MRN:  [id number]   Room:  [place] [address]    Presenting Complaint: Fall    Reason(s) for Admission: Pneumonia [J18.9]     Hospital Course & Interval History:     Ms. Shaun Tierney is a 58 yo female with PMH of asthma, DM2, CVA, obesity, neuropathy, JESSICA admitted s/p fall with recent left humeral fracture that occurred [place] on [employer]. Not seen by ortho to date. Evaluated per ortho this admit and recommends CT left shoulder and outpatient followup with Dr. Tc Spence. Found hypoxic and meets sepsis criteria due to fever / leukocytosis. Has CXR showing LLL pneumonia and UTI. COVID negative. She is on course of antibiotics. Discharge plans pending. Subjective (22):     Has arm pain, has insomnia, constipated, ate ok, no dyspnea or cough    Assessment & Plan:     Principal Problem:    Acute respiratory failure with hypoxia ([address] (2022)  Active Problems:    Pneumonia (2022)    Sepsis:  · D1 rocephin and azithromycin   · Wean O2 as tolerant   · followup cultures             UTI (urinary tract infection) (2022)  · D1 antibiotics  · followup cultures             Obesity (2022)         Closed fracture of surgical neck of humerus (2022)  ·   Outpatient ortho  · Supportive care   · PT,OT      Prior CVA:  · Continued asa, lipitor      DM2:  · SSI        Dispo/Discharge Planning:      Pending     Diet:  ADULT DIET Regular; 4 carb choices (60 gm/meal)  DVT PPx: lovenox  Code status: Full Code    Hospital Problems as of 2022 Never Reviewed          Codes Class Noted - Resolved POA    Pneumonia ICD-10-CM: J18.9  ICD-9-CM: 486  2022 - Present Unknown        UTI (urinary tract infection) ICD-10-CM: N39.0  ICD-9-CM: 599.0  2022 - Present Unknown        Hypoxia ICD-10-CM: R09.02  ICD-9-CM: 799.02  2022 - Present Unknown        Obesity (Chronic) ICD-10-CM: E66.9  ICD-9-CM: 278.00  1/12/2022 - Present Unknown        Closed fracture of surgical neck of humerus ICD-10-CM: S42.213A  ICD-9-CM: 812.01  1/12/2022 - Present Unknown        * (Principal) Acute respiratory failure with hypoxia (HCC) ICD-10-CM: J96.01  ICD-9-CM: 518.81  1/12/2022 - Present Yes        Sepsis ([facility] [address] ICD-10-CM: A41.9  ICD-9-CM: 038.9, 995.91  1/12/2022 - Present Yes              Objective:     Patient Vitals for the past 24 hrs:   Temp Pulse Resp BP SpO2   01/12/22 1117 98.8 °F (37.1 °C) 95 20 130/76 94 %   01/12/22 1035    (!) 151/88 96 %   01/12/22 1000    135/76 96 %   01/12/22 0930    127/81 96 %   01/12/22 0900    111/81 96 %   01/12/22 0841    (!) 141/56 95 %   01/12/22 0837    (!) 144/116    01/12/22 0646    (!) 115/59    01/11/22 2256     90 %   01/11/22 2209 100.3 °F (37.9 °C) 97 15 (!) 108/52 93 %     Oxygen Therapy  O2 Sat (%): 94 % (01/12/22 1117)  Pulse via Oximetry: 93 beats per minute (01/12/22 1035)  O2 Device: None (Room air) (01/11/22 2256)    Estimated body mass index is 40.24 kg/m² as calculated from the following:    Height as of this encounter: 5' 2\" (1.575 m). Weight as of this encounter: 99.8 kg (220 lb). Intake/Output Summary (Last 24 hours) at 1/12/2022 1316  Last data filed at 1/12/2022 0435  Gross per 24 hour   Intake 1300 ml   Output    Net 1300 ml         Physical Exam:     Blood pressure 130/76, pulse 95, temperature 98.8 °F (37.1 °C), resp. rate 20, height 5' 2\" (1.575 m), weight 99.8 kg (220 lb), SpO2 94 %. General:    Well nourished. No overt distress, elderly  CV:   RRR. No m/r/g. No jugular venous distension. No edema   Lungs:   CTAB. No wheezing, rhonchi, or rales. Respirations even, unlabored anterior   Abdomen: Bowel sounds present. Soft, nontender, nondistended. Extremities: No cyanosis or clubbing. No edema  Skin:     No rashes and normal coloration. Warm and dry. Neuro:   grossly intact. Psych:  Normal mood and affect. I have reviewed ordered lab tests and independently visualized imaging below:    Recent Labs:  Recent Results (from the past 48 hour(s))   EKG, 12 LEAD, INITIAL    Collection Time: 01/11/22 10:21 PM   Result Value Ref Range    Ventricular Rate 94 BPM    Atrial Rate 93 BPM    P-R Interval 177 ms    QRS Duration 92 ms    Q-T Interval 355 ms    QTC Calculation (Bezet) 444 ms    Calculated P Axis 58 degrees    Calculated R Axis 124 degrees    Calculated T Axis 64 degrees    Diagnosis       Sinus rhythm  Anterior infarct, old  Minimal ST elevation, inferior leads     COVID-19 RAPID TEST    Collection Time: 01/12/22  2:27 AM   Result Value Ref Range    Specimen source NASAL      COVID-19 rapid test Not detected NOTD     CULTURE, URINE    Collection Time: 01/12/22  2:27 AM    Specimen: Clean catch; Urine   Result Value Ref Range    Special Requests: NO SPECIAL REQUESTS      Culture result:        NO GROWTH AFTER SHORT PERIOD OF INCUBATION. FURTHER RESULTS TO FOLLOW AFTER OVERNIGHT INCUBATION. CBC WITH AUTOMATED DIFF    Collection Time: 01/12/22  2:29 AM   Result Value Ref Range    WBC 14.6 (H) 4.3 - 11.1 K/uL    RBC 4.37 4.05 - 5.2 M/uL    HGB 12.0 11.7 - 15.4 g/dL    HCT 37.8 35.8 - 46.3 %    MCV 86.5 79.6 - 97.8 FL    MCH 27.5 26.1 - 32.9 PG    MCHC 31.7 31.4 - 35.0 g/dL    RDW 14.6 11.9 - 14.6 %    PLATELET 971 532 - 993 K/uL    MPV 8.7 (L) 9.4 - 12.3 FL    ABSOLUTE NRBC 0.00 0.0 - 0.2 K/uL    DF AUTOMATED      NEUTROPHILS 74 43 - 78 %    LYMPHOCYTES 16 13 - 44 %    MONOCYTES 8 4.0 - 12.0 %    EOSINOPHILS 1 0.5 - 7.8 %    BASOPHILS 0 0.0 - 2.0 %    IMMATURE GRANULOCYTES 1 0.0 - 5.0 %    ABS. NEUTROPHILS 10.8 (H) 1.7 - 8.2 K/UL    ABS. LYMPHOCYTES 2.4 0.5 - 4.6 K/UL    ABS. MONOCYTES 1.2 0.1 - 1.3 K/UL    ABS. EOSINOPHILS 0.1 0.0 - 0.8 K/UL    ABS. BASOPHILS 0.1 0.0 - 0.2 K/UL    ABS. IMM.  GRANS. 0.1 0.0 - 0.5 K/UL   METABOLIC PANEL, COMPREHENSIVE    Collection Time: 01/12/22 2:29 AM   Result Value Ref Range    Sodium 137 136 - 145 mmol/L    Potassium 4.1 3.5 - 5.1 mmol/L    Chloride 102 98 - 107 mmol/L    CO2 27 21 - 32 mmol/L    Anion gap 8 7 - 16 mmol/L    Glucose 150 (H) 65 - 100 mg/dL    BUN 17 8 - 23 MG/DL    Creatinine 0.60 0.6 - 1.0 MG/DL    GFR est AA >60 >60 ml/min/1.73m2    GFR est non-AA >60 >60 ml/min/1.73m2    Calcium 8.7 8.3 - 10.4 MG/DL    Bilirubin, total 0.8 0.2 - 1.1 MG/DL    ALT (SGPT) 12 12 - 65 U/L    AST (SGOT) 8 (L) 15 - 37 U/L    Alk. phosphatase 63 50 - 136 U/L    Protein, total 6.9 6.3 - 8.2 g/dL    Albumin 2.9 (L) 3.2 - 4.6 g/dL    Globulin 4.0 (H) 2.3 - 3.5 g/dL    A-G Ratio 0.7 (L) 1.2 - 3.5     URINALYSIS W/ RFLX MICROSCOPIC    Collection Time: 01/12/22  2:29 AM   Result Value Ref Range    Color YELLOW      Appearance CLEAR      Specific gravity 1.035 (H) 1.001 - 1.023      pH (UA) 5.0 5.0 - 9.0      Protein Negative NEG mg/dL    Glucose >1,000 mg/dL    Ketone TRACE (A) NEG mg/dL    Bilirubin Negative NEG      Blood Negative NEG      Urobilinogen 1.0 0.2 - 1.0 EU/dL    Nitrites Positive (A) NEG      Leukocyte Esterase SMALL (A) NEG      WBC 20-50 0 /hpf    RBC 0-3 0 /hpf    Epithelial cells 0 0 /hpf    Bacteria 4+ (H) 0 /hpf    Casts 0 0 /lpf   LACTIC ACID    Collection Time: 01/12/22  2:29 AM   Result Value Ref Range    Lactic acid 1.0 0.4 - 2.0 MMOL/L   GLUCOSE, POC    Collection Time: 01/12/22 12:45 PM   Result Value Ref Range    Glucose (POC) 107 (H) 65 - 100 mg/dL    Performed by Etienne Falcon        All Micro Results     Procedure Component Value Units Date/Time    CULTURE, URINE [[id number]] Collected: 01/12/22 0227    Order Status: Completed Specimen: Urine from Clean catch Updated: 01/12/22 0657     Special Requests: NO SPECIAL REQUESTS        Culture result:       NO GROWTH AFTER SHORT PERIOD OF INCUBATION. FURTHER RESULTS TO FOLLOW AFTER OVERNIGHT INCUBATION.           CULTURE, BLOOD [[id number]] Collected: 01/12/22 0229    Order Status: Completed Specimen: Blood Updated: 01/12/22 0323    COVID-19 RAPID TEST [[id number]] Collected: 01/12/22 0227    Order Status: Completed Specimen: Nasopharyngeal Updated: 01/12/22 0320     Specimen source NASAL        COVID-19 rapid test Not detected        Comment:      The specimen is NEGATIVE for SARS-CoV-2, the novel coronavirus associated with COVID-19. A negative result does not rule out COVID-19. This test has been authorized by the FDA under an Emergency Use Authorization (EUA) for use by authorized laboratories. Fact sheet for Healthcare Providers: mParticle.nz  Fact sheet for Patients: mParticle.nz       Methodology: Isothermal Nucleic Acid Amplification         CULTURE, BLOOD [[id number]]     Order Status: Sent Specimen: Blood           Other Studies:  XR SHOULDER LT AP/LAT MIN 2 V    Result Date: 1/12/2022  EXAM: Left shoulder x-rays. INDICATION: Pain. COMPARISON: None. TECHNIQUE: 3 views. FINDINGS: There is a comminuted and minimally displaced fracture involving the humeral head and neck. No other fractures are identified. Glenohumeral and acromioclavicular joint alignment are anatomic. Humeral head and neck fracture. XR HIP RT W OR WO PELV 2-3 VWS    Result Date: 1/11/2022  EXAM: Pelvis and right hip x-rays. INDICATION: Pain after falling. COMPARISON: None. TECHNIQUE: Frontal pelvis and two views right hip. FINDINGS: No acute fracture or dislocation. The pelvic ring is intact. No significant degenerative changes. No acute process. CT SHOULDER LT WO CONT    Result Date: 1/12/2022  Exam: CT SHOULDER LT WO CONT on 1/12/2022 10:30 AM Clinical History: The Female patient is 57years old  presenting for proximal humerus. Technique: Thin section CT images through the left proximal humerus and shoulder were obtained.   To optimally assess the anatomic relationships of the bones to one another, as well as to optimally assess the individual osseous structures, multiplanar reformatted images were also available for review. All CT scans at this facility are performed using dose reduction/dose modulation techniques, as appropriate the performed exam, including the following: Automated Exposure Control; Adjustment of the mA and/or kV according to patient size (this includes techniques or standardized protocols for targeted exams where dose is matched to indication/reason for exam); and Use of Iterative Reconstruction Technique. Radiation Exposure Indices: Reference Air Kerma (Ka,r) = 556 mGy-cm Comparison:  Left shoulder films 1/12/2022 0201 hours. FINDINGS: Multiplanar imaging of unchanged left shoulder proximal humerus demonstrates a comminuted and slightly impacted humeral neck fracture with avulsion of the greater humeral tuberosity. Fracture fragments extend into the articular surface of the humeral head. There is otherwise a small associated joint effusion. 1. Slightly impacted and comminuted left humeral neck and head fracture. XR CHEST PORT    Result Date: 1/11/2022  EXAM: Chest x-ray. INDICATION: Dyspnea. COMPARISON: Prior chest x-ray on March 23, 2020. TECHNIQUE: Single frontal view. FINDINGS: There is mild left lower lobe atelectasis or infiltrate, new from prior. The right lung is clear. The heart size is within normal limits for the portable technique. No pneumothorax, vascular congestion or pleural effusion is seen. Degenerative changes in the spine. Mild left lower lobe atelectasis or pneumonia.       Current Meds:  Current Facility-Administered Medications   Medication Dose Route Frequency    sodium chloride (NS) flush 5-40 mL  5-40 mL IntraVENous Q8H    sodium chloride (NS) flush 5-40 mL  5-40 mL IntraVENous PRN    acetaminophen (TYLENOL) tablet 650 mg  650 mg Oral Q6H PRN    Or    acetaminophen (TYLENOL) suppository 650 mg  650 mg Rectal Q6H PRN    polyethylene glycol (MIRALAX) packet 17 g  17 g Oral DAILY PRN  ondansetron (ZOFRAN ODT) tablet 4 mg  4 mg Oral Q8H PRN    Or    ondansetron (ZOFRAN) injection 4 mg  4 mg IntraVENous Q6H PRN    enoxaparin (LOVENOX) injection 40 mg  40 mg SubCUTAneous DAILY    magnesium hydroxide (MILK OF MAGNESIA) 400 mg/5 mL oral suspension 30 mL  30 mL Oral DAILY PRN    alum-mag hydroxide-simeth (MYLANTA) oral suspension 15 mL  15 mL Oral Q6H PRN    [START ON 1/13/2022] cefTRIAXone (ROCEPHIN) 1 g in 0.9% sodium chloride (MBP/ADV) 50 mL MBP  1 g IntraVENous Q24H    [START ON 1/13/2022] azithromycin (ZITHROMAX) 500 mg in 0.9% sodium chloride 250 mL (VIAL-MATE)  500 mg IntraVENous Q24H    insulin lispro (HUMALOG) injection 0-10 Units  0-10 Units SubCUTAneous AC&HS    amitriptyline (ELAVIL) tablet 25 mg  25 mg Oral QHS PRN    atorvastatin (LIPITOR) tablet 20 mg  20 mg Oral DAILY    aspirin delayed-release tablet 81 mg  81 mg Oral DAILY    budesonide-formoterol (SYMBICORT) 80-4.5 mcg inhaler  2 Puff Inhalation BID RT    [START ON 1/13/2022] pantoprazole (PROTONIX) tablet 40 mg  40 mg Oral ACB    sertraline (ZOLOFT) tablet 100 mg  100 mg Oral DAILY    HYDROcodone-acetaminophen (NORCO) 5-325 mg per tablet 1 Tablet  1 Tablet Oral Q8H PRN    tuberculin injection 5 Units  5 Units IntraDERMal ONCE       Signed:  Chau Leroy MD    Part of this note may have been written by using a voice dictation software. The note has been proof read but may still contain some grammatical/other typographical errors.

## 2022-01-13 PROBLEM — E87.20 METABOLIC ACIDOSIS: Status: ACTIVE | Noted: 2022-01-01

## 2022-01-13 NOTE — PROGRESS NOTES
There is a high probability of acute organ impairment or life-threatening deterioration in the patient's condition from: sepsis, metabolic acidosis  Critical care interventions: BIPAP, ICU, bicarb drip  Total critical care time spent: 45  minutes. Time is indicative of direct patient attendance at bedside and on the patient's floor nearby. Includes time spent at bedside performing history and exam, performing chart review, discussing findings and treatment plan with patient and/or family, discussing patient with consultants and colleagues, ordering and reviewing pertinent laboratory and radiographic evaluations, and discussing patient with nursing staff. Time excludes procedures.     Nixon Villalta MD

## 2022-01-13 NOTE — PROGRESS NOTES
Problem: Falls - Risk of  Goal: *Absence of Falls  Description: Document Ronda Nap Fall Risk and appropriate interventions in the flowsheet.   Outcome: Progressing Towards Goal  Note: Fall Risk Interventions:  Mobility Interventions: Assess mobility with egress test,Bed/chair exit alarm,Patient to call before getting OOB         Medication Interventions: Assess postural VS orthostatic hypotension,Bed/chair exit alarm,Patient to call before getting OOB    Elimination Interventions: Bed/chair exit alarm,Call light in reach,Patient to call for help with toileting needs    History of Falls Interventions: Bed/chair exit alarm,Consult care management for discharge planning,Investigate reason for fall         Problem: Patient Education: Go to Patient Education Activity  Goal: Patient/Family Education  Outcome: Progressing Towards Goal

## 2022-01-13 NOTE — PROGRESS NOTES
VANCO DAILY FOLLOW UP NOTE  [address] [facility] Pharmacokinetic Monitoring Service - Vancomycin    Consulting Provider: Dr. Beata Shah   Indication: sepsis of unknown etiology  Target Concentration: Goal AUC/JAMIL 400-600 mg*hr/L  Day of Therapy: 1  Additional Antimicrobials: azithromycin, pip/tazo    Pertinent Laboratory Values: Wt Readings from Last 1 Encounters:   01/11/22 99.8 kg (220 lb)     Temp Readings from Last 1 Encounters:   01/13/22 98 °F (36.7 °C)     No components found for: PROCAL  Recent Labs     01/13/22  0713 01/12/22  0229   BUN 20 17   CREA 0.63 0.60   WBC 17.1* 14.6*   LAC  --  1.0     Estimated Creatinine Clearance: 92.1 mL/min (by C-G formula based on SCr of 0.63 mg/dL). No results found for: Sonali Vallejo    MRSA Nasal Swab: N/A. Non-respiratory infection. .    Assessment:  Date/Time Dose Concentration AUC         Note: Serum concentrations collected for AUC dosing may appear elevated if collected in close proximity to the dose administered, this is not necessarily an indication of toxicity    Plan:  Dosing recommendations based on Bayesian software  Start vancomycin 2000 mg x 1 followed by 1250 mg q18h  Anticipated AUC of 433 and trough concentration of 10.6 at steady state  Renal labs as indicated   Vancomycin concentration ordered as clinically indicated  Pharmacy will continue to monitor patient and adjust therapy as indicated    Thank you for the consult,  JAZMIN Oneal

## 2022-01-13 NOTE — PROGRESS NOTES
TRANSFER - IN REPORT:    Verbal report received from BODØ RN(name) on Mayte Galdamez  being received from 9th floor(unit) for urgent transfer      Report consisted of patients Situation, Background, Assessment and   Recommendations(SBAR). Information from the following report(s) SBAR, OR Summary, Procedure Summary, Intake/Output, MAR, Recent Results and Cardiac Rhythm SR was reviewed with the receiving nurse. Opportunity for questions and clarification was provided. Assessment completed upon patients arrival to unit and care assumed.

## 2022-01-13 NOTE — PROGRESS NOTES
Moving to ICU, spoke with Dr. Jayshree Lopez, ordered CTA chest but has prior anaphylaxis with IV contrast and no prior contrast here so unable to premedicate , can do V/Q once stable  Theta Alpers, MD

## 2022-01-13 NOTE — PROGRESS NOTES
Hospitalist Progress Note   Admit Date:  2022 10:04 PM   Name:  Damaris Jason   Age:  57 y.o. Sex:  female  :  1960   MRN:  [id number]   Room:  [place] [address]    Presenting Complaint: Fall    Reason(s) for Admission: Pneumonia [J18.9]     Hospital Course & Interval History:     Ms. Leighton Méndez is a 56 yo female with PMH of asthma, DM2, CVA, obesity, neuropathy, JESSICA on CPAP admitted s/p fall with recent left humeral fracture that occurred while on [employer]. Not seen by ortho to date. Records were Evaluated per ortho this admit and recommends CT left shoulder and outpatient followup with Dr. Chava Adam. Found hypoxic and meets sepsis criteria due to fever / leukocytosis. Has CXR showing LLL pneumonia and UTI. COVID negative. She is on course of antibiotics. Discharge plans pending. Subjective (22):     Called with bicarb of 6 and increased respirations, complaints of shortness of breath and back pain, thirsty, ROS limited due to urgent issues         Assessment & Plan:         Acute metabolic acidosis- new on 22   · Bicarb 6 on BMP  · STAT ABG and BMP, lactate ordered  · 2 amps bicarb ordered  · Repeat ABG in 2 hours   · O2 ok on 4 L NC  · STAT CXR reordered  · CTA chest when more stable  · Expand antibiotics to vancomycin and zosyn  · May need ICU transfer pending course, ICU [place] RN aware of case and will assist monitoring         Principal Problem:    Acute respiratory failure with hypoxia ([address] (2022)  Active Problems:    Pneumonia (2022)    Sepsis:  · D2 antibiotics, change to vancomycin/ zosyn  · Wean O2 as tolerant, on 4 L NC   · followup cultures             UTI (urinary tract infection) (2022)  · D2 antibiotics  · followup cultures             Obesity (2022)         Closed fracture of surgical neck of humerus (2022)  ·   Outpatient ortho  · Supportive care   · PT,OT      Prior CVA:  · Continued asa, lipitor      DM2:  · SSI        Dispo/Discharge Planning:      Pending     Diet:  ADULT DIET Regular; 4 carb choices (60 gm/meal)  DVT PPx: lovenox  Code status: Full Code    Hospital Problems as of 1/13/2022 Never Reviewed          Codes Class Noted - Resolved POA    Pneumonia ICD-10-CM: J18.9  ICD-9-CM: 275  1/12/2022 - Present Unknown        UTI (urinary tract infection) ICD-10-CM: N39.0  ICD-9-CM: 599.0  1/12/2022 - Present Unknown        Hypoxia ICD-10-CM: R09.02  ICD-9-CM: 799.02  1/12/2022 - Present Unknown        Obesity (Chronic) ICD-10-CM: E66.9  ICD-9-CM: 278.00  1/12/2022 - Present Unknown        Closed fracture of surgical neck of humerus ICD-10-CM: S42.213A  ICD-9-CM: 812.01  1/12/2022 - Present Unknown        * (Principal) Acute respiratory failure with hypoxia [facility]) ICD-10-CM: J96.01  ICD-9-CM: 518.81  1/12/2022 - Present Yes        Sepsis ([address] ICD-10-CM: A41.9  ICD-9-CM: 038.9, 995.91  1/12/2022 - Present Yes              Objective:     Patient Vitals for the past 24 hrs:   Temp Pulse Resp BP SpO2   01/13/22 0750 98 °F (36.7 °C) (!) 108 20 (!) 150/67 99 %   01/13/22 0315 97.9 °F (36.6 °C) 98 17 130/77 95 %   01/13/22 0041 98.7 °F (37.1 °C) 98 18 128/89 97 %   01/12/22 1935 98.3 °F (36.8 °C) (!) 108 18 134/81 96 %   01/12/22 1453 98.8 °F (37.1 °C) (!) 103 20 (!) 144/74 93 %   01/12/22 1117 98.8 °F (37.1 °C) 95 20 130/76 94 %   01/12/22 1035    (!) 151/88 96 %   01/12/22 1000    135/76 96 %   01/12/22 0930    127/81 96 %   01/12/22 0900    111/81 96 %   01/12/22 0841    (!) 141/56 95 %   01/12/22 0837    (!) 144/116      Oxygen Therapy  O2 Sat (%): 99 % (01/13/22 0750)  Pulse via Oximetry: 93 beats per minute (01/12/22 1035)  O2 Device: Nasal cannula (01/13/22 0750)  O2 Flow Rate (L/min): 4 l/min (01/13/22 0750)    Estimated body mass index is 40.24 kg/m² as calculated from the following:    Height as of this encounter: 5' 2\" (1.575 m). Weight as of this encounter: 99.8 kg (220 lb).     Intake/Output Summary (Last 24 hours) at 1/13/2022 0830  Last data filed at 1/13/2022 0750  Gross per 24 hour   Intake 540 ml   Output 3200 ml   Net -2660 ml         Physical Exam:     Blood pressure (!) 150/67, pulse (!) 108, temperature 98 °F (36.7 °C), resp. rate 20, height 5' 2\" (1.575 m), weight 99.8 kg (220 lb), SpO2 99 %. General:    Well nourished. , elderly, increased respiratory rate with increased work of breathing   CV:   RRR. No m/r/g. No jugular venous distension. No edema   Lungs:     Coarse   Abdomen: Bowel sounds present. Soft, nontender, nondistended. Extremities: No cyanosis or clubbing. No edema  Skin:     No rashes and normal coloration. Warm and dry. Neuro:   grossly intact. Psych:  anxious    I have reviewed ordered lab tests and independently visualized imaging below:    Recent Labs:  Recent Results (from the past 48 hour(s))   EKG, 12 LEAD, INITIAL    Collection Time: 01/11/22 10:21 PM   Result Value Ref Range    Ventricular Rate 94 BPM    Atrial Rate 93 BPM    P-R Interval 177 ms    QRS Duration 92 ms    Q-T Interval 355 ms    QTC Calculation (Bezet) 444 ms    Calculated P Axis 58 degrees    Calculated R Axis 124 degrees    Calculated T Axis 64 degrees    Diagnosis       Sinus rhythm  Anterior infarct, old  Non-specific ST-t wave changes ST elevation consider inferior injury or acute   infarct    Confirmed by ARTHUR PETTY (), Jose Flores ([id number]) on 1/12/2022 3:04:07 PM     COVID-19 RAPID TEST    Collection Time: 01/12/22  2:27 AM   Result Value Ref Range    Specimen source NASAL      COVID-19 rapid test Not detected NOTD     CULTURE, URINE    Collection Time: 01/12/22  2:27 AM    Specimen: Clean catch; Urine   Result Value Ref Range    Special Requests: NO SPECIAL REQUESTS      Culture result:        NO GROWTH AFTER SHORT PERIOD OF INCUBATION. FURTHER RESULTS TO FOLLOW AFTER OVERNIGHT INCUBATION.    CBC WITH AUTOMATED DIFF    Collection Time: 01/12/22  2:29 AM   Result Value Ref Range    WBC 14.6 (H) 4.3 - 11.1 K/uL RBC 4.37 4.05 - 5.2 M/uL    HGB 12.0 11.7 - 15.4 g/dL    HCT 37.8 35.8 - 46.3 %    MCV 86.5 79.6 - 97.8 FL    MCH 27.5 26.1 - 32.9 PG    MCHC 31.7 31.4 - 35.0 g/dL    RDW 14.6 11.9 - 14.6 %    PLATELET 802 328 - 648 K/uL    MPV 8.7 (L) 9.4 - 12.3 FL    ABSOLUTE NRBC 0.00 0.0 - 0.2 K/uL    DF AUTOMATED      NEUTROPHILS 74 43 - 78 %    LYMPHOCYTES 16 13 - 44 %    MONOCYTES 8 4.0 - 12.0 %    EOSINOPHILS 1 0.5 - 7.8 %    BASOPHILS 0 0.0 - 2.0 %    IMMATURE GRANULOCYTES 1 0.0 - 5.0 %    ABS. NEUTROPHILS 10.8 (H) 1.7 - 8.2 K/UL    ABS. LYMPHOCYTES 2.4 0.5 - 4.6 K/UL    ABS. MONOCYTES 1.2 0.1 - 1.3 K/UL    ABS. EOSINOPHILS 0.1 0.0 - 0.8 K/UL    ABS. BASOPHILS 0.1 0.0 - 0.2 K/UL    ABS. IMM. GRANS. 0.1 0.0 - 0.5 K/UL   METABOLIC PANEL, COMPREHENSIVE    Collection Time: 01/12/22  2:29 AM   Result Value Ref Range    Sodium 137 136 - 145 mmol/L    Potassium 4.1 3.5 - 5.1 mmol/L    Chloride 102 98 - 107 mmol/L    CO2 27 21 - 32 mmol/L    Anion gap 8 7 - 16 mmol/L    Glucose 150 (H) 65 - 100 mg/dL    BUN 17 8 - 23 MG/DL    Creatinine 0.60 0.6 - 1.0 MG/DL    GFR est AA >60 >60 ml/min/1.73m2    GFR est non-AA >60 >60 ml/min/1.73m2    Calcium 8.7 8.3 - 10.4 MG/DL    Bilirubin, total 0.8 0.2 - 1.1 MG/DL    ALT (SGPT) 12 12 - 65 U/L    AST (SGOT) 8 (L) 15 - 37 U/L    Alk.  phosphatase 63 50 - 136 U/L    Protein, total 6.9 6.3 - 8.2 g/dL    Albumin 2.9 (L) 3.2 - 4.6 g/dL    Globulin 4.0 (H) 2.3 - 3.5 g/dL    A-G Ratio 0.7 (L) 1.2 - 3.5     URINALYSIS W/ RFLX MICROSCOPIC    Collection Time: 01/12/22  2:29 AM   Result Value Ref Range    Color YELLOW      Appearance CLEAR      Specific gravity 1.035 (H) 1.001 - 1.023      pH (UA) 5.0 5.0 - 9.0      Protein Negative NEG mg/dL    Glucose >1,000 mg/dL    Ketone TRACE (A) NEG mg/dL    Bilirubin Negative NEG      Blood Negative NEG      Urobilinogen 1.0 0.2 - 1.0 EU/dL    Nitrites Positive (A) NEG      Leukocyte Esterase SMALL (A) NEG      WBC 20-50 0 /hpf    RBC 0-3 0 /hpf    Epithelial cells 0 0 /hpf    Bacteria 4+ (H) 0 /hpf    Casts 0 0 /lpf   LACTIC ACID    Collection Time: 01/12/22  2:29 AM   Result Value Ref Range    Lactic acid 1.0 0.4 - 2.0 MMOL/L   GLUCOSE, POC    Collection Time: 01/12/22 12:45 PM   Result Value Ref Range    Glucose (POC) 107 (H) 65 - 100 mg/dL    Performed by [address] [place] [address], BLOOD    Collection Time: 01/12/22 12:49 PM    Specimen: Blood   Result Value Ref Range    Special Requests: RIGHT  FOREARM        Culture result: PENDING    GLUCOSE, POC    Collection Time: 01/12/22  4:32 PM   Result Value Ref Range    Glucose (POC) 140 (H) 65 - 100 mg/dL    Performed by Kim.     GLUCOSE, POC    Collection Time: 01/12/22  9:25 PM   Result Value Ref Range    Glucose (POC) 143 (H) 65 - 100 mg/dL    Performed by Breanna    METABOLIC PANEL, BASIC    Collection Time: 01/13/22  7:13 AM   Result Value Ref Range    Sodium 135 (L) 136 - 145 mmol/L    Potassium 5.1 3.5 - 5.1 mmol/L    Chloride 106 98 - 107 mmol/L    CO2 6 (LL) 21 - 32 mmol/L    Anion gap 23 (H) 7 - 16 mmol/L    Glucose 195 (H) 65 - 100 mg/dL    BUN 20 8 - 23 MG/DL    Creatinine 0.63 0.6 - 1.0 MG/DL    GFR est AA >60 >60 ml/min/1.73m2    GFR est non-AA >60 >60 ml/min/1.73m2    Calcium 9.5 8.3 - 10.4 MG/DL   MAGNESIUM    Collection Time: 01/13/22  7:13 AM   Result Value Ref Range    Magnesium 2.9 (H) 1.8 - 2.4 mg/dL   CBC W/O DIFF    Collection Time: 01/13/22  7:13 AM   Result Value Ref Range    WBC 17.1 (H) 4.3 - 11.1 K/uL    RBC 5.24 (H) 4.05 - 5.2 M/uL    HGB 14.0 11.7 - 15.4 g/dL    HCT 46.3 35.8 - 46.3 %    MCV 88.4 79.6 - 97.8 FL    MCH 26.7 26.1 - 32.9 PG    MCHC 30.2 (L) 31.4 - 35.0 g/dL    RDW 14.2 11.9 - 14.6 %    PLATELET 811 (H) 360 - 450 K/uL    MPV 9.1 (L) 9.4 - 12.3 FL    ABSOLUTE NRBC 0.00 0.0 - 0.2 K/uL   GLUCOSE, POC    Collection Time: 01/13/22  7:52 AM   Result Value Ref Range    Glucose (POC) 205 (H) 65 - 100 mg/dL    Performed by Francisco White        All Micro Results Procedure Component Value Units Date/Time    CULTURE, BLOOD [[id number]] Collected: 01/12/22 1249    Order Status: Completed Specimen: Blood Updated: 01/12/22 1450     Special Requests: --        RIGHT  FOREARM       Culture result: PENDING    CULTURE, URINE [[id number]] Collected: 01/12/22 0227    Order Status: Completed Specimen: Urine from Clean catch Updated: 01/12/22 0657     Special Requests: NO SPECIAL REQUESTS        Culture result:       NO GROWTH AFTER SHORT PERIOD OF INCUBATION. FURTHER RESULTS TO FOLLOW AFTER OVERNIGHT INCUBATION. CULTURE, BLOOD [[id number]] Collected: 01/12/22 0229    Order Status: Completed Specimen: Blood Updated: 01/12/22 0323    COVID-19 RAPID TEST [[id number]] Collected: 01/12/22 0227    Order Status: Completed Specimen: Nasopharyngeal Updated: 01/12/22 0320     Specimen source NASAL        COVID-19 rapid test Not detected        Comment:      The specimen is NEGATIVE for SARS-CoV-2, the novel coronavirus associated with COVID-19. A negative result does not rule out COVID-19. This test has been authorized by the FDA under an Emergency Use Authorization (EUA) for use by authorized laboratories. Fact sheet for Healthcare Providers: ConventionUpdate.co.nz  Fact sheet for Patients: ConventionUpdate.co.nz       Methodology: Isothermal Nucleic Acid Amplification               Other Studies:  CT SHOULDER LT WO CONT    Result Date: 1/12/2022  Exam: CT SHOULDER LT WO CONT on 1/12/2022 10:30 AM Clinical History: The Female patient is 57years old  presenting for proximal humerus. Technique: Thin section CT images through the left proximal humerus and shoulder were obtained. To optimally assess the anatomic relationships of the bones to one another, as well as to optimally assess the individual osseous structures, multiplanar reformatted images were also available for review.  All CT scans at this facility are performed using dose reduction/dose modulation techniques, as appropriate the performed exam, including the following: Automated Exposure Control; Adjustment of the mA and/or kV according to patient size (this includes techniques or standardized protocols for targeted exams where dose is matched to indication/reason for exam); and Use of Iterative Reconstruction Technique. Radiation Exposure Indices: Reference Air Kerma (Ka,r) = 556 mGy-cm Comparison:  Left shoulder films 1/12/2022 0201 hours. FINDINGS: Multiplanar imaging of unchanged left shoulder proximal humerus demonstrates a comminuted and slightly impacted humeral neck fracture with avulsion of the greater humeral tuberosity. Fracture fragments extend into the articular surface of the humeral head. There is otherwise a small associated joint effusion. 1. Slightly impacted and comminuted left humeral neck and head fracture.        Current Meds:  Current Facility-Administered Medications   Medication Dose Route Frequency    sodium bicarbonate (8.4%) injection 50 mEq  50 mEq IntraVENous ONCE    piperacillin-tazobactam (ZOSYN) 3.375 g in 0.9% sodium chloride (MBP/ADV) 100 mL MBP  3.375 g IntraVENous Q8H    sodium chloride (NS) flush 5-40 mL  5-40 mL IntraVENous Q8H    sodium chloride (NS) flush 5-40 mL  5-40 mL IntraVENous PRN    acetaminophen (TYLENOL) tablet 650 mg  650 mg Oral Q6H PRN    Or    acetaminophen (TYLENOL) suppository 650 mg  650 mg Rectal Q6H PRN    polyethylene glycol (MIRALAX) packet 17 g  17 g Oral DAILY PRN    ondansetron (ZOFRAN ODT) tablet 4 mg  4 mg Oral Q8H PRN    Or    ondansetron (ZOFRAN) injection 4 mg  4 mg IntraVENous Q6H PRN    enoxaparin (LOVENOX) injection 40 mg  40 mg SubCUTAneous DAILY    magnesium hydroxide (MILK OF MAGNESIA) 400 mg/5 mL oral suspension 30 mL  30 mL Oral DAILY PRN    alum-mag hydroxide-simeth (MYLANTA) oral suspension 15 mL  15 mL Oral Q6H PRN    azithromycin (ZITHROMAX) 500 mg in 0.9% sodium chloride 250 mL (VIAL-MATE)  500 mg IntraVENous Q24H    insulin lispro (HUMALOG) injection 0-10 Units  0-10 Units SubCUTAneous AC&HS    amitriptyline (ELAVIL) tablet 25 mg  25 mg Oral QHS PRN    atorvastatin (LIPITOR) tablet 20 mg  20 mg Oral DAILY    aspirin delayed-release tablet 81 mg  81 mg Oral DAILY    budesonide-formoterol (SYMBICORT) 80-4.5 mcg inhaler  2 Puff Inhalation BID RT    pantoprazole (PROTONIX) tablet 40 mg  40 mg Oral ACB    sertraline (ZOLOFT) tablet 100 mg  100 mg Oral DAILY    HYDROcodone-acetaminophen (NORCO) 5-325 mg per tablet 1 Tablet  1 Tablet Oral Q8H PRN    tuberculin injection 5 Units  5 Units IntraDERMal ONCE    docusate sodium (COLACE) capsule 100 mg  100 mg Oral BID       Signed:  Isrrael Norton MD    Part of this note may have been written by using a voice dictation software. The note has been proof read but may still contain some grammatical/other typographical errors.

## 2022-01-13 NOTE — PROGRESS NOTES
Pt arrived from 9th floor. Disoriented x4, only garbles words. Severe excoriation under abdominal folds and on BLE. Lesion on L knee. Sacrum dark and unblanchable, pad placed. Only IV access leaking and minimal flush. Dr. Jayshree Lopez to place central line.

## 2022-01-13 NOTE — PROGRESS NOTES
Critical Care Outreach Nurse Progress Report:    Subjective: In to assess pt secondary to nurse/provider concern - metabolic acidosis with increased WOB. MEWS Score: 2 (01/13/22 0750)    Vitals:    01/12/22 1935 01/13/22 0041 01/13/22 0315 01/13/22 0750   BP: 134/81 128/89 130/77 (!) 150/67   Pulse: (!) 108 98 98 (!) 108   Resp: 18 18 17 20   Temp: 98.3 °F (36.8 °C) 98.7 °F (37.1 °C) 97.9 °F (36.6 °C) 98 °F (36.7 °C)   SpO2: 96% 97% 95% 99%   Weight:       Height:            LAB DATA:    Recent Labs     01/13/22 0713 01/12/22 0229   * 137   K 5.1 4.1    102   CO2 6* 27   AGAP 23* 8   * 150*   BUN 20 17   CREA 0.63 0.60   GFRAA >60 >60   GFRNA >60 >60   CA 9.5 8.7   MG 2.9*  --    ALB  --  2.9*   TP  --  6.9   GLOB  --  4.0*   AGRAT  --  0.7*   ALT  --  12        Recent Labs     01/13/22 0713 01/12/22 0229   WBC 17.1* 14.6*   HGB 14.0 12.0   HCT 46.3 37.8   * 347        Objective:     Pain Intensity 1: 0 (01/13/22 0750)  Pain Location 1: Shoulder  Pain Intervention(s) 1: Repositioned  Patient Stated Pain Goal: 0    Assessment: Patient alert. Difficulty speaking due to work of breathing. SpO2 97% on 4L NC. Repeat labs pending. Patient given 2 amps of Sodium Bicarb as ordered. Respirations appear less labored 10 minutes after bicarb. RT setting up BiPAP for now to assist with WOB. VS, labs, and progress notes reviewed and patient discussed with Dr. Carlos Bang. Plan: Will follow up after labs result and continue to follow per outreach protocol.

## 2022-01-13 NOTE — PROCEDURES
The pt. Was placed in the  Supine position  The left neck and upper chest were prepped and drapped    Time out was done  1 % lidocaine was injected  An attempt was made to access the left internal jugular but there was poor visualization of the ij due to tachypnea. the left subclavian  Was then accessed with ultrasound  Using 18 gauge needle. A wire guide was placed then a quad lumen catheter was placed using Seldinger technique.   Good blood return

## 2022-01-13 NOTE — PROGRESS NOTES
Chart reviewed. Pt being transferred to CCU as pt currently on BiPAP. Unable to complete assessment with pt as she is SOB on BiPAP. Contacted spouse, Blanco Myles, and verified demographic information. Updated address to [place] [id number], [address] [place] [address]. PCP verified as Dr. Seferino Arriola and pt was last seen by PCP 12/1/2021. PCP updated in chart. Pt lives in 1 story apartment with spouse, son, and granddaughter, no steps to enter into the home. Pt normally independent with ADLs but occasionally needed assistance with getting into car is neuropathy was \"acting up\". Pt does not drive as she has problems with vision from retinopathy. Pt has DMEs such as walker, w/c, glucometer, CPAP (with [employer]), and electric scooter. Pt does not use home oxygen. Pt family able to transport home, to doctor apt,  medications, and get groceries. Pt primary pharmacy is [facility] in [place] locally or [facility] for routine mail in. Pt able to afford medications. Spouse and son provide 24 hour care for pt as needed. 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 Spouse would like pt to return home but open to other options if needed. Pt has used HH but spouse does not recall name and pt has never used SNF. Pt was on a cruise 1/4/22 and fall on cruise resulted in humerus fx. Spouse aware ortho wants outpatient follow up after acute concerns improve. PT is recommending STR but this will need to be readdressed after pt improves. CM to continue to follow and monitor for any needs that may occur. Care Management Interventions  PCP Verified by CM: Yes (Dr. Seferino Arriola)  Mode of Transport at Discharge:  Other (see comment) (family )  Transition of Care Consult (CM Consult): Discharge Planning  Discharge Durable Medical Equipment: No  Physical Therapy Consult: Yes  Occupational Therapy Consult: No  Speech Therapy Consult: No  Support Systems: Spouse/Significant Other,Child(chava)  Confirm Follow Up Transport: Family  The Plan for Transition of Care is Related to the Following Treatment Goals : Return to baseline  The Patient and/or Patient Representative was Provided with a Choice of Provider and Agrees with the Discharge Plan?: Yes  Name of the Patient Representative Who was Provided with a Choice of Provider and Agrees with the Discharge Plan: spouse  Freedom of Choice List was Provided with Basic Dialogue that Supports the Patient's Individualized Plan of Care/Goals, Treatment Preferences and Shares the Quality Data Associated with the Providers?: Yes  [place] Resource Information Provided?: Yes  Discharge Location  Discharge Placement: Unable to determine at this time

## 2022-01-13 NOTE — PROGRESS NOTES
END OF SHIFT NOTE:    Intake/Output  01/12 1901 - 01/13 0700  In: 300 [I.V.:300]  Out: 2200 [Urine:2200]   Voiding: YES  Catheter: YES  Drain:   External Urinary Catheter 01/12/22 (Active)   Site Assessment Clean, dry, & intact 01/13/22 0315   Repositioned No 01/13/22 0315   Perineal Care No 01/13/22 0315   Wick Changed No 01/13/22 0315   Suction Canister/Tubing Changed No 01/13/22 0315   Urine Output (mL) 500 ml 01/13/22 0518               Stool:  0 occurrences. Emesis:  0 occurrences. VITAL SIGNS  Patient Vitals for the past 12 hrs:   Temp Pulse Resp BP SpO2   01/13/22 0315 97.9 °F (36.6 °C) 98 17 130/77 95 %   01/13/22 0041 98.7 °F (37.1 °C) 98 18 128/89 97 %   01/12/22 1935 98.3 °F (36.8 °C) (!) 108 18 134/81 96 %       Pain Assessment  Pain 1  Pain Scale 1: Visual (01/13/22 0315)  Pain Intensity 1: 0 (01/13/22 0315)  Patient Stated Pain Goal: 0 (01/13/22 0315)  Pain Reassessment 1: Yes (01/12/22 1935)  Pain Location 1: Shoulder (01/12/22 1315)  Pain Orientation 1: Left (01/12/22 1315)  Pain Description 1: Aching (01/12/22 1257)  Pain Intervention(s) 1: Repositioned (01/12/22 1315)    Ambulating  No    Additional Information:   Norco x1  Elavil x1  Shift report given to oncoming nurse at the bedside.     Marlee Lopez RN

## 2022-01-13 NOTE — PROGRESS NOTES
TRANSFER - OUT REPORT:    Verbal report given to JANE Armijo(name) on [facility]  being transferred to CCU(unit) for change in patient condition(HIGHER LEVELOF CARE)       Report consisted of patients Situation, Background, Assessment and   Recommendations(SBAR). Information from the following report(s) SBAR, Kardex, Intake/Output and MAR was reviewed with the receiving nurse. Lines:   Peripheral IV 01/12/22 Right Antecubital (Active)   Site Assessment Clean, dry, & intact 01/13/22 0750   Phlebitis Assessment 0 01/13/22 0750   Infiltration Assessment 0 01/13/22 0750   Dressing Status Clean, dry, & intact 01/13/22 0750   Dressing Type Transparent;Tape 01/13/22 0750   Hub Color/Line Status Pink;Flushed; Infusing 01/13/22 0750   Action Taken Dressing changed;Dressing reinforced 01/13/22 0750   Alcohol Cap Used No 01/13/22 0315        Opportunity for questions and clarification was provided.       Patient transported with:   [employer]

## 2022-01-13 NOTE — CONSULTS
CHRISTIANA/ [address] ENCOUNTER :  2022    Date of Admission:  2022  Length of Stay: 1 days     The patient's chart has been reviewed and the chart has been discussed with nursing staff. Subjective: This patient has been seen and evaluated at the request of Dr. Genie Severance for severe metabolic acidosis. Patient is a 57 y.o. female presented after a falling right hip. She fell over a week ago and injuring her shoulder. She has been on norco. XR hip without acute process. She has a left humeral fracture that occurred while on [employer]. Not seen by ortho to date until here. Records were evaluated per ortho this admit and recommends CT left shoulder and outpatient followup with Dr. Jayne Palmer. She has underlying asthma, DM2, CVA, obesity, neuropathy, and  JESSICA on CPAP. She was hypoxic, place don 2 lpm. CXR with atelectasis/infiltrates. + UTI and abx initiated. Today, she was found to be severely acidotic, metabolic. Bicarb ordered, plan to repeat in 2 hours. She was placed on BiPAP and transfer orders placed for ICU. We were asked to assist with care while acutely ill. No nausea/ vomiting, LFTS WNL, and glucose 143-205. Past Surgical History:   Procedure Laterality Date    HX BREAST BIOPSY      HX  SECTION      HX REFRACTIVE SURGERY        Social History     Tobacco Use    Smoking status: Passive Smoke Exposure - Never Smoker    Smokeless tobacco: Never Used   Substance Use Topics    Alcohol use: Never      Family History   Problem Relation Age of Onset    Cancer Mother     COPD Father       Allergies   Allergen Reactions    Other Food Swelling     Shell fish    Contrast Agent [Iodine] Anaphylaxis    Banana Itching and Swelling     Mouth itches and swells      Prior to Admission Medications   Prescriptions Last Dose Informant Patient Reported? Taking? amLODIPine (Norvasc) 5 mg tablet   Yes Yes   Sig: Take 5 mg by mouth daily.    amitriptyline (ELAVIL) 75 mg tablet   Yes Yes   Sig: Take 25 mg by mouth nightly as needed for Sleep. aspirin delayed-release 81 mg tablet   Yes Yes   Sig: Take 81 mg by mouth daily. atorvastatin (Lipitor) 20 mg tablet   Yes Yes   Sig: Take 20 mg by mouth daily. empagliflozin (JARDIANCE) 10 mg tablet   Yes Yes   Sig: Take 10 mg by mouth daily. fluticasone propion-salmeteroL (Advair HFA) 115-21 mcg/actuation inhaler   Yes Yes   Sig: Take 2 Puffs by inhalation two (2) times a day. fluticasone propionate (Flonase Allergy Relief) 50 mcg/actuation nasal spray   Yes Yes   Si Sprays by Both Nostrils route daily. lisinopriL (PRINIVIL, ZESTRIL) 20 mg tablet   Yes Yes   Sig: Take 20 mg by mouth daily. meclizine (ANTIVERT) 25 mg tablet   No No   Sig: Take 1 Tab by mouth three (3) times daily as needed for Dizziness or Nausea for up to 20 doses. montelukast (Singulair) 10 mg tablet   Yes Yes   Sig: Take 10 mg by mouth daily. omeprazole (PRILOSEC) 20 mg capsule   Yes Yes   Sig: Take 20 mg by mouth daily. ondansetron (ZOFRAN ODT) 4 mg disintegrating tablet   No No   Sig: Take 1 Tab by mouth every eight (8) hours as needed for Nausea. pregabalin (Lyrica) 300 mg capsule   Yes Yes   Sig: Take 300 mg by mouth two (2) times a day. sertraline (Zoloft) 100 mg tablet   Yes Yes   Sig: Take 100 mg by mouth daily.       Facility-Administered Medications: None       MEDS SCHEDULED:    Current Facility-Administered Medications   Medication Dose Route Frequency    piperacillin-tazobactam (ZOSYN) 3.375 g in 0.9% sodium chloride (MBP/ADV) 100 mL MBP  3.375 g IntraVENous Q8H    albuterol (PROVENTIL VENTOLIN) nebulizer solution 2.5 mg  2.5 mg Nebulization Q4H PRN    vancomycin (VANCOCIN) 2000 mg in  ml infusion  2,000 mg IntraVENous ONCE    sodium bicarbonate (8.4%) 150 mEq in dextrose 5% 1,000 mL infusion   IntraVENous CONTINUOUS    [START ON 2022] vancomycin (VANCOCIN) 1250 mg in  ml infusion  1,250 mg IntraVENous Q18H    sodium chloride (NS) flush 5-40 mL  5-40 mL IntraVENous Q8H    sodium chloride (NS) flush 5-40 mL  5-40 mL IntraVENous PRN    acetaminophen (TYLENOL) tablet 650 mg  650 mg Oral Q6H PRN    Or    acetaminophen (TYLENOL) suppository 650 mg  650 mg Rectal Q6H PRN    polyethylene glycol (MIRALAX) packet 17 g  17 g Oral DAILY PRN    ondansetron (ZOFRAN ODT) tablet 4 mg  4 mg Oral Q8H PRN    Or    ondansetron (ZOFRAN) injection 4 mg  4 mg IntraVENous Q6H PRN    enoxaparin (LOVENOX) injection 40 mg  40 mg SubCUTAneous DAILY    magnesium hydroxide (MILK OF MAGNESIA) 400 mg/5 mL oral suspension 30 mL  30 mL Oral DAILY PRN    alum-mag hydroxide-simeth (MYLANTA) oral suspension 15 mL  15 mL Oral Q6H PRN    azithromycin (ZITHROMAX) 500 mg in 0.9% sodium chloride 250 mL (VIAL-MATE)  500 mg IntraVENous Q24H    insulin lispro (HUMALOG) injection 0-10 Units  0-10 Units SubCUTAneous AC&HS    amitriptyline (ELAVIL) tablet 25 mg  25 mg Oral QHS PRN    atorvastatin (LIPITOR) tablet 20 mg  20 mg Oral DAILY    aspirin delayed-release tablet 81 mg  81 mg Oral DAILY    budesonide-formoterol (SYMBICORT) 80-4.5 mcg inhaler  2 Puff Inhalation BID RT    pantoprazole (PROTONIX) tablet 40 mg  40 mg Oral ACB    sertraline (ZOLOFT) tablet 100 mg  100 mg Oral DAILY    HYDROcodone-acetaminophen (NORCO) 5-325 mg per tablet 1 Tablet  1 Tablet Oral Q8H PRN    tuberculin injection 5 Units  5 Units IntraDERMal ONCE    docusate sodium (COLACE) capsule 100 mg  100 mg Oral BID         Review of Systems  Review of systems not obtained due to patient factors.  unable acutely ill appearing     Objective:     Vitals:    01/12/22 1935 01/13/22 0041 01/13/22 0315 01/13/22 0750   BP: 134/81 128/89 130/77 (!) 150/67   Pulse: (!) 108 98 98 (!) 108   Resp: 18 18 17 20   Temp: 98.3 °F (36.8 °C) 98.7 °F (37.1 °C) 97.9 °F (36.6 °C) 98 °F (36.7 °C)   SpO2: 96% 97% 95% 99%   Weight:       Height:         01/13 0701 - 01/13 1900  In: -   Out: 850 [Urine:850]  01/11 1901 - 01/13 0700  In: 0667 [P.O.:240; I.V.:1600]  Out: 2350 [Urine:2350]  PHYSICAL EXAM     Physical Exam:   General:  Alert, obese, on CPAP    Eyes:  Conjunctivae/corneas clear. Nose: Nares patent and moist.    Mouth/Throat: Lips, mucosa, and tongue pink and intact. Neck: Supple, symmetrical.   Respiratory:   Decreased bases,  to auscultation bilaterally on 3 lpm   Cardiovascular:  Regular rate and rhythm, S1, S2, no murmur, click, rub or gallop. GI:   Abdomen soft, non-tender. Bowel sounds active X 4 Q. Musculoskeletal: Extremities symmetrical, atraumatic, no cyanosis, no edema. Skin: Skin color, texture, turgor normal. No rashes or lesions       Neurologic:  Alert and oriented. ACTIVITY:  Limited   NUTRITION: NPO    IMAGES: Results from Hospital Encounter encounter on 01/11/22    CT SHOULDER LT WO CONT    Impression  1. Slightly impacted and comminuted left humeral neck and head fracture. Results from [place] encounter on 03/23/20    MRI BRAIN WO CONT    Impression  Impression:  Mild age-related senescent changes without acute intracranial abnormality. CPT code(s) R2151785       No results found for this or any previous visit.        LAB  Recent Labs     01/13/22  0713 01/12/22 0229   WBC 17.1* 14.6*   HGB 14.0 12.0   HCT 46.3 37.8   * 347     Recent Labs     01/13/22  0713 01/12/22 0229   * 137   K 5.1 4.1    102   CO2 6* 27   * 150*   BUN 20 17   CREA 0.63 0.60   MG 2.9*  --      Recent Labs     01/12/22 0229   LAC 1.0     ABG:     Recent Labs     01/13/22  0830   PHI 7.11*   PCO2I 13.0*   PO2I 112*   HCO3I 4.1*     Cultures:   Recent Labs     01/12/22  1249 01/12/22 0229 01/12/22 0227   CULT NO GROWTH AFTER 19 HOURS NO GROWTH 1 DAY >100,000 COLONIES/mL GRAM NEGATIVE RODS SUBCULTURE IN PROGRESS*  50,000-100,000 COLONIES/mL NORMAL SKIN SNOW ISOLATED       Inpat Anti-Infectives (From admission, onward)     Start     Ordered Stop 01/14/22 0300  vancomycin (VANCOCIN) 1250 mg in  ml infusion  1,250 mg,   IntraVENous,   EVERY 18 HOURS         01/13/22 0954 --    01/13/22 0900  piperacillin-tazobactam (ZOSYN) 3.375 g in 0.9% sodium chloride (MBP/ADV) 100 mL MBP  3.375 g,   IntraVENous,   EVERY 8 HOURS         01/13/22 0822 --    01/13/22 0900  vancomycin (VANCOCIN) 2000 mg in  ml infusion  2,000 mg,   IntraVENous,   ONCE         01/13/22 0840 01/13/22 2059 01/13/22 0400  azithromycin (ZITHROMAX) 500 mg in 0.9% sodium chloride 250 mL (VIAL-MATE)  500 mg,   IntraVENous,   EVERY 24 HOURS         01/12/22 0755 01/18/22 0359                Assessment/Plan:       Sepsis ([facility] [address] (1/12/2022)- blood pressure stable. Pneumonia (1/12/2022)  On broad spectrum abx,       UTI (urinary tract infection) (1/12/2022)  On abx, GNR. Hypoxia (1/12/2022)  On 2 lpm with sat of 99%      Obesity (1/74/1375)  Chronic, complicates care      Closed fracture of surgical neck of humerus (1/12/2022)  O/p follow up per Dr Jayne Palmer      Acute respiratory failure with hypoxia ([facility] [address] (1/12/2022)   acute,compensatory      Metabolic acidosis (4/29/3080)  + anion gap, LA only 1.0  Bicarb drip initiated   Unclear etiology, LA WNL, glucose acceptable, no GI loss    Move to the ICU, CPAP, bicarb, abx. Prophylaxis: PPI, DVT  Full Code  [employer], NP-C    More than 50% of time documented was spent in face-to-face contact with the patient and in the care of the patient on the floor/unit where the patient is located. I have spoken with and examined the patient. I agree with the above assessment and plan as documented.     Gen: alert , tachypneic  Lungs:  Basilar crackles  Heart:  RRR with no Murmur/Rubs/Gallops  Abd:obese  Ext: no edeam    Metabolic acidosis,  resp distress- moved to icu -to start hco3 but no access so far, placed on bipap -probable sepsis on zosyn and vanc-eill need pic or central line    Alfred Garcia MD

## 2022-01-13 NOTE — H&P
Date of Surgery Update:  Amanda Coleman was seen and examined. History and physical has been reviewed. The patient has been examined.  There have been no significant clinical changes since the completion of the originally dated History and Physical.    Signed By: Melinda Recinos MD     January 13, 2022 2:11 PM

## 2022-01-14 NOTE — PROGRESS NOTES
VANCO DAILY FOLLOW UP NOTE  [facility] Pharmacokinetic Monitoring Service - Vancomycin    Consulting Provider: Dr. Li Crawford   Indication: sepsis of unknown etiology  Target Concentration: Goal AUC/JAMIL 400-600 mg*hr/L  Day of Therapy: 2  Additional Antimicrobials: azithromycin, pip/tazo    Pertinent Laboratory Values: Wt Readings from Last 1 Encounters:   01/11/22 99.8 kg (220 lb)     Temp Readings from Last 1 Encounters:   01/13/22 98.5 °F (36.9 °C)     No components found for: PROCAL  Recent Labs     01/14/22  0243 01/13/22  2129 01/13/22  1451 01/13/22  1442 01/13/22  0713 01/13/22  0713 01/12/22  0229 01/12/22 0229   BUN 25* 27* 31*  --    < > 20   < > 17   CREA 0.84 0.94 0.90  --    < > 0.63   < > 0.60   WBC 17.7*  --   --   --   --  17.1*  --  14.6*   LAC 0.9  --   --  1.8  --   --   --  1.0    < > = values in this interval not displayed. Estimated Creatinine Clearance: 76.7 mL/min (based on SCr of 0.84 mg/dL). No results found for: Selvin Joyce    MRSA Nasal Swab: N/A. Non-respiratory infection. .    Assessment:  Date/Time Dose Concentration AUC         Note: Serum concentrations collected for AUC dosing may appear elevated if collected in close proximity to the dose administered, this is not necessarily an indication of toxicity    Plan:  1. Dosing recommendations based on Bayesian software  2. Continue vanocmycin 1250 mg q18h  a. Anticipated AUC of 568 and trough concentration of 15.9 at steady state  3. Renal labs as indicated   4. Vancomycin concentration ordered 1/14 @ 0400  5. Pharmacy will continue to monitor patient and adjust therapy as indicated    Thank you for the consult,  Dorian Schilder PharmD Candidate  Cecille Umana.  Frank PharmD, BCCCP

## 2022-01-14 NOTE — PROGRESS NOTES
Karla Shaikh  Admission Date: 1/11/2022         Daily Progress Note: 1/14/2022    The patient's chart is reviewed and the patient is discussed with the staff. Background: 57 y.o. female presented after a falling right hip. She fell over a week ago and injuring her shoulder. She has been on norco. XR hip without acute process. She has a left humeral fracture that occurred while on [employer]. Not seen by ortho to date until here. Records were evaluated per ortho this admit and recommends CT left shoulder and outpatient followup with Dr. Ashwin Cedeño. She has underlying asthma, DM2, CVA, obesity, neuropathy, and  JESSICA on CPAP.      She was hypoxic, placed on 2 lpm. CXR with atelectasis/infiltrates. + UTI and abx initiated. Today, she was found to be severely acidotic, metabolic. Bicarb ordered, plan to repeat in 2 hours. She was placed on BiPAP and transfer orders placed for ICU. We were asked to assist with care while acutely ill. Subjective:     Sleepy, remains on BIPAP 24%, RR 25. Remains acidotic, with bicarb 12. UOP 6L. Had glucose >1000 and slight ketones on UA 1/12.      Current Facility-Administered Medications   Medication Dose Route Frequency    NUTRITIONAL SUPPORT ELECTROLYTE PRN ORDERS   Does Not Apply PRN    piperacillin-tazobactam (ZOSYN) 3.375 g in 0.9% sodium chloride (MBP/ADV) 100 mL MBP  3.375 g IntraVENous Q8H    albuterol (PROVENTIL VENTOLIN) nebulizer solution 2.5 mg  2.5 mg Nebulization Q4H PRN    sodium bicarbonate (8.4%) 150 mEq in dextrose 5% 1,000 mL infusion   IntraVENous CONTINUOUS    vancomycin (VANCOCIN) 1250 mg in  ml infusion  1,250 mg IntraVENous Q18H    nystatin (MYCOSTATIN) 100,000 unit/gram powder   Topical BID    morphine injection 2 mg  2 mg IntraVENous Q4H PRN    sodium chloride (NS) flush 5-40 mL  5-40 mL IntraVENous Q8H    sodium chloride (NS) flush 5-40 mL  5-40 mL IntraVENous PRN    acetaminophen (TYLENOL) tablet 650 mg  650 mg Oral Q6H PRN Or    acetaminophen (TYLENOL) suppository 650 mg  650 mg Rectal Q6H PRN    polyethylene glycol (MIRALAX) packet 17 g  17 g Oral DAILY PRN    ondansetron (ZOFRAN ODT) tablet 4 mg  4 mg Oral Q8H PRN    Or    ondansetron (ZOFRAN) injection 4 mg  4 mg IntraVENous Q6H PRN    enoxaparin (LOVENOX) injection 40 mg  40 mg SubCUTAneous DAILY    magnesium hydroxide (MILK OF MAGNESIA) 400 mg/5 mL oral suspension 30 mL  30 mL Oral DAILY PRN    alum-mag hydroxide-simeth (MYLANTA) oral suspension 15 mL  15 mL Oral Q6H PRN    azithromycin (ZITHROMAX) 500 mg in 0.9% sodium chloride 250 mL (VIAL-MATE)  500 mg IntraVENous Q24H    insulin lispro (HUMALOG) injection 0-10 Units  0-10 Units SubCUTAneous AC&HS    amitriptyline (ELAVIL) tablet 25 mg  25 mg Oral QHS PRN    atorvastatin (LIPITOR) tablet 20 mg  20 mg Oral DAILY    aspirin delayed-release tablet 81 mg  81 mg Oral DAILY    budesonide-formoterol (SYMBICORT) 80-4.5 mcg inhaler  2 Puff Inhalation BID RT    pantoprazole (PROTONIX) tablet 40 mg  40 mg Oral ACB    sertraline (ZOLOFT) tablet 100 mg  100 mg Oral DAILY    HYDROcodone-acetaminophen (NORCO) 5-325 mg per tablet 1 Tablet  1 Tablet Oral Q8H PRN    docusate sodium (COLACE) capsule 100 mg  100 mg Oral BID     Review of Systems  Unobtainable due to patient status.   Objective:     Vitals:    01/14/22 0730 01/14/22 0745 01/14/22 0800 01/14/22 0815   BP:  133/76 (!) 147/73 139/72   Pulse: (!) 119 (!) 119 (!) 119 (!) 120   Resp:       Temp:       SpO2: 98% 98% 98% 98%   Weight:       Height:           Intake/Output Summary (Last 24 hours) at 1/14/2022 0840  Last data filed at 1/14/2022 0630  Gross per 24 hour   Intake 1321.67 ml   Output 5150 ml   Net -3828.33 ml     Physical Exam:   Constitution:  Elderly, acutely ill appearing  HEENT:  Sclera clear, pupils equal, oral mucosa moist  Respiratory: clear on BIPAP  Cardiovascular:  RRR without M,G,R  Gastrointestinal: soft and non-tender; with positive bowel sounds. Musculoskeletal: warm without cyanosis. There is no lower extremity edema. Skin:  no jaundice or rashes, Left arm splinted  Neurologic: sleepy    Psychiatric:  calm    CXR: 1/13: clear, new L subclavian, no issues      LAB:  Recent Labs     01/14/22 0243 01/13/22 1442 01/13/22 0713 01/12/22 0229   WBC 17.7*  --  17.1* 14.6*   HGB 12.6  --  14.0 12.0   HCT 39.7  --  46.3 37.8   *  --  521* 347   LAC 0.9 1.8  --  1.0     Recent Labs     01/14/22 0243 01/13/22 2129 01/13/22  1451 01/13/22 0713 01/13/22 0713 01/12/22 0229 01/12/22 0229    144 138   < > 135*   < > 137   K 3.1* 3.5 4.6   < > 5.1   < > 4.1   * 113* 106   < > 106   < > 102   CO2 12* 10* 7*   < > 6*   < > 27   * 253* 317*   < > 195*   < > 150*   BUN 25* 27* 31*   < > 20   < > 17   CREA 0.84 0.94 0.90   < > 0.63   < > 0.60   MG  --   --   --   --  2.9*  --   --    CA 8.9 9.3 9.5   < > 9.5   < > 8.7   ALB  --   --   --   --   --   --  2.9*   AST  --   --   --   --   --   --  8*   ALT  --   --   --   --   --   --  12   AP  --   --   --   --   --   --  63    < > = values in this interval not displayed. Recent Labs     01/14/22 0243 01/13/22 1442 01/12/22 0229   LAC 0.9 1.8 1.0     Recent Labs     01/13/22  1208 01/13/22  0830   PHI 7.12* 7.11*   PCO2I 14.5* 13.0*   PO2I 142* 112*   HCO3I 4.7* 4.1*     Recent Labs     01/12/22  1249 01/12/22 0229 01/12/22  0227   CULT NO GROWTH 2 DAYS NO GROWTH 2 DAYS >100,000 COLONIES/mL GRAM NEGATIVE RODS SUBCULTURE IN PROGRESS*  50,000-100,000 COLONIES/mL NORMAL SKIN SNOW ISOLATED     Assessment and Plan:  (Medical Decision Making)   Principal Problem:    Sepsis ([address] (1/12/2022)    UTI with GNR, continue Zosyn, stop Vanc, no pneumonia on CXXR    Active Problems:    Pneumonia (1/12/2022)    Doubt      UTI (urinary tract infection) (1/12/2022)     Continue Zosyn, follow up culture      Hypoxia (1/12/2022)    Mild, acutely confused related, CXR clear. Monitor I/O.  Negative balance last 24 Hours      Obesity (1/12/2022)    Increases management risk      Closed fracture of surgical neck of humerus (1/12/2022)    Per ortho, splinted for now      Acute respiratory failure with hypoxia ([address] (1/12/2022)    Mild, once acidosis corrected I believe will be able to come off BIPAP rather easily      Metabolic acidosis (3/71/2926)    Suspect this represents relatively euglycemic DKA due to jardiance. This is held now. Acidosis is resistent to bicarb gtt and gap remains. Start insulin gtt, can adjust bicarb D5 and insulin down  To off together as gap and acidosis are corrected. Follow BMP closely Q4H. Glucose hourly. Remains need for ICU.     Full Code    The patient is critically ill with respiratory failure, circulatory failure and requires high complexity decision making for assessment and support including frequent ventilator adjustment , frequent evaluation and titration of therapies , application of advanced monitoring technologies and extensive interpretation of multiple databases    Cumulative time devoted to patient care services by me for day of service -31 min     Viri Luna MD

## 2022-01-14 NOTE — DIABETES MGMT
Patient admitted with sepsis, pneumonia. Patient has a PMH of DM2, depression, HTN, neuropathy, retinopathy, CVA, asthma, and obesity. Patient had a recent humerus fracture on a cruise ship, but will need to recover from pneumonia prior to surgical treatment per ortho. Patient's diabetes is managed by her PCP: Dr. Jocelyn Rosas with [facility]. Per her EHR, her last visit with PCP was in December and her regimen was listed as Jardiance 10 mg daily, Janumet  mg BID, and glipizide XL 10 mg daily. Note patient was treated for a yeast infection at that time as well. A1c was 7.2. Blood glucose on admission 150. Blood glucose ranged 195-317 yesterday with patient receiving Humalog 14 units. Blood glucose 244 this morning. WBC 17. 7. K+ 3.1. Gap 19. Provider feels like this may be euglycemic DKA related to SGLT2 (Jardiance). Given patient already had yeast infection, and now has UTI and DKA would recommend discontinuing Jardiance at discharge and letting patient follow up with PCP for discussion.

## 2022-01-14 NOTE — PROGRESS NOTES
PT Discharge Note:  Patient has experienced a decline in medical status with subsequent transfer to the ICU and will be discharged from PT services at this time. Please reorder PT in the future when patient would benefit from our services.   Thank you,  Ita Benton, PTA

## 2022-01-14 NOTE — PROGRESS NOTES
Hospitalist Progress Note   Admit Date:  2022 10:04 PM   Name:  Mayte Galdamez   Age:  57 y.o. Sex:  female  :  1960   MRN:  [id number]   Room:  [facility] [place]    Presenting Complaint: Fall    Reason(s) for Admission: Pneumonia [J18.9]     Hospital Course & Interval History:     Ms. Torsten Anthony is a 56 yo female with PMH of asthma, DM2, CVA, obesity, neuropathy, JESSICA on CPAP admitted s/p fall with recent left humeral fracture that occurred [place] on [employer]. Not seen by ortho to date. Records were Evaluated per ortho this admit and recommends CT left shoulder and outpatient followup with Dr. Jayne Palmer. Found hypoxic and meets sepsis criteria due to fever / leukocytosis. Has CXR showing LLL pneumonia and UTI. COVID negative. She is on course of antibiotics. Discharge plans pending. 2022  Called with bicarb of 6 and increased respirations, complaints of shortness of breath and back pain, thirsty, ROS limited due to urgent issues     Subjective (22): Patient seen and evaluated. New patient for me today.   History as above  Transferred to the ICU for acidosis found to be in DKA  Seen by pulmonology today and started on glucose stabilizer  Anion gap is still not closed        Assessment & Plan:         Acute metabolic acidosis- new on 22   · Bicarb 6 on BMP  · STAT ABG and BMP, lactate ordered  · 2 amps bicarb ordered  · Repeat ABG in 2 hours   · O2 ok on 4 L NC  · STAT CXR reordered  · CTA chest when more stable  · Expand antibiotics to vancomycin and zosyn  · May need ICU transfer pending course, ICU Lisa RN aware of case and will assist monitoring     2022  Continue IV fluids  Continue insulin per glucose stabilizer protocol  Plan for vq scan when more medically stable        Principal Problem:    Acute respiratory failure with hypoxia ([address] (2022)  Active Problems:    Pneumonia (2022)    Sepsis:  · D3 antibiotics, continue vancomycin/ zosyn  · Wean O2 as tolerant, currently on BIPAP  · followup cultures             UTI (urinary tract infection) (1/12/2022)  · D2 antibiotics  · followup cultures -GNR- id & sensitivity pending             Obesity (1/12/2022)         Closed fracture of surgical neck of humerus (1/12/2022)  ·   Outpatient ortho  · Supportive care   · PT,OT      Prior CVA:  · Continued asa, lipitor      DM2:  · SSI        Dispo/Discharge Planning:      Pending     Diet:  DIET NPO  DVT PPx: lovenox  Code status: Full Code    Hospital Problems as of 1/14/2022 Never Reviewed          Codes Class Noted - Resolved POA    Metabolic acidosis RKF-[id number]-NU: E87.2  ICD-9-CM: 276.2  1/13/2022 - Present No        Pneumonia ICD-10-CM: J18.9  ICD-9-CM: 675  1/12/2022 - Present Yes        UTI (urinary tract infection) ICD-10-CM: N39.0  ICD-9-CM: 599.0  1/12/2022 - Present Yes        Hypoxia ICD-10-CM: R09.02  ICD-9-CM: 799.02  1/12/2022 - Present Yes        Obesity (Chronic) ICD-10-CM: E66.9  ICD-9-CM: 278.00  1/12/2022 - Present Yes        Closed fracture of surgical neck of humerus ICD-10-CM: S42.213A  ICD-9-CM: 812.01  1/12/2022 - Present Yes        Acute respiratory failure with hypoxia (HCC) ICD-10-CM: J96.01  ICD-9-CM: 518.81  1/12/2022 - Present Yes        * (Principal) Sepsis ([address] ICD-10-CM: A41.9  ICD-9-CM: 038.9, 995.91  1/12/2022 - Present Yes              Objective:     Patient Vitals for the past 24 hrs:   Temp Pulse Resp BP SpO2   01/14/22 1515 97.9 °F (36.6 °C) (!) 120 17 (!) 148/70 97 %   01/14/22 1500  (!) 125  (!) 147/75 97 %   01/14/22 1445  (!) 120  (!) 144/65 97 %   01/14/22 1430  (!) 121   97 %   01/14/22 1415  (!) 118  (!) 150/83 96 %   01/14/22 1400  (!) 122  (!) 165/102 98 %   01/14/22 1345  (!) 121  (!) 149/88 97 %   01/14/22 1330  (!) 119   97 %   01/14/22 1315  (!) 120  (!) 143/81 97 %   01/14/22 1300  (!) 122  (!) 143/74 98 %   01/14/22 1245  (!) 119  (!) 148/70 97 %   01/14/22 1230  (!) 120  (!) 155/65 97 %   01/14/22 1215  (!) 120   96 %   01/14/22 1200  (!) 122  (!) 142/76 98 %   01/14/22 1145  (!) 121  139/74 98 %   01/14/22 1130  (!) 123   98 %   01/14/22 1115 99.4 °F (37.4 °C) (!) 124 22 (!) 152/68 97 %   01/14/22 1100  (!) 123  (!) 143/80 97 %   01/14/22 1045  (!) 124  114/69 99 %   01/14/22 1030  (!) 121   98 %   01/14/22 1015  (!) 124  131/82 98 %   01/14/22 1000  (!) 126  (!) 144/76 97 %   01/14/22 0945  (!) 125  133/75 100 %   01/14/22 0930  (!) 123   97 %   01/14/22 0915  (!) 122  (!) 150/76 97 %   01/14/22 0900  (!) 121  (!) 149/83 98 %   01/14/22 0859     98 %   01/14/22 0845  (!) 121  (!) 134/94 98 %   01/14/22 0815  (!) 120  139/72 98 %   01/14/22 0800  (!) 119  (!) 147/73 98 %   01/14/22 0745  (!) 119  133/76 98 %   01/14/22 0730  (!) 119   98 %   01/14/22 0715  (!) 118  138/82 98 %   01/14/22 0700 100 °F (37.8 °C) (!) 118 21 132/66 98 %   01/14/22 0645  (!) 117  131/67 99 %   01/14/22 0630  (!) 118   99 %   01/14/22 0615  (!) 116  103/81 99 %   01/14/22 0600  (!) 119  (!) 143/81 99 %   01/14/22 0545  (!) 118  (!) 151/64 99 %   01/14/22 0530  (!) 119   98 %   01/14/22 0515  (!) 120  137/89 99 %   01/14/22 0500  (!) 120  (!) 146/75 98 %   01/14/22 0449     98 %   01/14/22 0445  (!) 122  (!) 151/77 99 %   01/14/22 0430  (!) 118  (!) 151/79 98 %   01/14/22 0415  (!) 120  (!) 158/106 98 %   01/14/22 0400  (!) 116  (!) 140/70 98 %   01/14/22 0345  (!) 118  (!) 146/75 98 %   01/14/22 0330  (!) 119   99 %   01/14/22 0315  (!) 116  133/73 98 %   01/14/22 0300 98.8 °F (37.1 °C) (!) 118 24 (!) 141/63 98 %   01/14/22 0245  (!) 121  133/70 99 %   01/14/22 0230  (!) 117   98 %   01/14/22 0215  (!) 117  (!) 147/71 98 %   01/14/22 0200  (!) 120  136/79 99 %   01/14/22 0145  (!) 119  135/69 98 %   01/14/22 0130  (!) 122   99 %   01/14/22 0115  (!) 122  132/64 98 %   01/14/22 0100  (!) 122  (!) 149/65 99 %   01/14/22 0045  (!) 117  (!) 154/76 99 %   01/14/22 0030  (!) 120   98 %   01/14/22 0015  (!) 120  139/63 99 %   01/14/22 0000  (!) 118  (!) 151/69 99 %   01/13/22 2345  (!) 117  (!) 158/75 100 %   01/13/22 2330  (!) 119   99 %   01/13/22 2315  (!) 117  (!) 151/73 99 %   01/13/22 2300 99.2 °F (37.3 °C) (!) 120 30 (!) 153/78 99 %   01/13/22 2245  (!) 115  (!) 151/72 99 %   01/13/22 2230  (!) 118   100 %   01/13/22 2215  (!) 117  (!) 152/80 99 %   01/13/22 2200  (!) 122  (!) 147/73 100 %   01/13/22 2145  (!) 120  (!) 151/65 99 %   01/13/22 2130  (!) 119  (!) 146/71 100 %   01/13/22 2115  (!) 121   99 %   01/13/22 2100  (!) 119  (!) 161/83 99 %   01/13/22 2045  (!) 116  (!) 156/75 99 %   01/13/22 2030  (!) 117  (!) 156/75 99 %   01/13/22 2015  (!) 119  (!) 168/77 99 %   01/13/22 2000  (!) 121  (!) 149/76 99 %   01/13/22 1945  (!) 119  (!) 150/74 99 %   01/13/22 1935  (!) 121  (!) 144/74 99 %   01/13/22 1930  (!) 121   99 %   01/13/22 1920  (!) 123  (!) 159/96 99 %   01/13/22 1915  (!) 120   99 %   01/13/22 1905  (!) 119  (!) 143/66 99 %   01/13/22 1900 99.8 °F (37.7 °C) (!) 120 (!) 40  99 %   01/13/22 1850  (!) 123  (!) 169/74 100 %   01/13/22 1845  (!) 123   99 %   01/13/22 1835  (!) 121  (!) 160/93 99 %   01/13/22 1820  (!) 120  (!) 162/84 99 %   01/13/22 1805  (!) 123  (!) 157/74 100 %   01/13/22 1750  (!) 122   99 %   01/13/22 1735  (!) 124  (!) 154/77 100 %   01/13/22 1720  (!) 123  (!) 154/73 99 %   01/13/22 1705  (!) 123  136/67 99 %     Oxygen Therapy  O2 Sat (%): 97 % (01/14/22 1515)  Pulse via Oximetry: 120 beats per minute (01/14/22 1515)  O2 Device: Nasal cannula (01/14/22 1515)  Skin Assessment: Clean, dry, & intact (01/14/22 0700)  Skin Protection for O2 Device: Yes (01/14/22 0700)  Orientation: Middle (01/14/22 0700)  Location: Other (Comment) (nasal bridge) (01/14/22 0700)  Interventions: Mouth Care (01/14/22 0700)  O2 Flow Rate (L/min): 4 l/min (01/14/22 1515)  FIO2 (%): 25 % (01/14/22 1115)    Estimated body mass index is 40.24 kg/m² as calculated from the following:    Height as of this encounter: 5' 2\" (1.575 m). Weight as of this encounter: 99.8 kg (220 lb). Intake/Output Summary (Last 24 hours) at 1/14/2022 1657  Last data filed at 1/14/2022 1651  Gross per 24 hour   Intake 1321.67 ml   Output 7350 ml   Net -6028.33 ml         Physical Exam:     Blood pressure (!) 148/70, pulse (!) 120, temperature 97.9 °F (36.6 °C), resp. rate 17, height 5' 2\" (1.575 m), weight 99.8 kg (220 lb), SpO2 97 %. General:    Well nourished. , elderly, increased respiratory rate with increased work of breathing   CV:   RRR. No m/r/g. No jugular venous distension. No edema   Lungs:     Coarse   Abdomen: Bowel sounds present. Soft, nontender, nondistended. Extremities: No cyanosis or clubbing. No edema  Skin:     No rashes and normal coloration. Warm and dry. Neuro:   grossly intact.     Psych:  anxious    I have reviewed ordered lab tests and independently visualized imaging below:    Recent Labs:  Recent Results (from the past 48 hour(s))   GLUCOSE, POC    Collection Time: 01/12/22  9:25 PM   Result Value Ref Range    Glucose (POC) 143 (H) 65 - 100 mg/dL    Performed by Breanna    METABOLIC PANEL, BASIC    Collection Time: 01/13/22  7:13 AM   Result Value Ref Range    Sodium 135 (L) 136 - 145 mmol/L    Potassium 5.1 3.5 - 5.1 mmol/L    Chloride 106 98 - 107 mmol/L    CO2 6 (LL) 21 - 32 mmol/L    Anion gap 23 (H) 7 - 16 mmol/L    Glucose 195 (H) 65 - 100 mg/dL    BUN 20 8 - 23 MG/DL    Creatinine 0.63 0.6 - 1.0 MG/DL    GFR est AA >60 >60 ml/min/1.73m2    GFR est non-AA >60 >60 ml/min/1.73m2    Calcium 9.5 8.3 - 10.4 MG/DL   MAGNESIUM    Collection Time: 01/13/22  7:13 AM   Result Value Ref Range    Magnesium 2.9 (H) 1.8 - 2.4 mg/dL   CBC W/O DIFF    Collection Time: 01/13/22  7:13 AM   Result Value Ref Range    WBC 17.1 (H) 4.3 - 11.1 K/uL    RBC 5.24 (H) 4.05 - 5.2 M/uL    HGB 14.0 11.7 - 15.4 g/dL    HCT 46.3 35.8 - 46.3 %    MCV 88.4 79.6 - 97.8 FL    MCH 26.7 26.1 - 32.9 PG    MCHC 30.2 (L) 31.4 - 35.0 g/dL    RDW 14.2 11.9 - 14.6 %    PLATELET 951 (H) 133 - 450 K/uL    MPV 9.1 (L) 9.4 - 12.3 FL    ABSOLUTE NRBC 0.00 0.0 - 0.2 K/uL   GLUCOSE, POC    Collection Time: 01/13/22  7:52 AM   Result Value Ref Range    Glucose (POC) 205 (H) 65 - 100 mg/dL    Performed by Alee    BLOOD GAS, ARTERIAL POC    Collection Time: 01/13/22  8:30 AM   Result Value Ref Range    pH (POC) 7.11 (LL) 7.35 - 7.45      pCO2 (POC) 13.0 (LL) 35 - 45 MMHG    pO2 (POC) 112 (H) 75 - 100 MMHG    HCO3 (POC) 4.1 (L) 22 - 26 MMOL/L    sO2 (POC) 96.7 95 - 98 %    Base deficit (POC) 23.0 mmol/L    Allens test (POC) Positive      Site RIGHT RADIAL      Specimen type (POC) ARTERIAL      Performed by Eduardo     FIO2, L/min 2     BLOOD GAS, ARTERIAL POC    Collection Time: 01/13/22 12:08 PM   Result Value Ref Range    Device: BIPAP MASK      FIO2 (POC) 30 %    pH (POC) 7.12 (LL) 7.35 - 7.45      pCO2 (POC) 14.5 (LL) 35 - 45 MMHG    pO2 (POC) 142 (H) 75 - 100 MMHG    HCO3 (POC) 4.7 (L) 22 - 26 MMOL/L    sO2 (POC) 98.3 (H) 95 - 98 %    Base deficit (POC) 22.4 mmol/L    Mode BILEVEL      PEEP/CPAP (POC) 8 cmH2O    PIP (POC) 16      Pressure support 14 cmH2O    Allens test (POC) Positive      Inspiratory Time 0.9 sec    Total resp.  rate 34      Site LEFT RADIAL      Specimen type (POC) ARTERIAL      Performed by Gail     CO2, POC 6 (LL) 13 - 23 MMOL/L    Critical value read back OLIVARES     Respiratory comment: ve35.2    PLEASE READ & DOCUMENT PPD TEST IN 24 HRS    Collection Time: 01/13/22  2:04 PM   Result Value Ref Range    PPD Negative Negative    mm Induration 0 0 - 5 mm   PLEASE READ & DOCUMENT PPD TEST IN 48 HRS    Collection Time: 01/13/22  2:04 PM   Result Value Ref Range    PPD      mm Induration     LACTIC ACID    Collection Time: 01/13/22  2:42 PM   Result Value Ref Range    Lactic acid 1.8 0.4 - 2.0 MMOL/L   METABOLIC PANEL, BASIC    Collection Time: 01/13/22  2:51 PM   Result Value Ref Range    Sodium 138 136 - 145 mmol/L    Potassium 4.6 3.5 - 5.1 mmol/L    Chloride 106 98 - 107 mmol/L    CO2 7 (LL) 21 - 32 mmol/L    Anion gap 25 (H) 7 - 16 mmol/L    Glucose 317 (H) 65 - 100 mg/dL    BUN 31 (H) 8 - 23 MG/DL    Creatinine 0.90 0.6 - 1.0 MG/DL    GFR est AA >60 >60 ml/min/1.73m2    GFR est non-AA >60 >60 ml/min/1.73m2    Calcium 9.5 8.3 - 10.4 MG/DL   GLUCOSE, POC    Collection Time: 01/13/22  4:05 PM   Result Value Ref Range    Glucose (POC) 280 (H) 65 - 100 mg/dL    Performed by Kaden    METABOLIC PANEL, BASIC    Collection Time: 01/13/22  9:29 PM   Result Value Ref Range    Sodium 144 136 - 145 mmol/L    Potassium 3.5 3.5 - 5.1 mmol/L    Chloride 113 (H) 98 - 107 mmol/L    CO2 10 (L) 21 - 32 mmol/L    Anion gap 21 (H) 7 - 16 mmol/L    Glucose 253 (H) 65 - 100 mg/dL    BUN 27 (H) 8 - 23 MG/DL    Creatinine 0.94 0.6 - 1.0 MG/DL    GFR est AA >60 >60 ml/min/1.73m2    GFR est non-AA >60 >60 ml/min/1.73m2    Calcium 9.3 8.3 - 10.4 MG/DL   GLUCOSE, POC    Collection Time: 01/13/22 10:06 PM   Result Value Ref Range    Glucose (POC) 228 (H) 65 - 100 mg/dL    Performed by Katie Reyna    METABOLIC PANEL, BASIC    Collection Time: 01/14/22  2:43 AM   Result Value Ref Range    Sodium 144 136 - 145 mmol/L    Potassium 3.1 (L) 3.5 - 5.1 mmol/L    Chloride 113 (H) 98 - 107 mmol/L    CO2 12 (L) 21 - 32 mmol/L    Anion gap 19 (H) 7 - 16 mmol/L    Glucose 250 (H) 65 - 100 mg/dL    BUN 25 (H) 8 - 23 MG/DL    Creatinine 0.84 0.6 - 1.0 MG/DL    GFR est AA >60 >60 ml/min/1.73m2    GFR est non-AA >60 >60 ml/min/1.73m2    Calcium 8.9 8.3 - 10.4 MG/DL   CBC W/O DIFF    Collection Time: 01/14/22  2:43 AM   Result Value Ref Range    WBC 17.7 (H) 4.3 - 11.1 K/uL    RBC 4.65 4.05 - 5.2 M/uL    HGB 12.6 11.7 - 15.4 g/dL    HCT 39.7 35.8 - 46.3 %    MCV 85.4 79.6 - 97.8 FL    MCH 27.1 26.1 - 32.9 PG    MCHC 31.7 31.4 - 35.0 g/dL    RDW 14.7 (H) 11.9 - 14.6 %    PLATELET 691 (H) 411 - 450 K/uL    MPV 8.4 (L) 9.4 - 12.3 FL    ABSOLUTE NRBC 0.00 0.0 - 0.2 K/uL   LACTIC ACID    Collection Time: 01/14/22  2:43 AM   Result Value Ref Range    Lactic acid 0.9 0.4 - 2.0 MMOL/L   METABOLIC PANEL, BASIC    Collection Time: 01/14/22  8:38 AM   Result Value Ref Range    Sodium 145 136 - 145 mmol/L    Potassium 3.5 3.5 - 5.1 mmol/L    Chloride 116 (H) 98 - 107 mmol/L    CO2 11 (L) 21 - 32 mmol/L    Anion gap 18 (H) 7 - 16 mmol/L    Glucose 268 (H) 65 - 100 mg/dL    BUN 20 8 - 23 MG/DL    Creatinine 0.70 0.6 - 1.0 MG/DL    GFR est AA >60 >60 ml/min/1.73m2    GFR est non-AA >60 >60 ml/min/1.73m2    Calcium 9.0 8.3 - 10.4 MG/DL   MAGNESIUM    Collection Time: 01/14/22  8:38 AM   Result Value Ref Range    Magnesium 2.8 (H) 1.8 - 2.4 mg/dL   PHOSPHORUS    Collection Time: 01/14/22  8:38 AM   Result Value Ref Range    Phosphorus 2.2 (L) 2.3 - 3.7 MG/DL   HEMOGLOBIN A1C WITH EAG    Collection Time: 01/14/22  8:38 AM   Result Value Ref Range    Hemoglobin A1c 7.0 (H) 4.20 - 6.30 %    Est. average glucose 154 mg/dL   GLUCOSE, POC    Collection Time: 01/14/22  8:48 AM   Result Value Ref Range    Glucose (POC) 244 (H) 65 - 100 mg/dL    Performed by Kaden    BLOOD GAS, ARTERIAL POC    Collection Time: 01/14/22  9:38 AM   Result Value Ref Range    Device: BIPAP MASK      FIO2 (POC) 24 %    pH (POC) 7.36 7.35 - 7.45      pCO2 (POC) 12.8 (LL) 35 - 45 MMHG    pO2 (POC) 117 (H) 75 - 100 MMHG    HCO3 (POC) 7.2 (L) 22 - 26 MMOL/L    sO2 (POC) 98.6 (H) 95 - 98 %    Base deficit (POC) 15.1 mmol/L    Mode Non Invasive      PEEP/CPAP (POC) 8 cmH2O    Allens test (POC) Positive      Site RIGHT RADIAL      Specimen type (POC) ARTERIAL      Performed by Neftaly)Sangeeta     Critical value read back PIERO     Respiratory comment: ve26.9    GLUCOSE, POC    Collection Time: 01/14/22 10:15 AM   Result Value Ref Range    Glucose (POC) 294 (H) 65 - 100 mg/dL    Performed by Dat Pedro    Collection Time: 01/14/22 10:16 AM   Result Value Ref Range    Glucose 294 mg/dL    Insulin order 4.7 units/hour    Insulin adminstered 4.7 units/hour    Multiplier 0.020     Low target 150 mg/dL    High target 250 mg/dL    D50 order 0.0 ml    D50 administered 0.00 ml    Minutes until next BG 60 min    Order initials MICHELE     Administered initials Harika Marroquin     GLUCOSE, POC    Collection Time: 01/14/22 11:15 AM   Result Value Ref Range    Glucose (POC) 249 (H) 65 - 100 mg/dL    Performed by Dat Pedro    Collection Time: 01/14/22 11:16 AM   Result Value Ref Range    Glucose 249 mg/dL    Insulin order 3.8 units/hour    Insulin adminstered 3.8 units/hour    Multiplier 0.020     Low target 150 mg/dL    High target 250 mg/dL    D50 order 0.0 ml    D50 administered 0.00 ml    Minutes until next BG 60 min    Order initials MICHELE     Administered initials Harika Marroquin     GLUCOSE, POC    Collection Time: 01/14/22 12:18 PM   Result Value Ref Range    Glucose (POC) 225 (H) 65 - 100 mg/dL    Performed by Luis Jason    Collection Time: 01/14/22 12:19 PM   Result Value Ref Range    Glucose 225 mg/dL    Insulin order 3.3 units/hour    Insulin adminstered 3.3 units/hour    Multiplier 0.020     Low target 150 mg/dL    High target 250 mg/dL    D50 order 0.0 ml    D50 administered 0.00 ml    Minutes until next BG 60 min    Order initials TW     Administered initials TW     GLSCOM Comments     METABOLIC PANEL, BASIC    Collection Time: 01/14/22 12:27 PM   Result Value Ref Range    Sodium 148 (H) 136 - 145 mmol/L    Potassium 3.0 (L) 3.5 - 5.1 mmol/L    Chloride 119 (H) 98 - 107 mmol/L    CO2 12 (L) 21 - 32 mmol/L    Anion gap 17 (H) 7 - 16 mmol/L    Glucose 259 (H) 65 - 100 mg/dL    BUN 18 8 - 23 MG/DL    Creatinine 0.86 0.6 - 1.0 MG/DL    GFR est AA >60 >60 ml/min/1.73m2    GFR est non-AA >60 >60 ml/min/1.73m2    Calcium 9.2 8.3 - 10.4 MG/DL   MAGNESIUM    Collection Time: 01/14/22 12:27 PM   Result Value Ref Range    Magnesium 1.8 1.8 - 2.4 mg/dL   GLUCOSE, POC    Collection Time: 01/14/22  1:21 PM   Result Value Ref Range    Glucose (POC) 209 (H) 65 - 100 mg/dL    Performed by Luis Jason    Collection Time: 01/14/22  1:22 PM   Result Value Ref Range    Glucose 209 mg/dL    Insulin order 3.0 units/hour    Insulin adminstered 3.0 units/hour    Multiplier 0.020     Low target 150 mg/dL    High target 250 mg/dL    D50 order 0.0 ml    D50 administered 0.00 ml    Minutes until next BG 60 min    Order initials TW     Administered initials TW     GLSCOM Comments     GLUCOSE, POC    Collection Time: 01/14/22  2:29 PM   Result Value Ref Range    Glucose (POC) 205 (H) 65 - 100 mg/dL    Performed by Dat Pedro    Collection Time: 01/14/22  2:31 PM   Result Value Ref Range    Glucose 205 mg/dL    Insulin order 2.9 units/hour    Insulin adminstered 2.9 units/hour    Multiplier 0.020     Low target 150 mg/dL    High target 250 mg/dL    D50 order 0.0 ml    D50 administered 0.00 ml    Minutes until next BG 60 min    Order initials MICHELE     Administered initials MICHELE     GLSCOM Comments     GLUCOSE, POC    Collection Time: 01/14/22  3:33 PM   Result Value Ref Range    Glucose (POC) 203 (H) 65 - 100 mg/dL    Performed by Dat Pedro    Collection Time: 01/14/22  3:34 PM   Result Value Ref Range    Glucose 203 mg/dL    Insulin order 2.9 units/hour    Insulin adminstered 2.9 units/hour    Multiplier 0.020     Low target 150 mg/dL    High target 250 mg/dL    D50 order 0.0 ml    D50 administered 0.00 ml    Minutes until next BG 60 min    Order initials MICHELE     Administered initials MICHELE     GLSCOM Comments     GLUCOSE, POC    Collection Time: 01/14/22  4:37 PM   Result Value Ref Range    Glucose (POC) 219 (H) 65 - 100 mg/dL    Performed by Shon Plata    Collection Time: 01/14/22  4:38 PM   Result Value Ref Range    Glucose 219 mg/dL    Insulin order 3.2 units/hour    Insulin adminstered 3.2 units/hour    Multiplier 0.020     Low target 150 mg/dL    High target 250 mg/dL    D50 order 0.0 ml    D50 administered 0.00 ml    Minutes until next BG 60 min    Order initials MICHELE     Administered initials MICHELE     GLSCOM Comments     BLOOD GAS & LYTES, ARTERIAL    Collection Time: 01/14/22  4:44 PM   Result Value Ref Range    pH (POC) 7.46 (H) 7.35 - 7.45      pCO2 (POC) 21.6 (L) 35 - 45 MMHG    pO2 (POC) 81 75 - 100 MMHG    Sodium,  (H) 136 - 145 MMOL/L    Potassium, POC 3.2 (L) 3.5 - 5.1 MMOL/L    Calcium, ionized (POC) 1.21 1.12 - 1.32 mmol/L    Glucose,  (H) 65 - 100 MG/DL    Base deficit (POC) 6.5 mmol/L    HCO3 (POC) 15.3 (L) 22 - 26 MMOL/L    CO2, POC 16 13 - 23 MMOL/L    O2 SAT 97 %    Sample source ARTERIAL      SITE RIGHT RADIAL      MISTI'S TEST Positive      Device: NASAL CANNULA      FIO2, L/min 4      Performed by Lakhani (Wallace)     GFRAA, POC Cannot be calculated >60 ml/min/1.73m2    GFRNA, POC Cannot be calculated >60 ml/min/1.73m2       All Micro Results     Procedure Component Value Units Date/Time    CULTURE, URINE [[id number]]  (Abnormal) Collected: 01/12/22 0227    Order Status: Completed Specimen: Urine from Clean catch Updated: 01/14/22 0955     Special Requests: NO SPECIAL REQUESTS        Culture result:       >100,000 COLONIES/mL GRAM NEGATIVE RODS IDENTIFICATION AND SUSCEPTIBILITY TO FOLLOW                  50,000-100,000 COLONIES/mL NORMAL SKIN SNOW ISOLATED          CULTURE, BLOOD [[id number]] Collected: 01/12/22 0229    Order Status: Completed Specimen: Blood Updated: 01/14/22 0700     Special Requests: --        RIGHT  Antecubital       Culture result: NO GROWTH 2 DAYS       CULTURE, BLOOD [[id number]] Collected: 01/12/22 1249    Order Status: Completed Specimen: Blood Updated: 01/14/22 0700     Special Requests: --        RIGHT  FOREARM       Culture result: NO GROWTH 2 DAYS       COVID-19 RAPID TEST [[id number]] Collected: 01/12/22 0227    Order Status: Completed Specimen: Nasopharyngeal Updated: 01/12/22 0320     Specimen source NASAL        COVID-19 rapid test Not detected        Comment:      The specimen is NEGATIVE for SARS-CoV-2, the novel coronavirus associated with COVID-19. A negative result does not rule out COVID-19. This test has been authorized by the FDA under an Emergency Use Authorization (EUA) for use by authorized laboratories. Fact sheet for Healthcare Providers: [employer]  Fact sheet for Patients: [employer]       Methodology: Isothermal Nucleic Acid Amplification               Other Studies:  No results found.     Current Meds:  Current Facility-Administered Medications   Medication Dose Route Frequency    NUTRITIONAL SUPPORT ELECTROLYTE PRN ORDERS   Does Not Apply PRN    insulin regular (NOVOLIN R, HUMULIN R) 100 Units in 0.9% sodium chloride 100 mL infusion  0-50 Units/hr IntraVENous TITRATE    dextrose 40% (GLUTOSE) oral gel 1 Tube  15 g Oral PRN    glucagon (GLUCAGEN) injection 1 mg  1 mg IntraMUSCular PRN    dextrose 10% infusion 125-250 mL  125-250 mL IntraVENous PRN    potassium chloride 20 mEq in 100 ml IVPB  20 mEq IntraVENous Q2H    piperacillin-tazobactam (ZOSYN) 3.375 g in 0.9% sodium chloride (MBP/ADV) 100 mL MBP  3.375 g IntraVENous Q8H    albuterol (PROVENTIL VENTOLIN) nebulizer solution 2.5 mg  2.5 mg Nebulization Q4H PRN    sodium bicarbonate (8.4%) 150 mEq in dextrose 5% 1,000 mL infusion   IntraVENous CONTINUOUS    nystatin (MYCOSTATIN) 100,000 unit/gram powder   Topical BID    morphine injection 2 mg  2 mg IntraVENous Q4H PRN    sodium chloride (NS) flush 5-40 mL  5-40 mL IntraVENous Q8H    sodium chloride (NS) flush 5-40 mL  5-40 mL IntraVENous PRN    acetaminophen (TYLENOL) tablet 650 mg  650 mg Oral Q6H PRN    Or    acetaminophen (TYLENOL) suppository 650 mg  650 mg Rectal Q6H PRN    polyethylene glycol (MIRALAX) packet 17 g  17 g Oral DAILY PRN    ondansetron (ZOFRAN ODT) tablet 4 mg  4 mg Oral Q8H PRN    Or    ondansetron (ZOFRAN) injection 4 mg  4 mg IntraVENous Q6H PRN    enoxaparin (LOVENOX) injection 40 mg  40 mg SubCUTAneous DAILY    magnesium hydroxide (MILK OF MAGNESIA) 400 mg/5 mL oral suspension 30 mL  30 mL Oral DAILY PRN    alum-mag hydroxide-simeth (MYLANTA) oral suspension 15 mL  15 mL Oral Q6H PRN    azithromycin (ZITHROMAX) 500 mg in 0.9% sodium chloride 250 mL (VIAL-MATE)  500 mg IntraVENous Q24H    amitriptyline (ELAVIL) tablet 25 mg  25 mg Oral QHS PRN    atorvastatin (LIPITOR) tablet 20 mg  20 mg Oral DAILY    aspirin delayed-release tablet 81 mg  81 mg Oral DAILY    budesonide-formoterol (SYMBICORT) 80-4.5 mcg inhaler  2 Puff Inhalation BID RT    pantoprazole (PROTONIX) tablet 40 mg  40 mg Oral ACB    sertraline (ZOLOFT) tablet 100 mg  100 mg Oral DAILY    HYDROcodone-acetaminophen (NORCO) 5-325 mg per tablet 1 Tablet  1 Tablet Oral Q8H PRN    docusate sodium (COLACE) capsule 100 mg  100 mg Oral BID       Signed:  Melisa Dennis MD    Part of this note may have been written by using a voice dictation software. The note has been proof read but may still contain some grammatical/other typographical errors.

## 2022-01-14 NOTE — PROGRESS NOTES
Pt transferred to [facility] ICU for hypoxia with need for BiPAP. Pt remains on BiPAP 24%/ insulin drip. Pt has known (L)humeral fracture which occurred on a recent cruise. PT/OT signed off with change in status. CM will continue to follow for any discharge needs.

## 2022-01-14 NOTE — PROGRESS NOTES
Pt much more lethargic throughout the day. Unable to really communicate well and unable to state name. ABG ordered and improved from this a.m. CT Head WO ordered. When arrived back from CT, pt had emesis episode with a large amount of dark brown, coffee ground emesis. Dr. Jen Lal notified and orders given for H/H, T&S, and GI Consult for tomorrow.

## 2022-01-15 PROBLEM — G93.40 ENCEPHALOPATHY: Status: ACTIVE | Noted: 2022-01-01

## 2022-01-15 PROBLEM — E87.0 HYPERNATREMIA: Status: ACTIVE | Noted: 2022-01-01

## 2022-01-15 PROBLEM — K92.0 HEMATEMESIS WITH NAUSEA: Status: ACTIVE | Noted: 2022-01-01

## 2022-01-15 NOTE — PROGRESS NOTES
[facility] Critical Care Note[de-identified] 1/15/2022  Leonard Eng  Admission Date: 1/11/2022     Length of Stay: 3 days    Background:  57 y.o. female presented after a falling right hip. She fell over a week ago and injuring her shoulder. She has been on norco. XR hip without acute process. She has a left humeral fracture that occurred while on [employer]. Not seen by ortho to date until here. Records were evaluated per ortho this admit and recommends CT left shoulder and outpatient followup with Dr. Mary Rausch. She is wearing a sling. She has underlying asthma, DM2, CVA, obesity, neuropathy, and  JESSICA on CPAP.      She has been hypoxic and CXR shows some atelectasis/infiltrates. + E coli UTI and she is on zosyn. Her BS was > 1000 - suspected DKA. Insulin drip initiated with hydration with improvement. Bicarb used with correction of acidosis. Notable PMH:  has a past medical history of Asthma, Diabetes mellitus ([facility] [address] Gastroparesis (06/28/2021), History of CVA (cerebrovascular accident), Neuropathy, Obesity, and Sleep apnea. 24 Hour events: Acidosis corrected. BS now 185 and almost off insulin drip. Not very alert. Off bipap at present. + e coli UTI.    ROS: unable to obtain/negative except as listed elsewhere. Lines: (insertion date)   ETT: (NA)  Nair: (1/13)  OGT: (NA)  Central line: (1/13)  Arterial Line (NA)    Drips: current dose (range)  D5W  Insulin        Pertinent Exam:         Blood pressure (!) 155/73, pulse (!) 104, temperature 99 °F (37.2 °C), resp. rate 20, height 5' 2\" (1.575 m), weight 220 lb (99.8 kg), SpO2 98 %. Intake/Output Summary (Last 24 hours) at 1/15/2022 1012  Last data filed at 1/15/2022 0520  Gross per 24 hour   Intake 5553.74 ml   Output 4875 ml   Net 678.74 ml     Constitutional: not very alert. Not able to communicate. Just repeats the word \"yes\" to all questions asked. EENMT:  Sclera clear, pupils equal, oral mucosa dry  Respiratory: clear from anterior. Wearing 2 liter cannula. Respirations not labored  Cardiovascular:  RRR - sinus per telemetry  Gastrointestinal:  soft with no evidence of tenderness; positive bowel sounds present  Musculoskeletal:  warm with no cyanosis, no lower extremity edema but her left hand is swollen. Wearing sling on left arm  Skin:  no jaundice or ecchymosis  Neurologic: opens eyes and answers \"yes\" to any question asked. + lethargy. Not able to follow any commands to interact otherwise  Psychiatric: cannot assess    CXR: none today  1/13      Recent Labs     01/15/22  0331 01/14/22 1818 01/14/22  0243 01/13/22  1442 01/13/22  0713 01/13/22  0713   WBC 17.0*  --  17.7*  --   --  17.1*   HGB 12.4 12.8 12.6  --    < > 14.0   HCT 38.4 39.2 39.7  --    < > 46.3   *  --  497*  --   --  521*   LAC  --   --  0.9 1.8  --   --     < > = values in this interval not displayed. Recent Labs     01/15/22  0850 01/15/22  0331 01/15/22  0053 01/14/22 1818 01/14/22  1818 01/14/22  1227 01/14/22  0838   * 152* 153*   < > 151*   < > 145   K 3.2* 2.7* 2.8*   < > 3.3*   < > 3.5   CL  --  117* 119*  --  119*   < > 116*   CO2  --  27 26  --  18*   < > 11*   GLU  --  164* 193*  --  223*   < > 268*   BUN  --  13 14  --  17   < > 20   CREA  --  0.70 0.79  --  0.82   < > 0.70   MG  --  2.5* 1.7*  --  1.7*   < > 2.8*   CA  --  9.0 8.7  --  8.9   < > 9.0   PHOS  --   --   --   --   --   --  2.2*    < > = values in this interval not displayed. Recent Labs     01/14/22 0243 01/13/22  1442   LAC 0.9 1.8     Recent Labs     01/15/22  0931 01/15/22  0829 01/15/22  0718   GLUCPOC 185* 171* 165*     ECHO: No results found for this or any previous visit.      Results     Procedure Component Value Units Date/Time    CULTURE, BLOOD [[id number]] Collected: 01/12/22 1249    Order Status: Completed Specimen: Blood Updated: 01/15/22 0713     Special Requests: --        RIGHT  FOREARM       Culture result: NO GROWTH 3 DAYS       CULTURE, BLOOD [[id number]] Collected: 01/12/22 0229    Order Status: Completed Specimen: Blood Updated: 01/15/22 0713     Special Requests: --        RIGHT  Antecubital       Culture result: NO GROWTH 3 DAYS       COVID-19 RAPID TEST [[id number]] Collected: 01/12/22 0227    Order Status: Completed Specimen: Nasopharyngeal Updated: 01/12/22 0320     Specimen source NASAL        COVID-19 rapid test Not detected        Comment:      The specimen is NEGATIVE for SARS-CoV-2, the novel coronavirus associated with COVID-19. A negative result does not rule out COVID-19. This test has been authorized by the FDA under an Emergency Use Authorization (EUA) for use by authorized laboratories.         Fact sheet for Healthcare Providers: ADVIZEco.nz  Fact sheet for Patients: [employer]nz       Methodology: Isothermal Nucleic Acid Amplification         CULTURE, URINE [[id number]]  (Abnormal)  (Susceptibility) Collected: 01/12/22 0227    Order Status: Completed Specimen: Urine from Clean catch Updated: 01/15/22 0733     Special Requests: NO SPECIAL REQUESTS        Culture result:       >100,000 COLONIES/mL ESCHERICHIA COLI                  50,000-100,000 COLONIES/mL NORMAL SKIN SNOW ISOLATED          Susceptibility      Escherichia coli     JAMIL     Ampicillin ($) Resistant     Ampicillin/sulbactam ($) Resistant     Aztreonam ($$$$) Susceptible     Cefazolin ($) Resistant     Cefepime ($$) Susceptible     Ceftriaxone ($) Susceptible     Gentamicin ($) Susceptible     Nitrofurantoin Susceptible     Piperacillin/Tazobac ($) Susceptible     Tobramycin ($) Susceptible     Trimeth-Sulfamethoxa Susceptible                  Linear View                       Inpat Anti-Infectives (From admission, onward)     Start     Ordered Stop    01/13/22 1800  nystatin (MYCOSTATIN) 100,000 unit/gram powder  Topical,   2 TIMES DAILY         01/13/22 1203 --    01/13/22 0900  piperacillin-tazobactam (ZOSYN) 3.375 g in 0.9% sodium chloride (MBP/ADV) 100 mL MBP  3.375 g,   IntraVENous,   EVERY 8 HOURS         01/13/22 0822 --    01/13/22 0400  azithromycin (ZITHROMAX) 500 mg in 0.9% sodium chloride 250 mL (VIAL-MATE)  500 mg,   IntraVENous,   EVERY 24 HOURS         01/12/22 0755 01/18/22 0359              Ventilator Settings:  Ideal body weight: 50.1 kg (110 lb 7.2 oz)   Mode FIO2 Rate Tidal Volume Pressure PEEP      25 %               Peak airway pressure:         Minute ventilation: 20.8 l/min  ABG:  Recent Labs     01/15/22  0353 01/14/22  1644 01/14/22  0938   PHI 7.50* 7.46* 7.36   PCO2I 32.2* 21.6* 12.8*   PO2I 96 81 117*   HCO3I 25.2 15.3* 7.2*     Assessment and Plan:  (Medical Decision Making)   Impression: 57 y.o. female with recent fall and left humeral fx. Wearing sling and scheduled for outpatient follow up with Dr. Zoe Camejo. BS > 1000 so insulin gtt initiated in the ICU. Required transfer to the ICU for tx of DKA/acidosis. Required bipap short term due to mental status. Hospitalist managing DM. CT head on 1/14 negative. + e coli UTI - on Zosyn. NEURO:  Arousable and able to say one word but no converse. Not able to follow commands either. Recent head CT negative. Avoid any sedation and hopefully she will improve neurologically now that UTI being treated and BS down. Sedation: NA  Analgesia: NA  Paralytics: NA  CV:   Volume Status: + 678 over past 24 hours. IV fluids using D5W with recent DKA - now improving. Septic shock: NA  PULM:   Acute hypoxemic/hypercapneic respiratory failure:  Was on bipap for short period of time. Acidosis corrected and now off. Using 2 liters oxygen for support. CXR with some atx/infiltrates. JESSICA: uses home CPAP. CO2 only 32. Using our bipap here but can have family bring her home CPAP and use this when available  RENAL:  PAVITHRA: NA  Lactic acidosis: NA  Electrolytes: Na elevated - on D5W. K+ supplemented  GI:   Nutrition: none at present.  Will need to hold po diet until she is more alert  HEME:   Anemia:NA  Thrombocytopenia: NA  Anticoagulation: low dose lovenox  ID:   E coli UTI - on zosyn which organism is sensitive to. Also using Zithromax with infiltrates on CXR - ? PNA  ENDO:   DM:  now. Almost off insulin gtt. hospiatlist managing. Skin: no decub, turns, preventive care  Prophy: lovenox/protonix        Full Code      Macy Mcdonald NP   I have spoken with and examined the patient. I agree with the above assessment and plan as documented.     Gen: wakes up - non verbal and does not follow commands  Lungs:  clear  Heart:  RRR with no Murmur/Rubs/Gallops  Abd:obese  Ext: no edema    Acidosis is corrected, still very confused -    Richelle Rutherford MD

## 2022-01-15 NOTE — CONSULTS
Infectious Disease Consult    Today's Date: 1/15/2022   Admit Date: 1/11/2022    Impression:   · E coli UTI (1/12/22); on Zosyn  · Acute respiratory failure with hypoxia; COVID 19 negative; Pulmonology following; 98 % on 3 lpm via nasal cannula  · Abnormal sinus CT; fungal etiology? · Asthma  · Type II DM; A1c 7.0  · CVA  · Obesity  · Left humeral head and neck fracture   · Tachycardia  · Stress Gastritis; coffee ground emesis; seen by GI  · Leukocytosis  · Norco overuse reported by EMS; found hypoxic at home by EMS; ? Aspiration vs oversedation    Plan:   · Continue Zosyn for now  · Stop Diflucan and Zithromax  · Consider ENT consultation    Anti-infectives:   · IV Zosyn (1/13-  · IV Zithromax (1/13-  · IV Diflucan (1/15-    Subjective:   Date of Consultation:  January 15, 2022  Referring Physician: Kevin Mendenhall MD     Patient is a 57 y.o. female  with PMH of asthma, DM2, CVA, obesity, neuropathy, JESSICA on CPAP admitted s/p fall with recent left humeral fracture that occurred while on [employer]. Not seen by ortho to date. Records were Evaluated per ortho this admit and recommends CT left shoulder and outpatient followup with Dr. Miles Lindquist. Found hypoxic and meets sepsis criteria due to fever / leukocytosis. Has CXR showing LLL pneumonia and UTI. COVID negative. She is on Zithromax and Zosyn. Started on Diflucan today. CT head yesterday showed: RIGHT MAXILLARY AND FRONTAL AND LEFT SPHENOID SINUSITIS WITH  HIGH-ATTENUATION COMPONENTS IN THE MAXILLARY SINUS SUGGESTING THE POSSIBILITY OF A FUNGAL ETIOLOGY. Initial CXR 1/11 showed; Mild left lower lobe atelectasis or pneumonia. Humeral head and neck fracture seen on XR L shoulder. ID is asked to evaluate patient for possible fungal sinus infection. Urine cx grew E coli and urine looked infected. Blood cx NGTD.       Patient Active Problem List   Diagnosis Code    Pneumonia J18.9    UTI (urinary tract infection) N39.0    Hypoxia R09.02    Obesity E66.9    Closed fracture of surgical neck of humerus S42.213A    Acute respiratory failure with hypoxia (HCC) J96.01    Sepsis (HCC) S08.8    Metabolic acidosis S88.2    Hematemesis with nausea K92.0    Hypernatremia E87.0    Encephalopathy G93.40     Past Medical History:   Diagnosis Date    Asthma     Diabetes mellitus ([address]     Gastroparesis 2021    G[facility] at [address] [facility]    History of CVA (cerebrovascular accident)     Neuropathy     Obesity     Sleep apnea       Family History   Problem Relation Age of Onset    Cancer Mother     COPD Father       Social History     Tobacco Use    Smoking status: Passive Smoke Exposure - Never Smoker    Smokeless tobacco: Never Used   Substance Use Topics    Alcohol use: Never     Past Surgical History:   Procedure Laterality Date    HX BREAST BIOPSY      HX  SECTION      HX COLONOSCOPY  2018    HX REFRACTIVE SURGERY      DC EGD DELIVER THERMAL ENERGY SPHNCTR/CARDIA GERD  2021     and sood dilation    DC EGD DELIVER THERMAL ENERGY SPHNCTR/CARDIA GERD  2021      Prior to Admission medications    Medication Sig Start Date End Date Taking? Authorizing Provider   naloxone (Narcan) 4 mg/actuation nasal spray Use 1 spray intranasally, then discard. Repeat with new spray every 2 min as needed for opioid overdose symptoms, alternating nostrils. 22  Yes Barbara Green MD   lisinopriL (PRINIVIL, ZESTRIL) 20 mg tablet Take 20 mg by mouth daily. Yes Provider, Historical   montelukast (Singulair) 10 mg tablet Take 10 mg by mouth daily. Yes Provider, Historical   aspirin delayed-release 81 mg tablet Take 81 mg by mouth daily. Yes Provider, Historical   amitriptyline (ELAVIL) 75 mg tablet Take 25 mg by mouth nightly as needed for Sleep. Yes Provider, Historical   sertraline (Zoloft) 100 mg tablet Take 100 mg by mouth daily. Yes Provider, Historical   pregabalin (Lyrica) 300 mg capsule Take 300 mg by mouth two (2) times a day.    Yes Provider, Historical   empagliflozin (JARDIANCE) 10 mg tablet Take 10 mg by mouth daily. Yes Provider, Historical   fluticasone propion-salmeteroL (Advair HFA) 115-21 mcg/actuation inhaler Take 2 Puffs by inhalation two (2) times a day. Yes Provider, Historical   fluticasone propionate (Flonase Allergy Relief) 50 mcg/actuation nasal spray 2 Sprays by Both Nostrils route daily. Yes Provider, Historical   omeprazole (PRILOSEC) 20 mg capsule Take 20 mg by mouth daily. Yes Provider, Historical   amLODIPine (Norvasc) 5 mg tablet Take 5 mg by mouth daily. Yes Provider, Historical   atorvastatin (Lipitor) 20 mg tablet Take 20 mg by mouth daily. Yes Provider, Historical   meclizine (ANTIVERT) 25 mg tablet Take 1 Tab by mouth three (3) times daily as needed for Dizziness or Nausea for up to 20 doses. 3/23/20   Marta Ramirez, DO   ondansetron (ZOFRAN ODT) 4 mg disintegrating tablet Take 1 Tab by mouth every eight (8) hours as needed for Nausea. 3/23/20   Marta Ramirez, DO       Allergies   Allergen Reactions    Other Food Swelling     Shell fish    Contrast Agent [Iodine] Anaphylaxis    Banana Itching and Swelling     Mouth itches and swells        Review of Systems:  A comprehensive review of systems was negative except for that written in the History of Present Illness. Objective:     Visit Vitals  BP (!) 155/73   Pulse (!) 104   Temp 99 °F (37.2 °C)   Resp 20   Ht 5' 2\" (1.575 m)   Wt 99.8 kg (220 lb)   SpO2 98%   BMI 40.24 kg/m²     Temp (24hrs), Av.5 °F (36.9 °C), Min:97.9 °F (36.6 °C), Max:99 °F (37.2 °C)       Lines:  Central Venous Catheter:       Physical Exam:    General:  Lethargic, well noursished, well developed, appears stated age   Eyes:  Sclera anicteric. Pupils equally round and reactive to light.    Mouth/Throat: Mucous membranes dry, dry blood oral cavity, poor dentition   Neck: Supple   Lungs:   Clear to auscultation bilaterally, good effort   CV:  Regular rate and rhythm,no murmur, click, rub or gallop   Abdomen:   Soft, non-tender. bowel sounds normal. non-distended   Extremities: No cyanosis, bilateral LE edema   Skin: Skin color, texture, turgor normal. no acute rash or lesions   Lymph nodes: Cervical and supraclavicular normal   Musculoskeletal: No swelling or deformity   Lines/Devices:  Intact, no erythema, drainage or tenderness   Psych: Awake and lethargic; no response to command; no verbal responses       Data Review:     CBC:  Recent Labs     01/15/22  0331 01/14/22  1818 01/14/22  0243 01/13/22  0713 01/13/22  0713   WBC 17.0*  --  17.7*  --  17.1*   HGB 12.4 12.8 12.6   < > 14.0   HCT 38.4 39.2 39.7   < > 46.3   *  --  497*  --  521*    < > = values in this interval not displayed. BMP:  Recent Labs     01/15/22  0850 01/15/22  0331 01/15/22  0053 01/14/22 1818 01/14/22 1818   CREA  --  0.70 0.79  --  0.82   BUN  --  13 14  --  17   * 152* 153*   < > 151*   K 3.2* 2.7* 2.8*   < > 3.3*   CL  --  117* 119*  --  119*   CO2  --  27 26  --  18*   AGAP  --  8 8  --  14   GLU  --  164* 193*  --  223*    < > = values in this interval not displayed. LFTS:  No results for input(s): TBILI, ALT, AP, TP, ALB in the last 72 hours.     No lab exists for component: SGOT    Microbiology:     All Micro Results     Procedure Component Value Units Date/Time    FUNGUS CULTURE AND SMEAR [[id number]]     Order Status: Sent Specimen: Other     FUNGUS CULTURE AND SMEAR [[id number]]     Order Status: Sent Specimen: Other     CULTURE, URINE [[id number]]  (Abnormal)  (Susceptibility) Collected: 01/12/22 0227    Order Status: Completed Specimen: Urine from Clean catch Updated: 01/15/22 0733     Special Requests: NO SPECIAL REQUESTS        Culture result:       >100,000 COLONIES/mL ESCHERICHIA COLI                  50,000-100,000 COLONIES/mL NORMAL SKIN SNOW ISOLATED          CULTURE, BLOOD [[id number]] Collected: 01/12/22 0229    Order Status: Completed Specimen: Blood Updated: 01/15/22 0713     Special Requests: --        RIGHT  Antecubital       Culture result: NO GROWTH 3 DAYS       CULTURE, BLOOD [[id number]] Collected: 01/12/22 1249    Order Status: Completed Specimen: Blood Updated: 01/15/22 0713     Special Requests: --        RIGHT  FOREARM       Culture result: NO GROWTH 3 DAYS       COVID-19 RAPID TEST [[id number]] Collected: 01/12/22 0227    Order Status: Completed Specimen: Nasopharyngeal Updated: 01/12/22 0320     Specimen source NASAL        COVID-19 rapid test Not detected        Comment:      The specimen is NEGATIVE for SARS-CoV-2, the novel coronavirus associated with COVID-19. A negative result does not rule out COVID-19. This test has been authorized by the FDA under an Emergency Use Authorization (EUA) for use by authorized laboratories.         Fact sheet for Healthcare Providers: ConventionUpdate.co.nz  Fact sheet for Patients: ConventionUpdate.co.nz       Methodology: Isothermal Nucleic Acid Amplification               Imaging:   Reviewed and outlined in H&P    Signed By: Mauro Chowdhury NP     January 15, 2022

## 2022-01-15 NOTE — PROGRESS NOTES
Bedside and verbal shift change report received from  Daryle Mallet, [address] (offgoing nurse). Report included the following information SBAR, Kardex, ED Summary, OR Summary, Procedure Summary, Intake/Output, MAR, Recent Results, Med Rec Status, Cardiac Rhythm ST, Alarm Parameters , Pre Procedure Checklist, Procedure Verification and Quality Measures. Dual skin assessment completed at bedside: Abrasions to bilateral knees from falls; no pressure injury (list pertinent skin assessment findings)    Dual verification of gtts completed (name of gtts verified):  Insulin gtt

## 2022-01-15 NOTE — PROGRESS NOTES
Hospitalist Progress Note   Admit Date:  2022 10:04 PM   Name:  Leonard Lowery   Age:  57 y.o. Sex:  female  :  1960   MRN:  [id number]   Room:  [facility]    Presenting Complaint: Fall    Reason(s) for Admission: Pneumonia [J18.9]     Hospital Course & Interval History:     Ms. Juliette Goldmann is a 58 yo female with PMH of asthma, DM2, CVA, obesity, neuropathy, JESSICA on CPAP admitted s/p fall with recent left humeral fracture that occurred [place] [employer]. Not seen by ortho to date. Records were Evaluated per ortho this admit and recommends CT left shoulder and outpatient followup with Dr. Mary Rausch. Found hypoxic and meets sepsis criteria due to fever / leukocytosis. Has CXR showing LLL pneumonia and UTI. COVID negative. She is on course of antibiotics. Discharge plans pending. 2022  Called with bicarb of 6 and increased respirations, complaints of shortness of breath and back pain, thirsty, ROS limited due to urgent issues     Subjective (01/15/22): Patient seen and evaluated. History as above  Transferred to the ICU on 2022 for acidosis found to be in DKA  Seen by pulmonology today and started on glucose stabilizer  Anion gap has since closed  She was on BiPAP but now has been weaned to nasal cannula  She still has altered mental status and is minimally responsive  CT head was done for concern for confusion and showed no strokes but there is a concern for a fungal right maxillary sinus infection  She has been started on fluconazole  Infectious disease has been consulted  Coffee-ground emesis overnight started on Protonix and GI consulted   at bedside all questions answered. He states that he will not be able to come in given the impending weather but would appreciate follow-up phone calls and he will be back on Monday. The patient has a daughter who may show up and it is okay to give her information.         Assessment & Plan:         Acute metabolic acidosis- new on 1-13-22   · Bicarb 6 on BMP  · STAT ABG and BMP, lactate ordered  · 2 amps bicarb ordered  · Repeat ABG in 2 hours   · O2 ok on 4 L NC  · STAT CXR reordered  · CTA chest when more stable  · Expand antibiotics to vancomycin and zosyn  · May need ICU transfer pending course, ICU [place] RN aware of case and will assist monitoring     1/15/2022   Resolved  Discontinue bicarb drip  Switch to D5W given the hyper natremia  Sodiums every 6  Transition to subcu insulin per protocol  Plan for vq scan when more medically stable        Principal Problem:    Acute respiratory failure with hypoxia ([facility] [address] (1/12/2022)  Active Problems:    Pneumonia (1/12/2022)    Sepsis:  · D4 antibiotics, continue vancomycin/ zosyn  · Wean O2 as tolerant, currently on high flow nasal cannula  · followup cultures -no growth to date  · CT head done yesterday is concerning for right maxillary sinus fungal infection  · Fluconazole ordered  · Infectious disease consult ordered we will follow-up  · If mental status does not improve may need a MRI brain            UTI (urinary tract infection) (1/12/2022)  · D4 antibiotics  · followup cultures -E. coli sensitive to Zosyn            Obesity (1/12/2022)         Closed fracture of surgical neck of humerus (1/12/2022)  ·   Outpatient ortho  · Supportive care   · PT,OT      Prior CVA:  · Continued asa, lipitor      DM2:  · Continue SSI    Coffee-ground emesis  Continue Protonix  Carafate to be added when she is taking p.o. GI input appreciated        Dispo/Discharge Planning:    TBD    Diet: N.p.o. for now given altered mental status  DVT PPx: lovenox  Code status: Full Code    Hospital Problems as of 1/15/2022 Never Reviewed          Codes Class Noted - Resolved POA    Metabolic acidosis HRV-17-SJ: E87.2  ICD-9-CM: 276.2  1/13/2022 - Present No        Pneumonia ICD-10-CM: J18.9  ICD-9-CM: 362  1/12/2022 - Present Yes        UTI (urinary tract infection) ICD-10-CM: N39.0  ICD-9-CM: 599.0  1/12/2022 - Present Yes        Hypoxia ICD-10-CM: R09.02  ICD-9-CM: 799.02  1/12/2022 - Present Yes        Obesity (Chronic) ICD-10-CM: E66.9  ICD-9-CM: 278.00  1/12/2022 - Present Yes        Closed fracture of surgical neck of humerus ICD-10-CM: S42.213A  ICD-9-CM: 812.01  1/12/2022 - Present Yes        Acute respiratory failure with hypoxia [facility]) ICD-10-CM: J96.01  ICD-9-CM: 518.81  1/12/2022 - Present Yes        * (Principal) Sepsis ([facility] [address] ICD-10-CM: A41.9  ICD-9-CM: 038.9, 995.91  1/12/2022 - Present Yes              Objective:     Patient Vitals for the past 24 hrs:   Temp Pulse Resp BP SpO2   01/15/22 0740 99 °F (37.2 °C) (!) 104 26 (!) 127/59 97 %   01/15/22 0720  (!) 101  (!) 126/58 98 %   01/15/22 0700  (!) 104  139/64 99 %   01/15/22 0645  100  117/75 97 %   01/15/22 0630  (!) 104  139/64 99 %   01/15/22 0615  (!) 101   98 %   01/15/22 0600  100  (!) 148/80 97 %   01/15/22 0545  (!) 102  (!) 152/67 99 %   01/15/22 0530  (!) 104   99 %   01/15/22 0515  (!) 101  (!) 154/68 98 %   01/15/22 0500  (!) 101  (!) 146/65 97 %   01/15/22 0445  (!) 104  (!) 142/65 96 %   01/15/22 0430  (!) 104   99 %   01/15/22 0415  (!) 104  138/64 98 %   01/15/22 0400  (!) 104  (!) 154/67 98 %   01/15/22 0345  (!) 104  (!) 149/72 98 %   01/15/22 0330  (!) 101  (!) 151/73 96 %   01/15/22 0315  (!) 101   96 %   01/15/22 0300 98.7 °F (37.1 °C) 100 26 138/65 95 %   01/15/22 0245  (!) 102  (!) 154/65 96 %   01/15/22 0230  (!) 103   96 %   01/15/22 0215  (!) 107  (!) 149/65 98 %   01/15/22 0200  (!) 108  (!) 151/67 98 %   01/15/22 0145  (!) 106  135/78 96 %   01/15/22 0130  (!) 110   98 %   01/15/22 0115  (!) 107  136/65 98 %   01/15/22 0100  (!) 109  (!) 149/82 99 %   01/15/22 0045  (!) 107  (!) 150/70 98 %   01/15/22 0030  (!) 111   98 %   01/15/22 0015  (!) 109  (!) 155/70 99 %   01/15/22 0000  (!) 111  (!) 146/71 98 %   01/14/22 2345  (!) 108  (!) 151/71 98 %   01/14/22 2330  (!) 112  139/65 98 %   01/14/22 2315  (!) 111  (!) 167/66 100 %   01/14/22 2300 98 °F (36.7 °C) (!) 111 28 (!) 141/63 98 %   01/14/22 2245  (!) 110  139/60 98 %   01/14/22 2230  (!) 112   99 %   01/14/22 2215  (!) 110  (!) 143/71 98 %   01/14/22 2200  (!) 111  (!) 153/72 97 %   01/14/22 2145  (!) 110  (!) 141/70 99 %   01/14/22 2130  (!) 112   98 %   01/14/22 2115  (!) 115  (!) 143/67 99 %   01/14/22 2100  (!) 115  (!) 145/71 98 %   01/14/22 2045  (!) 112  (!) 155/78 99 %   01/14/22 2030  (!) 118   98 %   01/14/22 2015  (!) 115   99 %   01/14/22 2000  (!) 117  (!) 150/67 100 %   01/14/22 1945  (!) 119  130/62 100 %   01/14/22 1930  (!) 117   100 %   01/14/22 1915  (!) 117  (!) 157/67 100 %   01/14/22 1900 98.9 °F (37.2 °C) (!) 116 20 (!) 142/65 100 %   01/14/22 1845  (!) 116  126/77 99 %   01/14/22 1815  (!) 117  (!) 153/69 98 %   01/14/22 1800  (!) 126  (!) 149/71 99 %   01/14/22 1745    129/68    01/14/22 1715  (!) 117  (!) 140/65 97 %   01/14/22 1700  (!) 118  (!) 151/74 99 %   01/14/22 1645  (!) 119  (!) 147/65 97 %   01/14/22 1630  (!) 122   97 %   01/14/22 1615  (!) 122  (!) 142/67 97 %   01/14/22 1600  (!) 121  (!) 143/67 96 %   01/14/22 1545  (!) 120  (!) 142/74 99 %   01/14/22 1515 97.9 °F (36.6 °C) (!) 120 17 (!) 148/70 97 %   01/14/22 1500  (!) 125  (!) 147/75 97 %   01/14/22 1445  (!) 120  (!) 144/65 97 %   01/14/22 1430  (!) 121   97 %   01/14/22 1415  (!) 118  (!) 150/83 96 %   01/14/22 1400  (!) 122  (!) 165/102 98 %   01/14/22 1345  (!) 121  (!) 149/88 97 %   01/14/22 1330  (!) 119   97 %   01/14/22 1315  (!) 120  (!) 143/81 97 %   01/14/22 1300  (!) 122  (!) 143/74 98 %   01/14/22 1245  (!) 119  (!) 148/70 97 %   01/14/22 1230  (!) 120  (!) 155/65 97 %   01/14/22 1215  (!) 120   96 %   01/14/22 1200  (!) 122  (!) 142/76 98 %   01/14/22 1145  (!) 121  139/74 98 %   01/14/22 1130  (!) 123   98 %   01/14/22 1115 99.4 °F (37.4 °C) (!) 124 22 (!) 152/68 97 %   01/14/22 1100  (!) 123  (!) 143/80 97 %   01/14/22 1045  (!) 124  114/69 99 %   01/14/22 1030  (!) 121   98 %   01/14/22 1015  (!) 124  131/82 98 %   01/14/22 1000  (!) 126  (!) 144/76 97 %   01/14/22 0945  (!) 125  133/75 100 %   01/14/22 0930  (!) 123   97 %   01/14/22 0915  (!) 122  (!) 150/76 97 %   01/14/22 0900  (!) 121  (!) 149/83 98 %   01/14/22 0859     98 %   01/14/22 0845  (!) 121  (!) 134/94 98 %     Oxygen Therapy  O2 Sat (%): 97 % (01/15/22 0740)  Pulse via Oximetry: 105 beats per minute (01/15/22 0740)  O2 Device: Nasal cannula (01/15/22 0740)  Skin Assessment: Clean, dry, & intact (01/15/22 0300)  Skin Protection for O2 Device: Yes (01/15/22 0300)  Orientation: Middle (01/14/22 1900)  Location: Cheek (01/14/22 1900)  Interventions: Mouth Care (01/15/22 0300)  O2 Flow Rate (L/min): 3 l/min (01/15/22 0740)  FIO2 (%): 25 % (01/14/22 1115)    Estimated body mass index is 40.24 kg/m² as calculated from the following:    Height as of this encounter: 5' 2\" (1.575 m). Weight as of this encounter: 99.8 kg (220 lb). Intake/Output Summary (Last 24 hours) at 1/15/2022 0815  Last data filed at 1/15/2022 0520  Gross per 24 hour   Intake 5553.74 ml   Output 4875 ml   Net 678.74 ml         Physical Exam:     Blood pressure (!) 127/59, pulse (!) 104, temperature 99 °F (37.2 °C), resp. rate 26, height 5' 2\" (1.575 m), weight 99.8 kg (220 lb), SpO2 97 %. General:    Well nourished. , elderly, increased respiratory rate with increased work of breathing   CV:   RRR. No m/r/g. No jugular venous distension. No edema   Lungs:     Coarse   Abdomen: Bowel sounds present. Soft, nontender, nondistended. Extremities: No cyanosis or clubbing. No edema  Skin:     No rashes and normal coloration. Warm and dry. Neuro:   grossly intact.     Psych:  anxious    I have reviewed ordered lab tests and independently visualized imaging below:    Recent Labs:  Recent Results (from the past 48 hour(s))   BLOOD GAS, ARTERIAL POC    Collection Time: 01/13/22  8:30 AM   Result Value Ref Range    pH (POC) 7.11 (LL) 7.35 - 7.45      pCO2 (POC) 13.0 (LL) 35 - 45 MMHG    pO2 (POC) 112 (H) 75 - 100 MMHG    HCO3 (POC) 4.1 (L) 22 - 26 MMOL/L    sO2 (POC) 96.7 95 - 98 %    Base deficit (POC) 23.0 mmol/L    Allens test (POC) Positive      Site RIGHT RADIAL      Specimen type (POC) ARTERIAL      Performed by Eduardo     FIO2, L/min 2     BLOOD GAS, ARTERIAL POC    Collection Time: 01/13/22 12:08 PM   Result Value Ref Range    Device: BIPAP MASK      FIO2 (POC) 30 %    pH (POC) 7.12 (LL) 7.35 - 7.45      pCO2 (POC) 14.5 (LL) 35 - 45 MMHG    pO2 (POC) 142 (H) 75 - 100 MMHG    HCO3 (POC) 4.7 (L) 22 - 26 MMOL/L    sO2 (POC) 98.3 (H) 95 - 98 %    Base deficit (POC) 22.4 mmol/L    Mode BILEVEL      PEEP/CPAP (POC) 8 cmH2O    PIP (POC) 16      Pressure support 14 cmH2O    Allens test (POC) Positive      Inspiratory Time 0.9 sec    Total resp.  rate 34      Site LEFT RADIAL      Specimen type (POC) ARTERIAL      Performed by Gail     CO2, POC 6 (LL) 13 - 23 MMOL/L    Critical value read back OLIVARES     Respiratory comment: [id number]    PLEASE READ & DOCUMENT PPD TEST IN 24 HRS    Collection Time: 01/13/22  2:04 PM   Result Value Ref Range    PPD Negative Negative    mm Induration 0 0 - 5 mm   PLEASE READ & DOCUMENT PPD TEST IN 48 HRS    Collection Time: 01/13/22  2:04 PM   Result Value Ref Range    PPD      mm Induration     LACTIC ACID    Collection Time: 01/13/22  2:42 PM   Result Value Ref Range    Lactic acid 1.8 0.4 - 2.0 MMOL/L   METABOLIC PANEL, BASIC    Collection Time: 01/13/22  2:51 PM   Result Value Ref Range    Sodium 138 136 - 145 mmol/L    Potassium 4.6 3.5 - 5.1 mmol/L    Chloride 106 98 - 107 mmol/L    CO2 7 (LL) 21 - 32 mmol/L    Anion gap 25 (H) 7 - 16 mmol/L    Glucose 317 (H) 65 - 100 mg/dL    BUN 31 (H) 8 - 23 MG/DL    Creatinine 0. 90 0.6 - 1.0 MG/DL    GFR est AA >60 >60 ml/min/1.73m2    GFR est non-AA >60 >60 ml/min/1.73m2    Calcium 9.5 8.3 - 10.4 MG/DL   GLUCOSE, POC    Collection Time: 01/13/22  4:05 PM   Result Value Ref Range    Glucose (POC) 280 (H) 65 - 100 mg/dL    Performed by Kaden    METABOLIC PANEL, BASIC    Collection Time: 01/13/22  9:29 PM   Result Value Ref Range    Sodium 144 136 - 145 mmol/L    Potassium 3.5 3.5 - 5.1 mmol/L    Chloride 113 (H) 98 - 107 mmol/L    CO2 10 (L) 21 - 32 mmol/L    Anion gap 21 (H) 7 - 16 mmol/L    Glucose 253 (H) 65 - 100 mg/dL    BUN 27 (H) 8 - 23 MG/DL    Creatinine 0.94 0.6 - 1.0 MG/DL    GFR est AA >60 >60 ml/min/1.73m2    GFR est non-AA >60 >60 ml/min/1.73m2    Calcium 9.3 8.3 - 10.4 MG/DL   GLUCOSE, POC    Collection Time: 01/13/22 10:06 PM   Result Value Ref Range    Glucose (POC) 228 (H) 65 - 100 mg/dL    Performed by Guille Scott    METABOLIC PANEL, BASIC    Collection Time: 01/14/22  2:43 AM   Result Value Ref Range    Sodium 144 136 - 145 mmol/L    Potassium 3.1 (L) 3.5 - 5.1 mmol/L    Chloride 113 (H) 98 - 107 mmol/L    CO2 12 (L) 21 - 32 mmol/L    Anion gap 19 (H) 7 - 16 mmol/L    Glucose 250 (H) 65 - 100 mg/dL    BUN 25 (H) 8 - 23 MG/DL    Creatinine 0.84 0.6 - 1.0 MG/DL    GFR est AA >60 >60 ml/min/1.73m2    GFR est non-AA >60 >60 ml/min/1.73m2    Calcium 8.9 8.3 - 10.4 MG/DL   CBC W/O DIFF    Collection Time: 01/14/22  2:43 AM   Result Value Ref Range    WBC 17.7 (H) 4.3 - 11.1 K/uL    RBC 4.65 4.05 - 5.2 M/uL    HGB 12.6 11.7 - 15.4 g/dL    HCT 39.7 35.8 - 46.3 %    MCV 85.4 79.6 - 97.8 FL    MCH 27.1 26.1 - 32.9 PG    MCHC 31.7 31.4 - 35.0 g/dL    RDW 14.7 (H) 11.9 - 14.6 %    PLATELET 914 (H) 647 - 450 K/uL    MPV 8.4 (L) 9.4 - 12.3 FL    ABSOLUTE NRBC 0.00 0.0 - 0.2 K/uL   LACTIC ACID    Collection Time: 01/14/22  2:43 AM   Result Value Ref Range    Lactic acid 0.9 0.4 - 2.0 MMOL/L   METABOLIC PANEL, BASIC    Collection Time: 01/14/22  8:38 AM   Result Value Ref Range    Sodium 145 136 - 145 mmol/L    Potassium 3.5 3.5 - 5.1 mmol/L    Chloride 116 (H) 98 - 107 mmol/L    CO2 11 (L) 21 - 32 mmol/L    Anion gap 18 (H) 7 - 16 mmol/L    Glucose 268 (H) 65 - 100 mg/dL    BUN 20 8 - 23 MG/DL    Creatinine 0.70 0.6 - 1.0 MG/DL    GFR est AA >60 >60 ml/min/1.73m2    GFR est non-AA >60 >60 ml/min/1.73m2    Calcium 9.0 8.3 - 10.4 MG/DL   MAGNESIUM    Collection Time: 01/14/22  8:38 AM   Result Value Ref Range    Magnesium 2.8 (H) 1.8 - 2.4 mg/dL   PHOSPHORUS    Collection Time: 01/14/22  8:38 AM   Result Value Ref Range    Phosphorus 2.2 (L) 2.3 - 3.7 MG/DL   HEMOGLOBIN A1C WITH EAG    Collection Time: 01/14/22  8:38 AM   Result Value Ref Range    Hemoglobin A1c 7.0 (H) 4.20 - 6.30 %    Est. average glucose 154 mg/dL   GLUCOSE, POC    Collection Time: 01/14/22  8:48 AM   Result Value Ref Range    Glucose (POC) 244 (H) 65 - 100 mg/dL    Performed by Kaden    BLOOD GAS, ARTERIAL POC    Collection Time: 01/14/22  9:38 AM   Result Value Ref Range    Device: BIPAP MASK      FIO2 (POC) 24 %    pH (POC) 7.36 7.35 - 7.45      pCO2 (POC) 12.8 (LL) 35 - 45 MMHG    pO2 (POC) 117 (H) 75 - 100 MMHG    HCO3 (POC) 7.2 (L) 22 - 26 MMOL/L    sO2 (POC) 98.6 (H) 95 - 98 %    Base deficit (POC) 15.1 mmol/L    Mode Non Invasive      PEEP/CPAP (POC) 8 cmH2O    Allens test (POC) Positive      Site RIGHT RADIAL      Specimen type (POC) ARTERIAL      Performed by Lakhani (Wallace)     Critical value read back PIERO     Respiratory comment: ve26.9    GLUCOSE, POC    Collection Time: 01/14/22 10:15 AM   Result Value Ref Range    Glucose (POC) 294 (H) 65 - 100 mg/dL    Performed by Meaghan Kent    Collection Time: 01/14/22 10:16 AM   Result Value Ref Range    Glucose 294 mg/dL    Insulin order 4.7 units/hour    Insulin adminstered 4.7 units/hour    Multiplier 0.020     Low target 150 mg/dL    High target 250 mg/dL    D50 order 0.0 ml    D50 administered 0.00 ml Minutes until next BG 60 min    Order initials MICHELE     Administered initials Quilla Dapper Comments     GLUCOSE, POC    Collection Time: 01/14/22 11:15 AM   Result Value Ref Range    Glucose (POC) 249 (H) 65 - 100 mg/dL    Performed by Yanique Murphy    Collection Time: 01/14/22 11:16 AM   Result Value Ref Range    Glucose 249 mg/dL    Insulin order 3.8 units/hour    Insulin adminstered 3.8 units/hour    Multiplier 0.020     Low target 150 mg/dL    High target 250 mg/dL    D50 order 0.0 ml    D50 administered 0.00 ml    Minutes until next BG 60 min    Order initials MICHELE     Administered initials Quilla Dapper Comments     GLUCOSE, POC    Collection Time: 01/14/22 12:18 PM   Result Value Ref Range    Glucose (POC) 225 (H) 65 - 100 mg/dL    Performed by Chalino Mckinley    Collection Time: 01/14/22 12:19 PM   Result Value Ref Range    Glucose 225 mg/dL    Insulin order 3.3 units/hour    Insulin adminstered 3.3 units/hour    Multiplier 0.020     Low target 150 mg/dL    High target 250 mg/dL    D50 order 0.0 ml    D50 administered 0.00 ml    Minutes until next BG 60 min    Order initials TW     Administered initials TW     GLSCOM Comments     METABOLIC PANEL, BASIC    Collection Time: 01/14/22 12:27 PM   Result Value Ref Range    Sodium 148 (H) 136 - 145 mmol/L    Potassium 3.0 (L) 3.5 - 5.1 mmol/L    Chloride 119 (H) 98 - 107 mmol/L    CO2 12 (L) 21 - 32 mmol/L    Anion gap 17 (H) 7 - 16 mmol/L    Glucose 259 (H) 65 - 100 mg/dL    BUN 18 8 - 23 MG/DL    Creatinine 0.86 0.6 - 1.0 MG/DL    GFR est AA >60 >60 ml/min/1.73m2    GFR est non-AA >60 >60 ml/min/1.73m2    Calcium 9.2 8.3 - 10.4 MG/DL   MAGNESIUM    Collection Time: 01/14/22 12:27 PM   Result Value Ref Range    Magnesium 1.8 1.8 - 2.4 mg/dL   GLUCOSE, POC    Collection Time: 01/14/22  1:21 PM   Result Value Ref Range    Glucose (POC) 209 (H) 65 - 100 mg/dL    Performed by [employer] Time: 01/14/22  1:22 PM   Result Value Ref Range    Glucose 209 mg/dL    Insulin order 3.0 units/hour    Insulin adminstered 3.0 units/hour    Multiplier 0.020     Low target 150 mg/dL    High target 250 mg/dL    D50 order 0.0 ml    D50 administered 0.00 ml    Minutes until next BG 60 min    Order initials TW     Administered initials TW     GLSCOM Comments     GLUCOSE, POC    Collection Time: 01/14/22  2:29 PM   Result Value Ref Range    Glucose (POC) 205 (H) 65 - 100 mg/dL    [facility]    Collection Time: 01/14/22  2:31 PM   Result Value Ref Range    Glucose 205 mg/dL    Insulin order 2.9 units/hour    Insulin adminstered 2.9 units/hour    Multiplier 0.020     Low target 150 mg/dL    High target 250 mg/dL    D50 order 0.0 ml    D50 administered 0.00 ml    Minutes until next BG 60 min    Order initials MICHELE     Administered initials MICHELE     GLSCOM Comments     GLUCOSE, POC    Collection Time: 01/14/22  3:33 PM   Result Value Ref Range    Glucose (POC) 203 (H) 65 - 100 mg/dL    [facility]    Collection Time: 01/14/22  3:34 PM   Result Value Ref Range    Glucose 203 mg/dL    Insulin order 2.9 units/hour    Insulin adminstered 2.9 units/hour    Multiplier 0.020     Low target 150 mg/dL    High target 250 mg/dL    D50 order 0.0 ml    D50 administered 0.00 ml    Minutes until next BG 60 min    Order initials MICHELE     Administered initials MICHELE     GLSCOM Comments     GLUCOSE, POC    Collection Time: 01/14/22  4:37 PM   Result Value Ref Range    Glucose (POC) 219 (H) 65 - 100 mg/dL    [facility]    Collection Time: 01/14/22  4:38 PM   Result Value Ref Range    Glucose 219 mg/dL    Insulin order 3.2 units/hour    Insulin adminstered 3.2 units/hour    Multiplier 0.020     Low target 150 mg/dL    High target 250 mg/dL    D50 order 0.0 ml    D50 administered 0.00 ml    Minutes until next BG 60 min    Order initials MICHELE     Administered initials MICHELE     GLSCOM Comments     BLOOD GAS & LYTES, ARTERIAL    Collection Time: 01/14/22  4:44 PM   Result Value Ref Range    pH (POC) 7.46 (H) 7.35 - 7.45      pCO2 (POC) 21.6 (L) 35 - 45 MMHG    pO2 (POC) 81 75 - 100 MMHG    Sodium,  (H) 136 - 145 MMOL/L    Potassium, POC 3.2 (L) 3.5 - 5.1 MMOL/L    Calcium, ionized (POC) 1.21 1.12 - 1.32 mmol/L    Glucose,  (H) 65 - 100 MG/DL    Base deficit (POC) 6.5 mmol/L    HCO3 (POC) 15.3 (L) 22 - 26 MMOL/L    CO2, POC 16 13 - 23 MMOL/L    O2 SAT 97 %    Sample source ARTERIAL      SITE RIGHT RADIAL      MISTI'S TEST Positive      Device: NASAL CANNULA      FIO2, L/min 4      Performed by Neftaly)Sangeeta     GFRAA, POC Cannot be calculated >60 ml/min/1.73m2    GFRNA, POC Cannot be calculated >60 ml/min/1.73m2   GLUCOSE, POC    Collection Time: 01/14/22  5:52 PM   Result Value Ref Range    Glucose (POC) 177 (H) 65 - 100 mg/dL    Performed by Kaylen Cheng    Collection Time: 01/14/22  5:53 PM   Result Value Ref Range    Glucose 177 mg/dL    Insulin order 2.3 units/hour    Insulin adminstered 2.3 units/hour    Multiplier 0.020     Low target 150 mg/dL    High target 250 mg/dL    D50 order 0.0 ml    D50 administered 0.00 ml    Minutes until next BG 60 min    Order initials TK     Administered initials TK     GLSCOM Comments     METABOLIC PANEL, BASIC    Collection Time: 01/14/22  6:18 PM   Result Value Ref Range    Sodium 151 (H) 136 - 145 mmol/L    Potassium 3.3 (L) 3.5 - 5.1 mmol/L    Chloride 119 (H) 98 - 107 mmol/L    CO2 18 (L) 21 - 32 mmol/L    Anion gap 14 7 - 16 mmol/L    Glucose 223 (H) 65 - 100 mg/dL    BUN 17 8 - 23 MG/DL    Creatinine 0.82 0.6 - 1.0 MG/DL    GFR est AA >60 >60 ml/min/1.73m2    GFR est non-AA >60 >60 ml/min/1.73m2    Calcium 8.9 8.3 - 10.4 MG/DL   MAGNESIUM    Collection Time: 01/14/22  6:18 PM   Result Value Ref Range    Magnesium 1.7 (L) 1.8 - 2.4 mg/dL   HGB & HCT    Collection Time: 01/14/22  6:18 PM   Result Value Ref Range    HGB 12.8 11.7 - 15.4 g/dL    HCT 39.2 35.8 - 46.3 %   TYPE & SCREEN    Collection Time: 01/14/22  6:33 PM   Result Value Ref Range    Crossmatch Expiration 01/17/2022,2359     ABO/Rh(D) A POSITIVE     Antibody screen NEG    GLUCOSE, POC    Collection Time: 01/14/22  6:56 PM   Result Value Ref Range    Glucose (POC) 208 (H) 65 - 100 mg/dL    Performed by Jasmin Lunsford    Collection Time: 01/14/22  6:56 PM   Result Value Ref Range    Glucose 208 mg/dL    Insulin order 3.0 units/hour    Insulin adminstered 3.0 units/hour    Multiplier 0.020     Low target 150 mg/dL    High target 250 mg/dL    D50 order 0.0 ml    D50 administered 0.00 ml    Minutes until next BG 60 min    Order initials MICHELE     Administered initials Jesus Noé Comments     GLUCOSE, POC    Collection Time: 01/14/22  8:11 PM   Result Value Ref Range    Glucose (POC) 194 (H) 65 - 100 mg/dL    Performed by Leena Fu    Collection Time: 01/14/22  8:14 PM   Result Value Ref Range    Glucose 194 mg/dL    Insulin order 2.7 units/hour    Insulin adminstered 2.7 units/hour    Multiplier 0.020     Low target 150 mg/dL    High target 250 mg/dL    D50 order 0.0 ml    D50 administered 0.00 ml    Minutes until next BG 60 min    Order initials MR     Administered initials MR     GLSCOM Comments     GLUCOSE, POC    Collection Time: 01/14/22  9:07 PM   Result Value Ref Range    Glucose (POC) 188 (H) 65 - 100 mg/dL    Performed by Renan Schulz    Collection Time: 01/14/22  9:08 PM   Result Value Ref Range    Glucose 188 mg/dL    Insulin order 2.6 units/hour    Insulin adminstered 2.6 units/hour    Multiplier 0.020     Low target 150 mg/dL    High target 250 mg/dL    D50 order 0.0 ml    D50 administered 0.00 ml    Minutes until next BG 60 min    Order initials MR     Administered initials MR     GLSCOM Comments     GLUCOSE, POC    Collection Time: 01/14/22 10:02 PM   Result Value Ref Range    Glucose (POC) 199 (H) 65 - 100 mg/dL    Performed by Rayrayevelyn Klein    Collection Time: 01/14/22 10:04 PM   Result Value Ref Range    Glucose 199 mg/dL    Insulin order 2.8 units/hour    Insulin adminstered 2.8 units/hour    Multiplier 0.020     Low target 150 mg/dL    High target 250 mg/dL    D50 order 0.0 ml    D50 administered 0.00 ml    Minutes until next BG 60 min    Order initials MR     Administered initials MR     GLSCOM Comments     GLUCOSE, POC    Collection Time: 01/14/22 11:05 PM   Result Value Ref Range    Glucose (POC) 178 (H) 65 - 100 mg/dL    Performed by Rayray Klein    Collection Time: 01/14/22 11:06 PM   Result Value Ref Range    Glucose 178 mg/dL    Insulin order 2.4 units/hour    Insulin adminstered 2.4 units/hour    Multiplier 0.020     Low target 150 mg/dL    High target 250 mg/dL    D50 order 0.0 ml    D50 administered 0.00 ml    Minutes until next BG 60 min    Order initials MR     Administered initials      GLSCOM Comments     GLUCOSE, POC    Collection Time: 01/15/22 12:12 AM   Result Value Ref Range    Glucose (POC) 185 (H) 65 - 100 mg/dL    Performed by Rayray Klein    Collection Time: 01/15/22 12:13 AM   Result Value Ref Range    Glucose 185 mg/dL    Insulin order 2.5 units/hour    Insulin adminstered 2.5 units/hour    Multiplier 0.020     Low target 150 mg/dL    High target 250 mg/dL    D50 order 0.0 ml    D50 administered 0.00 ml    Minutes until next BG 60 min    Order initials MR     Administered initials MR     GLSCOM Comments     METABOLIC PANEL, BASIC    Collection Time: 01/15/22 12:53 AM   Result Value Ref Range    Sodium 153 (H) 136 - 145 mmol/L    Potassium 2.8 (L) 3.5 - 5.1 mmol/L    Chloride 119 (H) 98 - 107 mmol/L    CO2 26 21 - 32 mmol/L    Anion gap 8 7 - 16 mmol/L    Glucose 193 (H) 65 - 100 mg/dL    BUN 14 8 - 23 MG/DL    Creatinine 0.79 0.6 - 1.0 MG/DL    GFR est AA >60 >60 ml/min/1.73m2    GFR est non-AA >60 >60 ml/min/1.73m2    Calcium 8.7 8.3 - 10.4 MG/DL   MAGNESIUM    Collection Time: 01/15/22 12:53 AM   Result Value Ref Range    Magnesium 1.7 (L) 1.8 - 2.4 mg/dL   GLUCOSE, POC    Collection Time: 01/15/22 12:58 AM   Result Value Ref Range    Glucose (POC) 182 (H) 65 - 100 mg/dL    Performed by Georgia Lacy    Collection Time: 01/15/22 12:58 AM   Result Value Ref Range    Glucose 182 mg/dL    Insulin order 2.4 units/hour    Insulin adminstered 2.4 units/hour    Multiplier 0.020     Low target 150 mg/dL    High target 250 mg/dL    D50 order 0.0 ml    D50 administered 0.00 ml    Minutes until next BG 60 min    Order initials MR     Administered initials MR     GLSCOM Comments     GLUCOSE, POC    Collection Time: 01/15/22  2:03 AM   Result Value Ref Range    Glucose (POC) 174 (H) 65 - 100 mg/dL    Performed by Georgia Lacy    Collection Time: 01/15/22  2:03 AM   Result Value Ref Range    Glucose 174 mg/dL    Insulin order 2.3 units/hour    Insulin adminstered 2.3 units/hour    Multiplier 0.020     Low target 150 mg/dL    High target 250 mg/dL    D50 order 0.0 ml    D50 administered 0.00 ml    Minutes until next BG 60 min    Order initials MR     Administered initials MR     GLSCOM Comments     GLUCOSE, POC    Collection Time: 01/15/22  3:07 AM   Result Value Ref Range    Glucose (POC) 155 (H) 65 - 100 mg/dL    Performed by Junior Azevedo    Collection Time: 01/15/22  3:08 AM   Result Value Ref Range    Glucose 155 mg/dL    Insulin order 1.9 units/hour    Insulin adminstered 1.9 units/hour    Multiplier 0.020     Low target 150 mg/dL    High target 250 mg/dL    D50 order 0.0 ml    D50 administered 0.00 ml    Minutes until next BG 60 min    Order initials MR     Administered initials MR     GLSCOM Comments     CBC W/O DIFF    Collection Time: 01/15/22  3:31 AM   Result Value Ref Range    WBC 17.0 (H) 4.3 - 11.1 K/uL RBC 4.58 4.05 - 5.2 M/uL    HGB 12.4 11.7 - 15.4 g/dL    HCT 38.4 35.8 - 46.3 %    MCV 83.8 79.6 - 97.8 FL    MCH 27.1 26.1 - 32.9 PG    MCHC 32.3 31.4 - 35.0 g/dL    RDW 15.0 (H) 11.9 - 14.6 %    PLATELET 272 (H) 769 - 450 K/uL    MPV 8.3 (L) 9.4 - 12.3 FL    ABSOLUTE NRBC 0.00 0.0 - 0.2 K/uL   METABOLIC PANEL, BASIC    Collection Time: 01/15/22  3:31 AM   Result Value Ref Range    Sodium 152 (H) 136 - 145 mmol/L    Potassium 2.7 (L) 3.5 - 5.1 mmol/L    Chloride 117 (H) 98 - 107 mmol/L    CO2 27 21 - 32 mmol/L    Anion gap 8 7 - 16 mmol/L    Glucose 164 (H) 65 - 100 mg/dL    BUN 13 8 - 23 MG/DL    Creatinine 0.70 0.6 - 1.0 MG/DL    GFR est AA >60 >60 ml/min/1.73m2    GFR est non-AA >60 >60 ml/min/1.73m2    Calcium 9.0 8.3 - 10.4 MG/DL   MAGNESIUM    Collection Time: 01/15/22  3:31 AM   Result Value Ref Range    Magnesium 2.5 (H) 1.8 - 2.4 mg/dL   BLOOD GAS, ARTERIAL POC    Collection Time: 01/15/22  3:53 AM   Result Value Ref Range    Device: NASAL CANNULA      pH (POC) 7.50 (H) 7.35 - 7.45      pCO2 (POC) 32.2 (L) 35 - 45 MMHG    pO2 (POC) 96 75 - 100 MMHG    HCO3 (POC) 25.2 22 - 26 MMOL/L    sO2 (POC) 98.1 (H) 95 - 98 %    Base excess (POC) 2.5 mmol/L    Allens test (POC) Positive      Site RIGHT BRACHIAL      Specimen type (POC) ARTERIAL      Performed by Inez     FIO2, L/min 3     GLUCOSE, POC    Collection Time: 01/15/22  4:10 AM   Result Value Ref Range    Glucose (POC) 180 (H) 65 - 100 mg/dL    Performed by Jael Melena    Collection Time: 01/15/22  4:10 AM   Result Value Ref Range    Glucose 180 mg/dL    Insulin order 2.4 units/hour    Insulin adminstered 2.4 units/hour    Multiplier 0.020     Low target 150 mg/dL    High target 250 mg/dL    D50 order 0.0 ml    D50 administered 0.00 ml    Minutes until next BG 60 min    Order initials MR     Administered initials MR     GLSCOM Comments     GLUCOSE, POC    Collection Time: 01/15/22  5:14 AM   Result Value Ref Range Glucose (POC) 147 (H) 65 - 100 mg/dL    Performed by Enriqueta Pitts    Collection Time: 01/15/22  5:15 AM   Result Value Ref Range    Glucose 147 mg/dL    Insulin order 0.9 units/hour    Insulin adminstered 0.9 units/hour    Multiplier 0.010     Low target 150 mg/dL    High target 250 mg/dL    D50 order 0.0 ml    D50 administered 0.00 ml    Minutes until next BG 60 min    Order initials MR     Administered initials MR ROSE Comments     GLUCOSE, POC    Collection Time: 01/15/22  6:11 AM   Result Value Ref Range    Glucose (POC) 157 (H) 65 - 100 mg/dL    Performed by Enriqueta Pitts    Collection Time: 01/15/22  6:12 AM   Result Value Ref Range    Glucose 157 mg/dL    Insulin order 1.0 units/hour    Insulin adminstered 1.0 units/hour    Multiplier 0.010     Low target 150 mg/dL    High target 250 mg/dL    D50 order 0.0 ml    D50 administered 0.00 ml    Minutes until next BG 60 min    Order initials MR     Administered initials MR ROSE Comments     GLUCOSE, POC    Collection Time: 01/15/22  7:18 AM   Result Value Ref Range    Glucose (POC) 165 (H) 65 - 100 mg/dL    Performed by Nicho    GLUCOSTABILIZER    Collection Time: 01/15/22  7:20 AM   Result Value Ref Range    Glucose 165 mg/dL    Insulin order 1.1 units/hour    Insulin adminstered 1.1 units/hour    Multiplier 0.010     Low target 150 mg/dL    High target 250 mg/dL    D50 order 0.0 ml    D50 administered 0.00 ml    Minutes until next BG 60 min    Order initials MR     Administered initials MR ROSE Comments         All Micro Results     Procedure Component Value Units Date/Time    CULTURE, URINE [[id number]]  (Abnormal)  (Susceptibility) Collected: 01/12/22 0227    Order Status: Completed Specimen: Urine from Clean catch Updated: 01/15/22 0733     Special Requests: NO SPECIAL REQUESTS        Culture result:       >100,000 COLONIES/mL ESCHERICHIA COLI                  50,000-100,000 COLONIES/mL NORMAL SKIN SNOW ISOLATED          CULTURE, BLOOD [[id number]] Collected: 01/12/22 0229    Order Status: Completed Specimen: Blood Updated: 01/15/22 0713     Special Requests: --        RIGHT  Antecubital       Culture result: NO GROWTH 3 DAYS       CULTURE, BLOOD [[id number]] Collected: 01/12/22 1249    Order Status: Completed Specimen: Blood Updated: 01/15/22 0713     Special Requests: --        RIGHT  FOREARM       Culture result: NO GROWTH 3 DAYS       COVID-19 RAPID TEST [[id number]] Collected: 01/12/22 0227    Order Status: Completed Specimen: Nasopharyngeal Updated: 01/12/22 0320     Specimen source NASAL        COVID-19 rapid test Not detected        Comment:      The specimen is NEGATIVE for SARS-CoV-2, the novel coronavirus associated with COVID-19. A negative result does not rule out COVID-19. This test has been authorized by the FDA under an Emergency Use Authorization (EUA) for use by authorized laboratories. Fact sheet for Healthcare Providers: [employer]  Fact sheet for Patients: [employer]       Methodology: Isothermal Nucleic Acid Amplification               Other Studies:  CT HEAD WO CONT    Result Date: 1/14/2022  NONCONTRAST HEAD CT CLINICAL HISTORY:  Worsening lethargy. TECHNIQUE:  Axial images were obtained with spiral technique. Radiation dose reduction was achieved using one or all of the following techniques: automated exposure control, weight-based dosing, iterative reconstruction. COMPARISON:  MRI of March 23, 2020. REPORT:   Standard noncontrast head CT demonstrates no definite intracranial mass effect, hemorrhage, or evidence of acute geographic infarction. White matter hypodensities are most consistent with small vessel ischemic disease. The ventricles are normal in size and configuration, accounting for the patient's age.   There is subtotal soft tissue opacification of the right maxillary sinus with some high-density component suggesting the possibility of fungal sinusitis. There is also opacification of the left sphenoid sinus and much of the right frontal sinus. Orbits  and other paranasal sinuses are clear where imaged. Bone windows demonstrate no definite fracture or destruction. 1. SMALL VESSEL ISCHEMIC DISEASE WITH NO ACUTE INTRACRANIAL ABNORMALITY IDENTIFIED AT NONCONTRAST CT. 2.  RIGHT MAXILLARY AND FRONTAL AND LEFT SPHENOID SINUSITIS WITH HIGH-ATTENUATION COMPONENTS IN THE MAXILLARY SINUS SUGGESTING THE POSSIBILITY OF A FUNGAL ETIOLOGY. .       Current Meds:  Current Facility-Administered Medications   Medication Dose Route Frequency    [Held by provider] sodium bicarbonate (8.4%) 150 mEq in dextrose 5% 1,000 mL infusion   IntraVENous CONTINUOUS    potassium chloride 20 mEq in 100 ml IVPB  20 mEq IntraVENous Q2H    dextrose 5% infusion  151 mL/hr IntraVENous CONTINUOUS    NUTRITIONAL SUPPORT ELECTROLYTE PRN ORDERS   Does Not Apply PRN    pantoprazole (PROTONIX) 40 mg in 0.9% sodium chloride 10 mL injection  40 mg IntraVENous BID    piperacillin-tazobactam (ZOSYN) 3.375 g in 0.9% sodium chloride (MBP/ADV) 100 mL MBP  3.375 g IntraVENous Q8H    albuterol (PROVENTIL VENTOLIN) nebulizer solution 2.5 mg  2.5 mg Nebulization Q4H PRN    nystatin (MYCOSTATIN) 100,000 unit/gram powder   Topical BID    morphine injection 2 mg  2 mg IntraVENous Q4H PRN    sodium chloride (NS) flush 5-40 mL  5-40 mL IntraVENous Q8H    sodium chloride (NS) flush 5-40 mL  5-40 mL IntraVENous PRN    acetaminophen (TYLENOL) tablet 650 mg  650 mg Oral Q6H PRN    Or    acetaminophen (TYLENOL) suppository 650 mg  650 mg Rectal Q6H PRN    polyethylene glycol (MIRALAX) packet 17 g  17 g Oral DAILY PRN    ondansetron (ZOFRAN ODT) tablet 4 mg  4 mg Oral Q8H PRN    Or    ondansetron (ZOFRAN) injection 4 mg  4 mg IntraVENous Q6H PRN    enoxaparin (LOVENOX) injection 40 mg  40 mg SubCUTAneous DAILY    magnesium hydroxide (MILK OF MAGNESIA) 400 mg/5 mL oral suspension 30 mL  30 mL Oral DAILY PRN    alum-mag hydroxide-simeth (MYLANTA) oral suspension 15 mL  15 mL Oral Q6H PRN    azithromycin (ZITHROMAX) 500 mg in 0.9% sodium chloride 250 mL (VIAL-MATE)  500 mg IntraVENous Q24H    amitriptyline (ELAVIL) tablet 25 mg  25 mg Oral QHS PRN    atorvastatin (LIPITOR) tablet 20 mg  20 mg Oral DAILY    aspirin delayed-release tablet 81 mg  81 mg Oral DAILY    budesonide-formoterol (SYMBICORT) 80-4.5 mcg inhaler  2 Puff Inhalation BID RT    sertraline (ZOLOFT) tablet 100 mg  100 mg Oral DAILY    HYDROcodone-acetaminophen (NORCO) 5-325 mg per tablet 1 Tablet  1 Tablet Oral Q8H PRN    docusate sodium (COLACE) capsule 100 mg  100 mg Oral BID       Signed:  Leeann Harman MD    Part of this note may have been written by using a voice dictation software. The note has been proof read but may still contain some grammatical/other typographical errors.

## 2022-01-15 NOTE — PROGRESS NOTES
Bedside shift change report given to Marine Gaines RN (oncoming nurse) by Fercho Berger RN (offgoing nurse). Report included the following information SBAR, Kardex, ED Summary, Procedure Summary, Intake/Output, MAR, Recent Results and Cardiac Rhythm ST.     Insulin gtt dual verified

## 2022-01-15 NOTE — CONSULTS
Subjective:      Referring Physician : Bhanu Talbert  Primary GI :Arville Bile    Chief Complaint : coffee ground emesis  History of Present Illness :Patient is a 57 y.o. female presented after a falling right hip. She fell over a week ago and injuring her shoulder. She has been on norco. XR hip without acute process. She has a left humeral fracture that occurred while on [employer]. Not seen by ortho to date until here. Records were evaluated per ortho this admit and recommends CT left shoulder and outpatient followup with Dr. Brian Vallecillo. She has underlying asthma, DM2, CVA, obesity, neuropathy, and  JESSICA on CPAP.      She was hypoxic, place don 2 lpm. CXR with atelectasis/infiltrates. + UTI and abx initiated. Noted to have a small amt of coffee ground emesis. Pt lethargic. Hx from . egd and colo when she turned 60.  Probably by [place]   Pt noted to have small amt of coffee ground emesis    Pain- no  Bleeding- as above  Bowel movements- no  Nausea- gastroparesis  Vomiting- no  Dysphagia- hx of dilation  ASA/ NSAIDS- daily asa but on prilosec also  Anticoagulants- no  Wt loss- no  Past Medical History:   Diagnosis Date    Asthma     Diabetes mellitus ([facility] [address]     Gastroparesis 2021    [facility] 64 History of CVA (cerebrovascular accident)     Neuropathy     Obesity     Sleep apnea      Past Surgical History:   Procedure Laterality Date    HX BREAST BIOPSY      HX  SECTION      HX COLONOSCOPY  2018    HX REFRACTIVE SURGERY      RI EGD DELIVER THERMAL ENERGY SPHNCTR/CARDIA GERD  2021     and sood dilation    RI EGD DELIVER THERMAL ENERGY SPHNCTR/CARDIA GERD  2021     Family History   Problem Relation Age of Onset    Cancer Mother     COPD Father      Current Facility-Administered Medications   Medication Dose Route Frequency    [Held by provider] sodium bicarbonate (8.4%) 150 mEq in dextrose 5% 1,000 mL infusion   IntraVENous CONTINUOUS    potassium chloride 20 mEq in 100 ml IVPB  20 mEq IntraVENous Q2H    dextrose 5% infusion  151 mL/hr IntraVENous CONTINUOUS    NUTRITIONAL SUPPORT ELECTROLYTE PRN ORDERS   Does Not Apply PRN    pantoprazole (PROTONIX) 40 mg in 0.9% sodium chloride 10 mL injection  40 mg IntraVENous BID    piperacillin-tazobactam (ZOSYN) 3.375 g in 0.9% sodium chloride (MBP/ADV) 100 mL MBP  3.375 g IntraVENous Q8H    albuterol (PROVENTIL VENTOLIN) nebulizer solution 2.5 mg  2.5 mg Nebulization Q4H PRN    nystatin (MYCOSTATIN) 100,000 unit/gram powder   Topical BID    morphine injection 2 mg  2 mg IntraVENous Q4H PRN    sodium chloride (NS) flush 5-40 mL  5-40 mL IntraVENous Q8H    sodium chloride (NS) flush 5-40 mL  5-40 mL IntraVENous PRN    acetaminophen (TYLENOL) tablet 650 mg  650 mg Oral Q6H PRN    Or    acetaminophen (TYLENOL) suppository 650 mg  650 mg Rectal Q6H PRN    polyethylene glycol (MIRALAX) packet 17 g  17 g Oral DAILY PRN    ondansetron (ZOFRAN ODT) tablet 4 mg  4 mg Oral Q8H PRN    Or    ondansetron (ZOFRAN) injection 4 mg  4 mg IntraVENous Q6H PRN    enoxaparin (LOVENOX) injection 40 mg  40 mg SubCUTAneous DAILY    magnesium hydroxide (MILK OF MAGNESIA) 400 mg/5 mL oral suspension 30 mL  30 mL Oral DAILY PRN    alum-mag hydroxide-simeth (MYLANTA) oral suspension 15 mL  15 mL Oral Q6H PRN    azithromycin (ZITHROMAX) 500 mg in 0.9% sodium chloride 250 mL (VIAL-MATE)  500 mg IntraVENous Q24H    amitriptyline (ELAVIL) tablet 25 mg  25 mg Oral QHS PRN    atorvastatin (LIPITOR) tablet 20 mg  20 mg Oral DAILY    aspirin delayed-release tablet 81 mg  81 mg Oral DAILY    budesonide-formoterol (SYMBICORT) 80-4.5 mcg inhaler  2 Puff Inhalation BID RT    sertraline (ZOLOFT) tablet 100 mg  100 mg Oral DAILY    HYDROcodone-acetaminophen (NORCO) 5-325 mg per tablet 1 Tablet  1 Tablet Oral Q8H PRN    docusate sodium (COLACE) capsule 100 mg  100 mg Oral BID     Prior to Admission medications    Medication Sig Start Date End Date Taking? Authorizing Provider   naloxone (Narcan) 4 mg/actuation nasal spray Use 1 spray intranasally, then discard. Repeat with new spray every 2 min as needed for opioid overdose symptoms, alternating nostrils. 1/12/22  Yes Debby Barron MD   lisinopriL (PRINIVIL, ZESTRIL) 20 mg tablet Take 20 mg by mouth daily. Yes Provider, Historical   montelukast (Singulair) 10 mg tablet Take 10 mg by mouth daily. Yes Provider, Historical   aspirin delayed-release 81 mg tablet Take 81 mg by mouth daily. Yes Provider, Historical   amitriptyline (ELAVIL) 75 mg tablet Take 25 mg by mouth nightly as needed for Sleep. Yes Provider, Historical   sertraline (Zoloft) 100 mg tablet Take 100 mg by mouth daily. Yes Provider, Historical   pregabalin (Lyrica) 300 mg capsule Take 300 mg by mouth two (2) times a day. Yes Provider, Historical   empagliflozin (JARDIANCE) 10 mg tablet Take 10 mg by mouth daily. Yes Provider, Historical   fluticasone propion-salmeteroL (Advair HFA) 115-21 mcg/actuation inhaler Take 2 Puffs by inhalation two (2) times a day. Yes Provider, Historical   fluticasone propionate (Flonase Allergy Relief) 50 mcg/actuation nasal spray 2 Sprays by Both Nostrils route daily. Yes Provider, Historical   omeprazole (PRILOSEC) 20 mg capsule Take 20 mg by mouth daily. Yes Provider, Historical   amLODIPine (Norvasc) 5 mg tablet Take 5 mg by mouth daily. Yes Provider, Historical   atorvastatin (Lipitor) 20 mg tablet Take 20 mg by mouth daily. Yes Provider, Historical   meclizine (ANTIVERT) 25 mg tablet Take 1 Tab by mouth three (3) times daily as needed for Dizziness or Nausea for up to 20 doses. 3/23/20   Gerald Ramirez, DO   ondansetron (ZOFRAN ODT) 4 mg disintegrating tablet Take 1 Tab by mouth every eight (8) hours as needed for Nausea.  3/23/20   Gerald Ramirez, DO     Allergies   Allergen Reactions    Other Food Swelling     Shell fish    Contrast Agent [Iodine] Anaphylaxis    Banana Itching and Swelling     Mouth itches and swells     Social History     Occupational History    Not on file   Tobacco Use    Smoking status: Passive Smoke Exposure - Never Smoker    Smokeless tobacco: Never Used   Substance and Sexual Activity    Alcohol use: Never    Drug use: Never    Sexual activity: Not on file       Review of Systems: The rest of 10 organ review of systems is negative or as per HPI and PMH. Objective:     Physical Exam:     Patient Vitals for the past 8 hrs:   BP Temp Pulse Resp SpO2   01/15/22 0833     98 %   01/15/22 0813 (!) 155/73  (!) 104 20 98 %   01/15/22 0740 (!) 127/59 99 °F (37.2 °C) (!) 104 26 97 %   01/15/22 0720 (!) 126/58  (!) 101  98 %   01/15/22 0700 139/64  (!) 104  99 %   01/15/22 0645 117/75  100  97 %   01/15/22 0630 139/64  (!) 104  99 %   01/15/22 0615   (!) 101  98 %   01/15/22 0600 (!) 148/80  100  97 %   01/15/22 0545 (!) 152/67  (!) 102  99 %   01/15/22 0530   (!) 104  99 %   01/15/22 0515 (!) 154/68  (!) 101  98 %   01/15/22 0500 (!) 146/65  (!) 101  97 %   01/15/22 0445 (!) 142/65  (!) 104  96 %   01/15/22 0430   (!) 104  99 %   01/15/22 0415 138/64  (!) 104  98 %   01/15/22 0400 (!) 154/67  (!) 104  98 %   01/15/22 0345 (!) 149/72  (!) 104  98 %   01/15/22 0330 (!) 151/73  (!) 101  96 %   01/15/22 0315   (!) 101  96 %   01/15/22 0300 138/65 98.7 °F (37.1 °C) 100 26 95 %   01/15/22 0245 (!) 154/65  (!) 102  96 %   01/15/22 0230   (!) 103  96 %   01/15/22 0215 (!) 149/65  (!) 107  98 %   01/15/22 0200 (!) 151/67  (!) 108  98 %     General:  Skin:  Extremities and face reveal no rashes. No ulrich erythema. No telangiectasias on the chest wall. HEENT: Sclerae anicteric. No oral ulcers. No abnormal pigmentation of the lips. Cardiovascular: Regular rate and rhythm. No murmurs, gallops, or rubs. PMI nondisplaced. Carotids without bruits.   Respiratory:  Comfortable breathing  With no accessory muscle use. Clear breath sounds with no rales bruits. GI:  Abdomen nondistended, soft, and nontender. Normal active bowel sounds. No enlargement of the liver or spleen. No masses palpable. Rectal:  Deferred  Musculoskeletal:  Boots on. The neck is supple and extremities have good range of motion. No costovertebral tenderness. Neurological:  Somnolent-  gives hx  Psychiatric:  na  Lymphatic:  No cervical or supraclavicular adenopathy.     Laboratory:    Lab Results   Component Value Date/Time    WBC 17.0 (H) 01/15/2022 03:31 AM    RBC 4.58 01/15/2022 03:31 AM    HGB 12.4 01/15/2022 03:31 AM    HCT 38.4 01/15/2022 03:31 AM    PLATELET 785 (H) 40/99/0188 03:31 AM     Lab Results   Component Value Date/Time    Sodium 152 (H) 01/15/2022 03:31 AM    Potassium 2.7 (L) 01/15/2022 03:31 AM    Chloride 117 (H) 01/15/2022 03:31 AM    CO2 27 01/15/2022 03:31 AM    Anion gap 8 01/15/2022 03:31 AM    Glucose 164 (H) 01/15/2022 03:31 AM    BUN 13 01/15/2022 03:31 AM    Creatinine 0.70 01/15/2022 03:31 AM    GFR est AA >60 01/15/2022 03:31 AM    GFR est non-AA >60 01/15/2022 03:31 AM    Calcium 9.0 01/15/2022 03:31 AM    Magnesium 2.5 (H) 01/15/2022 03:31 AM    Phosphorus 2.2 (L) 01/14/2022 08:38 AM    Albumin 2.9 (L) 01/12/2022 02:29 AM    Bilirubin, total 0.8 01/12/2022 02:29 AM    Protein, total 6.9 01/12/2022 02:29 AM    Globulin 4.0 (H) 01/12/2022 02:29 AM    A-G Ratio 0.7 (L) 01/12/2022 02:29 AM    ALT (SGPT) 12 01/12/2022 02:29 AM          Assessment/Plan:       Hospital Problems  Never Reviewed          Codes Class Noted POA    Hematemesis with nausea ICD-10-CM: K92.0  ICD-9-CM: 578.0, 787.02  1/15/2022 No        Metabolic acidosis WRL-61-KT: E87.2  ICD-9-CM: 276.2  1/13/2022 No        Pneumonia ICD-10-CM: J18.9  ICD-9-CM: 145  1/12/2022 Yes        UTI (urinary tract infection) ICD-10-CM: N39.0  ICD-9-CM: 599.0  1/12/2022 Yes        Hypoxia ICD-10-CM: R09.02  ICD-9-CM: 799.02  1/12/2022 Yes        Obesity (Chronic) ICD-10-CM: E66.9  ICD-9-CM: 278.00  1/12/2022 Yes        Closed fracture of surgical neck of humerus ICD-10-CM: S42.213A  ICD-9-CM: 812.01  1/12/2022 Yes        Acute respiratory failure with hypoxia [facility]) ICD-10-CM: J96.01  ICD-9-CM: 518.81  1/12/2022 Yes        * (Principal) Sepsis ([facility] [address] ICD-10-CM: A41.9  ICD-9-CM: 038.9, 995.91  1/12/2022 Yes          pt has stable hgb  Hx of  on PPI  Likely stress gastritis  [facility] records reviewed      Plan- monitor for active bleed; ppi; add carafate when taking po

## 2022-01-16 NOTE — PROGRESS NOTES
Bedside and verbal shift change report received from 1200 \[facility]\"" (offgoing nurse). Report included the following information SBAR, Kardex, ED Summary, OR Summary, Procedure Summary, Intake/Output, MAR, Recent Results, Med Rec Status, Cardiac Rhythm ST, Alarm Parameters , Pre Procedure Checklist, Procedure Verification and Quality Measures. Dual skin assessment completed at bedside: Abrasion to bilateral knees;  Excoriation / Rash to abdominal folds and bilateral groin; no pressure injury (list pertinent skin assessment findings)    Dual verification of gtts completed (name of gtts verified): NA

## 2022-01-16 NOTE — PROGRESS NOTES
Formerly [facility] Critical Care Note[de-identified] 1/16/2022  Amanda Coleman  Admission Date: 1/11/2022     Length of Stay: 4 days    Background: 57 y. o. female presented after a falling right hip. She fell over a week ago and injuring her shoulder. She has been on norco. XR hip without acute process. She has a left humeral fracture that occurred while on [employer]. Not seen by ortho to date until here. Records were evaluated per ortho this admit and recommends CT left shoulder and outpatient followup with Dr. Kellen Wilkes. She is wearing a sling.  She has underlying asthma, DM2, CVA, obesity, neuropathy, and  JESSICA on CPAP.      She has been hypoxic and CXR shows some atelectasis/infiltrates. + E coli UTI and she is on zosyn. Her BS was > 1000 - suspected DKA. Insulin drip initiated with hydration with improvement. Bicarb used with correction of acidosis. Notable PMH:  has a past medical history of Asthma, Diabetes mellitus ([facility] [address] Gastroparesis (06/28/2021), History of CVA (cerebrovascular accident), Neuropathy, Obesity, and Sleep apnea. 24 Hour events: has become more acidotic again since insulin drip and hco3 drip was stopped. Very tachypneic    ROS: unable to obtain/negative except as listed elsewhere. Lines: (insertion date)   ETT: (NA)  Nair: (1/13)  OGT: (NA)  Central line: (1/13)  Arterial Line (NA)    Drips: current dose (range)        Pertinent Exam:         Blood pressure (!) 162/77, pulse (!) 125, temperature 100.2 °F (37.9 °C), resp. rate (!) 47, height 5' 2\" (1.575 m), weight 220 lb (99.8 kg), SpO2 99 %. Intake/Output Summary (Last 24 hours) at 1/16/2022 1100  Last data filed at 1/16/2022 0715  Gross per 24 hour   Intake 2353.94 ml   Output 3200 ml   Net -846.06 ml     Constitutional:  in resp.  distress  EENMT:  Sclera clear, pupils equal, oral mucosa moist  Respiratory:  rhonchi  Cardiovascular:  RRR  Gastrointestinal:  soft with no tenderness; positive bowel sounds present  Musculoskeletal: warm with no cyanosis, no lower extremity edema  Skin:  no jaundice or ecchymosis  Neurologic:awake but does not follow commands, non verbal  Psychiatric: sedated and paralyzed    CXR: none yet    Recent Labs     01/16/22  0334 01/15/22  0331 01/14/22  1818 01/14/22  0243 01/14/22  0243 01/13/22  1442   WBC 14.4* 17.0*  --   --  17.7*  --    HGB 13.3 12.4 12.8   < > 12.6  --    HCT 42.2 38.4 39.2   < > 39.7  --     469*  --   --  497*  --    LAC  --   --   --   --  0.9 1.8    < > = values in this interval not displayed. Recent Labs     01/16/22  0334 01/15/22  2210 01/15/22  1616 01/15/22  0850 01/15/22  0850 01/15/22  0331 01/15/22  0331 01/15/22  0053 01/15/22  0053 01/14/22 1818 01/14/22  1818 01/14/22  1227 01/14/22  0838   *  148* 148* 148*   < > 152*   < > 152*   < > 153*   < > 151*   < > 145   K 3.5  --  3.1*  --  3.2*   < > 2.7*   < > 2.8*   < > 3.3*   < > 3.5   *  --   --   --   --   --  117*  --  119*   < > 119*   < > 116*   CO2 19*  --   --   --   --   --  27  --  26   < > 18*   < > 11*   *  --   --   --   --   --  164*  --  193*   < > 223*   < > 268*   BUN 14  --   --   --   --   --  13  --  14   < > 17   < > 20   CREA 0.57*  --   --   --   --   --  0.70  --  0.79   < > 0.82   < > 0.70   MG  --   --   --   --   --   --  2.5*  --  1.7*  --  1.7*   < > 2.8*   CA 9.2  --   --   --   --   --  9.0  --  8.7   < > 8.9   < > 9.0   PHOS  --   --   --   --   --   --   --   --   --   --   --   --  2.2*    < > = values in this interval not displayed. Recent Labs     01/14/22  0243 01/13/22  1442   LAC 0.9 1.8     Recent Labs     01/16/22  0628 01/15/22  2314 01/15/22  1807   GLUCPOC 164* 130* 153*     ECHO: No results found for this or any previous visit.      Results     Procedure Component Value Units Date/Time    SARS-COV-2, PCR [[id number]] Collected: 01/16/22 0808    Order Status: Completed Updated: 01/16/22 0933    FUNGUS CULTURE AND SMEAR [[id number]] Collected: 01/15/22 1607 Order Status: Canceled Specimen: Other     FUNGUS CULTURE AND SMEAR [[id number]] Collected: 01/15/22 1517    Order Status: Canceled Specimen: Other     CULTURE, BLOOD [[id number]] Collected: 01/12/22 1249    Order Status: Completed Specimen: Blood Updated: 01/16/22 0632     Special Requests: --        RIGHT  FOREARM       Culture result: NO GROWTH 4 DAYS       CULTURE, BLOOD [[id number]] Collected: 01/12/22 0229    Order Status: Completed Specimen: Blood Updated: 01/16/22 0632     Special Requests: --        RIGHT  Antecubital       Culture result: NO GROWTH 4 DAYS       COVID-19 RAPID TEST [[id number]] Collected: 01/12/22 0227    Order Status: Completed Specimen: Nasopharyngeal Updated: 01/12/22 0320     Specimen source NASAL        COVID-19 rapid test Not detected        Comment:      The specimen is NEGATIVE for SARS-CoV-2, the novel coronavirus associated with COVID-19. A negative result does not rule out COVID-19. This test has been authorized by the FDA under an Emergency Use Authorization (EUA) for use by authorized laboratories.         Fact sheet for Healthcare Providers: ConventionUpdate.co.nz  Fact sheet for Patients: ConventionUpdate.co.nz       Methodology: Isothermal Nucleic Acid Amplification         CULTURE, URINE [[id number]]  (Abnormal)  (Susceptibility) Collected: 01/12/22 0227    Order Status: Completed Specimen: Urine from Clean catch Updated: 01/15/22 0720     Special Requests: NO SPECIAL REQUESTS        Culture result:       >100,000 COLONIES/mL ESCHERICHIA COLI                  50,000-100,000 COLONIES/mL NORMAL SKIN SNOW ISOLATED          Susceptibility      Escherichia coli     JAMIL     Ampicillin ($) Resistant     Ampicillin/sulbactam ($) Resistant     Aztreonam ($$$$) Susceptible     Cefazolin ($) Resistant     Cefepime ($$) Susceptible     Ceftriaxone ($) Susceptible     Gentamicin ($) Susceptible     Nitrofurantoin Susceptible Piperacillin/Tazobac ($) Susceptible     Tobramycin ($) Susceptible     Trimeth-Sulfamethoxa Susceptible                  Linear View                       Inpat Anti-Infectives (From admission, onward)     Start     Ordered Stop    01/15/22 1700  cefTRIAXone (ROCEPHIN) 1 g in 0.9% sodium chloride (MBP/ADV) 50 mL MBP  1 g,   IntraVENous,   EVERY 24 HOURS         01/15/22 1649 01/20/22 1659    01/13/22 1800  nystatin (MYCOSTATIN) 100,000 unit/gram powder  Topical,   2 TIMES DAILY         01/13/22 1203 --              Ventilator Settings:  Ideal body weight: 50.1 kg (110 lb 7.2 oz)   Mode FIO2 Rate Tidal Volume Pressure PEEP      25 %               Peak airway pressure:         Minute ventilation: 20.8 l/min  ABG:  Recent Labs     01/16/22  0834 01/15/22  0353 01/14/22  1644   PHI 7.45 7.50* 7.46*   PCO2I 17.4* 32.2* 21.6*   PO2I 83 96 81   HCO3I 12.2* 25.2 15.3*     Assessment and Plan:  (Medical Decision Making)       Impression: 57 y.o. female with recent fall and left humeral fx. Wearing sling and scheduled for outpatient follow up with Dr. Chava Adam. BS > 1000 so insulin gtt initiated in the ICU. Required transfer to the ICU for tx of DKA/acidosis. Required bipap short term due to mental status. Hospitalist managing DM. CT head on 1/14 negative. + e coli UTI - on Zosyn.      NEURO:  awake but non-verbal , not following commands  Sedation: NA  Analgesia: NA  Paralytics: NA  CV:   Volume Status: -6L since admit  Septic shock: NA  PULM:   Acute hypoxemic/hypercapneic respiratory failure:  resume bipap -pt in resp distress-critically ill  JESSICA: uses home CPAP. CO2 only 32. Using our bipap here but can have family bring her home CPAP and use this when available  RENAL:  PAVITHRA: NA  Lactic acidosis: NA  Electrolytes: Na 148- on D5W. K+ supplemented  GI:   Nutrition: none at present.  Will need to hold po diet until she is more alert  HEME:   Anemia:NA  Thrombocytopenia: NA  Anticoagulation: low dose lovenox  ID:   E coli UTI - on zosyn which organism is sensitive to. Also using Zithromax with infiltrates on CXR - ? PNA -cxr today  ENDO:   DM:  now. Almost off insulin gtt. hospiatlist managing. -discussed with Dr. Kay Gonzalez- need to start insulin drip back  Skin: no decub, turns, preventive care  Prophy: lovenox/protonix    Full Code    The patient is critically ill with respiratory failure, circulatory failure and requires high complexity decision making for assessment and support including frequent ventilator adjustment , frequent evaluation and titration of therapies , application of advanced monitoring technologies and extensive interpretation of multiple databases    Cumulative time devoted to patient care services by me for day of service is 37 mins.     Vanessa Hernandez MD

## 2022-01-16 NOTE — PROGRESS NOTES
Patient continues to have a fever of 102 axillary after receiving a Tylenol suppository, patient to be placed on cooling blanket. Patient's anion gap=18, CO2=17.4, HCO3=12.2. Notified Dr. Kathi Park and Dr. Carmela Cm, both MDs at the bedside. Orders received to restart bicarb gtt and place patient on BIPAP, further orders pending.

## 2022-01-16 NOTE — PROGRESS NOTES
Bedside shift change report given to JANE Duval (oncoming nurse) by Pacheco Walter RN (offgoing nurse).  Report included the following information SBAR, Kardex, ED Summary, Procedure Summary, Intake/Output, MAR, Recent Results and Cardiac Rhythm ST.

## 2022-01-16 NOTE — PROGRESS NOTES
Infectious Disease ProgressNote    Today's Date: 1/16/2022   Admit Date: 1/11/2022    Impression:   · E coli UTI (1/12/22); on Zosyn  · Acute respiratory failure with hypoxia; COVID 19 negative; Pulmonology following; 98 % on 3 lpm via nasal cannula  · Abnormal sinus CT; fungal etiology? · Asthma  · Type II DM; A1c 7.0  · CVA  · Obesity  · Left humeral head and neck fracture   · Tachycardia  · Stress Gastritis; coffee ground emesis; seen by GI  · Leukocytosis  · Norco overuse reported by EMS; found hypoxic at home by EMS; ? Aspiration vs oversedation    Plan:   · Continue Zosyn for now  · No change in mental status    Anti-infectives:   · IV Zosyn (1/13-  · IV Zithromax (1/13-1/15)  · IV Diflucan (1/15-1/15)    Subjective:   TMAX 100.2 WBC trending down. Still lethargic w/o meaningful response. Patient is a 57 y.o. female  with PMH of asthma, DM2, CVA, obesity, neuropathy, JESSICA on CPAP admitted s/p fall with recent left humeral fracture that occurred while on [employer]. Not seen by ortho to date. Records were Evaluated per ortho this admit and recommends CT left shoulder and outpatient followup with Dr. Renata Still. Found hypoxic and meets sepsis criteria due to fever / leukocytosis. Has CXR showing LLL pneumonia and UTI. COVID negative. She is on Zithromax and Zosyn. Started on Diflucan today. CT head yesterday showed: RIGHT MAXILLARY AND FRONTAL AND LEFT SPHENOID SINUSITIS WITH  HIGH-ATTENUATION COMPONENTS IN THE MAXILLARY SINUS SUGGESTING THE POSSIBILITY OF A FUNGAL ETIOLOGY. Initial CXR 1/11 showed; Mild left lower lobe atelectasis or pneumonia. Humeral head and neck fracture seen on XR L shoulder. ID is asked to evaluate patient for possible fungal sinus infection. Urine cx grew E coli and urine looked infected. Blood cx NGTD.       Patient Active Problem List   Diagnosis Code    Pneumonia J18.9    UTI (urinary tract infection) N39.0    Hypoxia R09.02    Obesity E66.9    Closed fracture of surgical neck of humerus S42.213A    Acute respiratory failure with hypoxia (HCC) J96.01    Sepsis (HCC) W50.0    Metabolic acidosis T30.6    Hematemesis with nausea K92.0    Hypernatremia E87.0    Encephalopathy G93.40     Past Medical History:   Diagnosis Date    Asthma     Diabetes mellitus ([address]     Gastroparesis 2021    GES at [address] History of CVA (cerebrovascular accident)     Neuropathy     Obesity     Sleep apnea       Family History   Problem Relation Age of Onset    Cancer Mother     COPD Father       Social History     Tobacco Use    Smoking status: Passive Smoke Exposure - Never Smoker    Smokeless tobacco: Never Used   Substance Use Topics    Alcohol use: Never     Past Surgical History:   Procedure Laterality Date    HX BREAST BIOPSY      HX  SECTION      HX COLONOSCOPY  2018    HX REFRACTIVE SURGERY      CA EGD DELIVER THERMAL ENERGY SPHNCTR/CARDIA GERD  2021     and sood dilation    CA EGD DELIVER THERMAL ENERGY SPHNCTR/CARDIA GERD  2021      Prior to Admission medications    Medication Sig Start Date End Date Taking? Authorizing Provider   naloxone (Narcan) 4 mg/actuation nasal spray Use 1 spray intranasally, then discard. Repeat with new spray every 2 min as needed for opioid overdose symptoms, alternating nostrils. 22  Yes Josie Louise MD   lisinopriL (PRINIVIL, ZESTRIL) 20 mg tablet Take 20 mg by mouth daily. Yes Provider, Historical   montelukast (Singulair) 10 mg tablet Take 10 mg by mouth daily. Yes Provider, Historical   aspirin delayed-release 81 mg tablet Take 81 mg by mouth daily. Yes Provider, Historical   amitriptyline (ELAVIL) 75 mg tablet Take 25 mg by mouth nightly as needed for Sleep. Yes Provider, Historical   sertraline (Zoloft) 100 mg tablet Take 100 mg by mouth daily. Yes Provider, Historical   pregabalin (Lyrica) 300 mg capsule Take 300 mg by mouth two (2) times a day.    Yes Provider, Historical   empagliflozin (JARDIANCE) 10 mg tablet Take 10 mg by mouth daily. Yes Provider, Historical   fluticasone propion-salmeteroL (Advair HFA) 115-21 mcg/actuation inhaler Take 2 Puffs by inhalation two (2) times a day. Yes Provider, Historical   fluticasone propionate (Flonase Allergy Relief) 50 mcg/actuation nasal spray 2 Sprays by Both Nostrils route daily. Yes Provider, Historical   omeprazole (PRILOSEC) 20 mg capsule Take 20 mg by mouth daily. Yes Provider, Historical   amLODIPine (Norvasc) 5 mg tablet Take 5 mg by mouth daily. Yes Provider, Historical   atorvastatin (Lipitor) 20 mg tablet Take 20 mg by mouth daily. Yes Provider, Historical   meclizine (ANTIVERT) 25 mg tablet Take 1 Tab by mouth three (3) times daily as needed for Dizziness or Nausea for up to 20 doses. 3/23/20   xOana Ramirez, DO   ondansetron (ZOFRAN ODT) 4 mg disintegrating tablet Take 1 Tab by mouth every eight (8) hours as needed for Nausea. 3/23/20   Oxana Ramirez, DO       Allergies   Allergen Reactions    Other Food Swelling     Shell fish    Contrast Agent [Iodine] Anaphylaxis    Banana Itching and Swelling     Mouth itches and swells        Review of Systems:  A comprehensive review of systems was negative except for that written in the History of Present Illness. Objective:     Visit Vitals  BP (!) 161/79   Pulse (!) 122   Temp 100.2 °F (37.9 °C)   Resp (!) 41   Ht 5' 2\" (1.575 m)   Wt 99.8 kg (220 lb)   SpO2 97%   BMI 40.24 kg/m²     Temp (24hrs), Av.7 °F (37.6 °C), Min:99 °F (37.2 °C), Max:100.2 °F (37.9 °C)       Lines:  Central Venous Catheter:       Physical Exam:    General:  Lethargic, well noursished, well developed, appears stated age   Eyes:  Sclera anicteric. Pupils equally round and reactive to light.    Mouth/Throat: Mucous membranes dry, dry blood oral cavity, poor dentition   Neck: Supple   Lungs:   Clear to auscultation bilaterally, good effort   CV:  Regular rate and rhythm,no murmur, click, rub or gallop   Abdomen:   Soft, non-tender. bowel sounds normal. non-distended   Extremities: No cyanosis, bilateral LE edema   Skin: Skin color, texture, turgor normal. no acute rash or lesions   Lymph nodes: Cervical and supraclavicular normal   Musculoskeletal: No swelling or deformity   Lines/Devices:  Intact, no erythema, drainage or tenderness   Psych: Awake and lethargic; no response to command; no verbal responses       Data Review:     CBC:  Recent Labs     01/16/22  0334 01/15/22  0331 01/14/22  1818 01/14/22  0243 01/14/22  0243   WBC 14.4* 17.0*  --   --  17.7*   HGB 13.3 12.4 12.8   < > 12.6   HCT 42.2 38.4 39.2   < > 39.7    469*  --   --  497*    < > = values in this interval not displayed. BMP:  Recent Labs     01/16/22 0334 01/15/22  2210 01/15/22  1616 01/15/22  0850 01/15/22  0850 01/15/22  0331 01/15/22  0331 01/15/22  0053 01/15/22  0053   CREA 0.57*  --   --   --   --   --  0.70  --  0.79   BUN 14  --   --   --   --   --  13  --  14   *  148* 148* 148*   < > 152*   < > 152*   < > 153*   K 3.5  --  3.1*  --  3.2*   < > 2.7*   < > 2.8*   *  --   --   --   --   --  117*  --  119*   CO2 19*  --   --   --   --   --  27  --  26   AGAP 18*  --   --   --   --   --  8  --  8   *  --   --   --   --   --  164*  --  193*    < > = values in this interval not displayed. LFTS:  No results for input(s): TBILI, ALT, AP, TP, ALB in the last 72 hours.     No lab exists for component: SGOT    Microbiology:     All Micro Results     Procedure Component Value Units Date/Time    CULTURE, BLOOD [[id number]] Collected: 01/12/22 1249    Order Status: Completed Specimen: Blood Updated: 01/16/22 4423     Special Requests: --        RIGHT  FOREARM       Culture result: NO GROWTH 4 DAYS       CULTURE, BLOOD [[id number]] Collected: 01/12/22 0229    Order Status: Completed Specimen: Blood Updated: 01/16/22 7604     Special Requests: --        RIGHT  Antecubital       Culture result: NO GROWTH 4 DAYS       FUNGUS CULTURE AND SMEAR [[id number]] Collected: 01/15/22 1608    Order Status: Canceled Specimen: Other     FUNGUS CULTURE AND SMEAR [[id number]] Collected: 01/15/22 1517    Order Status: Canceled Specimen: Other     CULTURE, URINE [[id number]]  (Abnormal)  (Susceptibility) Collected: 01/12/22 0227    Order Status: Completed Specimen: Urine from Clean catch Updated: 01/15/22 0733     Special Requests: NO SPECIAL REQUESTS        Culture result:       >100,000 COLONIES/mL ESCHERICHIA COLI                  50,000-100,000 COLONIES/mL NORMAL SKIN SNOW ISOLATED          COVID-19 RAPID TEST [[id number]] Collected: 01/12/22 9447    Order Status: Completed Specimen: Nasopharyngeal Updated: 01/12/22 0320     Specimen source NASAL        COVID-19 rapid test Not detected        Comment:      The specimen is NEGATIVE for SARS-CoV-2, the novel coronavirus associated with COVID-19. A negative result does not rule out COVID-19. This test has been authorized by the FDA under an Emergency Use Authorization (EUA) for use by authorized laboratories.         Fact sheet for Healthcare Providers: kstattoo.com  Fact sheet for Patients: Tappr.com       Methodology: Isothermal Nucleic Acid Amplification               Imaging:   Reviewed and outlined in H&P    Signed By: Wolf Webster NP     January 16, 2022

## 2022-01-16 NOTE — PROGRESS NOTES
2022    Admit Date: 2022    Subjective:   CHIEF COMPLAINT- emesis  HPI      Overall-poor           Diet-none    Appetite-na     Nausea-no   Vomiting-no          Pain-no            BM-no   Bleeding-no    Medications-reviewed and adjusted as appropriate   IV FLUIDS-reviewed      FAM HX-per H&P   SH-per H&P       Past Medical History:   Diagnosis Date    Asthma     Diabetes mellitus ([address]     Gastroparesis 2021    GES at [address] 64 History of CVA (cerebrovascular accident)     Neuropathy     Obesity     Sleep apnea                Past Surgical History:   Procedure Laterality Date    HX BREAST BIOPSY      HX  SECTION      HX COLONOSCOPY  2018    HX REFRACTIVE SURGERY      [place] EGD DELIVER THERMAL ENERGY SPHNCTR/CARDIA GERD  2021     and sood dilation    NH EGD DELIVER THERMAL ENERGY SPHNCTR/CARDIA GERD  2021       ROS--                 RESP-sob            CARDIAC-neg                       -neg             Further ROS as per PMH and PSH- see problem list                            Objective:     Visit Vitals  BP (!) 161/79   Pulse (!) 122   Temp 100.2 °F (37.9 °C)   Resp (!) 41   Ht 5' 2\" (1.575 m)   Wt 99.8 kg (220 lb)   SpO2 98%   BMI 40.24 kg/m²         Intake/Output Summary (Last 24 hours) at 2022 0948  Last data filed at 2022 0715  Gross per 24 hour   Intake 2353.94 ml   Output 3200 ml   Net -846.06 ml       EXAM:     NEURO-no response except for weak     HEENT-wnl    LUNGS-clear       COR-rrr        ABD-obese ,soft , min tenderness, no rebound, good bs        EXT-no edema                         LABS-  Lab Results   Component Value Date/Time    WBC 14.4 (H) 2022 03:34 AM    RBC 4.93 2022 03:34 AM    HGB 13.3 2022 03:34 AM    HCT 42.2 2022 03:34 AM    PLATELET 004  03:34 AM     Lab Results   Component Value Date/Time    Sodium 148 (H) 2022 03:34 AM    Sodium 148 (H) 2022 03:34 AM    Potassium 3.5 01/16/2022 03:34 AM    Chloride 111 (H) 01/16/2022 03:34 AM    CO2 19 (L) 01/16/2022 03:34 AM    Anion gap 18 (H) 01/16/2022 03:34 AM    Glucose 167 (H) 01/16/2022 03:34 AM    BUN 14 01/16/2022 03:34 AM    Creatinine 0.57 (L) 01/16/2022 03:34 AM    GFR est AA >60 01/16/2022 03:34 AM    GFR est non-AA >60 01/16/2022 03:34 AM    Calcium 9.2 01/16/2022 03:34 AM    Magnesium 2.5 (H) 01/15/2022 03:31 AM    Phosphorus 2.2 (L) 01/14/2022 08:38 AM    Albumin 2.9 (L) 01/12/2022 02:29 AM    Bilirubin, total 0.8 01/12/2022 02:29 AM    Protein, total 6.9 01/12/2022 02:29 AM    Globulin 4.0 (H) 01/12/2022 02:29 AM    A-G Ratio 0.7 (L) 01/12/2022 02:29 AM    ALT (SGPT) 12 01/12/2022 02:29 AM             Assessment:     Principal Problem:    Sepsis ([address] (1/12/2022)    Active Problems:    Pneumonia (1/12/2022)      UTI (urinary tract infection) (1/12/2022)      Hypoxia (1/12/2022)      Obesity (1/12/2022)      Closed fracture of surgical neck of humerus (1/12/2022)      Acute respiratory failure with hypoxia (HCC) (9/26/6755)      Metabolic acidosis (3/35/6461)      Hematemesis with nausea (1/15/2022)      Hypernatremia (1/15/2022)      Encephalopathy (1/15/2022)    no evidence of GI bleed   Encephalopathic - not hepatic  Stress gastritis    Plan:     Cont ppi   consider feeding tube until mental status improves  Please call if needed   Follow up with primary GI- Josue      PT SEEN AND EXAMINED AND PLAN DISCUSSED AND IMPLEMENTED.   Vaibhav Valverde MD

## 2022-01-17 NOTE — PROGRESS NOTES
01/16/22 2101   Oxygen Therapy   O2 Sat (%) 100 %   Pulse via Oximetry 99 beats per minute   O2 Device BIPAP   Respiratory   Respiratory (WDL) X   CPAP/BIPAP   CPAP/BIPAP Start/Stop On   Device Mode BIPAP   Mask Type and Size Full face; Medium   Skin Condition intact   PIP Observed 14 cm H20   IPAP (cm H2O) 14 cm H2O   EPAP (cm H2O) 8 cm H2O   Inspiratory Time (sec) 0.9 seconds   Vt Spont (ml) 332 ml   Ve Observed (l/min) 8.2 l/min   Backup Rate 18   Total RR (Spontaneous) 25 breaths per minute   Insp Rise Time (sec) 3   Leak (Estimated) 0 L/min   Pt's Home Machine No   Biomedical Check Performed Yes   Settings Verified Yes   Alarm Settings   High Pressure 35   Low Pressure 5   Apnea 20   Low Ve 3   High Rate 50   Low Rate 8   Pulmonary Toilet   Pulmonary Toilet Cough and deep breath;H. O.B elevated

## 2022-01-17 NOTE — PROGRESS NOTES
[facility] Critical Care Note[de-identified] 1/17/2022  Breanna Moreno  Admission Date: 1/11/2022     Length of Stay: 5 days    Background: 57 y. o. female presented after a falling right hip. She fell over a week ago and injuring her shoulder. She has been on norco. XR hip without acute process. She has a left humeral fracture that occurred while on [employer]. Not seen by ortho to date until here. Records were evaluated per ortho this admit and recommends CT left shoulder and outpatient followup with Dr. Brent Monge. She is wearing a sling.  She has underlying asthma, DM2, CVA, obesity, neuropathy, and  JESSICA on CPAP.      She has been hypoxic and CXR shows some atelectasis/infiltrates. + E coli UTI and she is on zosyn. Her BS was > 1000 - suspected DKA. Insulin drip initiated with hydration with improvement. Bicarb used with correction of acidosis. Notable PMH:  has a past medical history of Asthma, Diabetes mellitus ([facility] [address] Gastroparesis (06/28/2021), History of CVA (cerebrovascular accident), Neuropathy, Obesity, and Sleep apnea. 24 Hour events: placed back on insulin and BIPAP yesterday for recurrent DKA, metabolic acidosis after off insulin gtt. Now corrected again. COVID was rechecked and was positive and moved to CCU for ongoing care. Was on CPAP now at 30% with sats 100%. She is alert, but remains non-verbal.     ROS: unable to obtain/negative except as listed elsewhere. Lines: (insertion date)   Nair: (1/13)  Central line: (1/13)    Drips: current dose (range)      Pertinent Exam:         Blood pressure 125/80, pulse (!) 101, temperature 99.4 °F (37.4 °C), resp. rate 22, height 5' 2\" (1.575 m), weight 220 lb (99.8 kg), SpO2 100 %. Intake/Output Summary (Last 24 hours) at 1/17/2022 0807  Last data filed at 1/17/2022 0451  Gross per 24 hour   Intake 2897.32 ml   Output 2075 ml   Net 822.32 ml     Constitutional:  in resp.  distress  EENMT:  Sclera clear, pupils equal, oral mucosa moist  Respiratory:  rhonchi  Cardiovascular:  RRR  Gastrointestinal:  soft with no tenderness; positive bowel sounds present  Musculoskeletal:  warm with no cyanosis, no lower extremity edema  Skin:  no jaundice or ecchymosis  Neurologic:awake but does not follow commands, non verbal  Psychiatric: awake, alert, but not following commands and non-verbal    CXR: 1/16: clear      Recent Labs     01/17/22  0452 01/16/22  0334 01/15/22  0331   WBC 12.2* 14.4* 17.0*   HGB 12.5 13.3 12.4   HCT 39.3 42.2 38.4    434 469*     Recent Labs     01/17/22  0452 01/17/22  0041 01/16/22  1815 01/16/22  1637 01/16/22  1407   * 148* 151*   < > 147*   K 3.7 3.4* 2.9*   < > 2.9*   * 112* 115*   < > 112*   CO2 28 27 21   < > 13*   * 199* 149*   < > 280*   BUN 22 21 23   < > 24*   CREA 0.63 0.65 0.72   < > 0.68   MG 1.7* 2.9* 1.9   < > 2.0   CA 8.6 8.7 8.9   < > 8.9   PHOS  --   --   --   --  4.0*    < > = values in this interval not displayed. No results for input(s): LAC, TROPHS, BNPNT, CRP in the last 72 hours. No lab exists for component: ESR  Recent Labs     01/17/22  0727 01/17/22  0501 01/17/22  0400   GLUCPOC 187* 194* 188*     ECHO: No results found for this or any previous visit. Results     Procedure Component Value Units Date/Time    SARS-COV-2, PCR [[id number]]  (Abnormal) Collected: 01/16/22 0808    Order Status: Completed Specimen: Nasopharyngeal Updated: 01/16/22 1645     Specimen source Nasopharyngeal        SARS-CoV-2 Detected        Comment:      The specimen is POSITIVE for SARS-CoV-2, the novel coronavirus associated with COVID-19. This test has been authorized by the FDA under an Emergency Use Authorization (EUA) for use by authorized laboratories.         Fact sheet for Healthcare Providers: ConventionUpdate.co.nz  Fact sheet for Patients: https://fda.gov/media/324034/download       Methodology: RT-PCR  RESULTS VERIFIED, PHONED TO AND READ BACK BY  TAMRA Wade RN ON 01/16/22 @1645, ADS         FUNGUS CULTURE AND SMEAR [[id number]] Collected: 01/15/22 1608    Order Status: Canceled Specimen: Other     FUNGUS CULTURE AND SMEAR [[id number]] Collected: 01/15/22 1517    Order Status: Canceled Specimen: Other     CULTURE, BLOOD [[id number]] Collected: 01/12/22 1249    Order Status: Completed Specimen: Blood Updated: 01/17/22 0801     Special Requests: --        RIGHT  FOREARM       Culture result: NO GROWTH 5 DAYS       CULTURE, BLOOD [[id number]] Collected: 01/12/22 0229    Order Status: Completed Specimen: Blood Updated: 01/17/22 0801     Special Requests: --        RIGHT  Antecubital       Culture result: NO GROWTH 5 DAYS       COVID-19 RAPID TEST [[id number]] Collected: 01/12/22 0227    Order Status: Completed Specimen: Nasopharyngeal Updated: 01/12/22 0320     Specimen source NASAL        COVID-19 rapid test Not detected        Comment:      The specimen is NEGATIVE for SARS-CoV-2, the novel coronavirus associated with COVID-19. A negative result does not rule out COVID-19. This test has been authorized by the FDA under an Emergency Use Authorization (EUA) for use by authorized laboratories.         Fact sheet for Healthcare Providers: ConventionUpdate.co.nz  Fact sheet for Patients: ConventionUpdate.co.nz       Methodology: Isothermal Nucleic Acid Amplification         CULTURE, URINE [[id number]]  (Abnormal)  (Susceptibility) Collected: 01/12/22 0227    Order Status: Completed Specimen: Urine from Clean catch Updated: 01/15/22 0733     Special Requests: NO SPECIAL REQUESTS        Culture result:       >100,000 COLONIES/mL ESCHERICHIA COLI                  50,000-100,000 COLONIES/mL NORMAL SKIN SNOW ISOLATED          Susceptibility      Escherichia coli     JAMIL     Ampicillin ($) Resistant     Ampicillin/sulbactam ($) Resistant     Aztreonam ($$$$) Susceptible     Cefazolin ($) Resistant     Cefepime ($$) Susceptible     Ceftriaxone ($) Susceptible     Gentamicin ($) Susceptible     Nitrofurantoin Susceptible     Piperacillin/Tazobac ($) Susceptible     Tobramycin ($) Susceptible     Trimeth-Sulfamethoxa Susceptible                  Linear View                       Inpat Anti-Infectives (From admission, onward)     Start     Ordered Stop    01/15/22 1700  cefTRIAXone (ROCEPHIN) 1 g in 0.9% sodium chloride (MBP/ADV) 50 mL MBP  1 g,   IntraVENous,   EVERY 24 HOURS         01/15/22 1649 01/20/22 1659    01/13/22 1800  nystatin (MYCOSTATIN) 100,000 unit/gram powder  Topical,   2 TIMES DAILY         01/13/22 1203 --              Ventilator Settings:  Ideal body weight: 50.1 kg (110 lb 7.2 oz)   Mode FIO2 Rate Tidal Volume Pressure PEEP      30 %               Peak airway pressure:         Minute ventilation: 10.9 l/min  ABG:  Recent Labs     01/17/22  0534 01/16/22  0834 01/15/22  0353   PHI 7.51* 7.45 7.50*   PCO2I 30.8* 17.4* 32.2*   PO2I 124* 83 96   HCO3I 24.9 12.2* 25.2     Assessment and Plan:  (Medical Decision Making)   Impression: 57 y.o. female with recent fall and left humeral fx. Wearing sling and scheduled for outpatient follow up with Dr. Bandar Lou. BS > 1000 so insulin gtt initiated in the ICU. Required transfer to the ICU for tx of DKA/acidosis. Required bipap short term due to mental status. Hospitalist managing DM. CT head on 1/14 negative. + e coli UTI - on Zosyn.      NEURO:  awake but non-verbal , not following commands  Acute metabolic encephalopathy: acidosis, UTI, COVID? CV:   Volume Status: -6L since admit  PULM:   Acute hypoxemic/hypercapneic respiratory failure: acidosis corrected, can try off BIPAP to NC. JESSICA: CPAP QHS, either home or hospital machine pending O2 requirements  RENAL:  Metabolic acidosis: resolved, stop bicarb gtt  Electrolytes: Na 149- on D5W. K+ supplemented, off bicarb gtt. GI:   Nutrition: none at present.  Will need to hold po diet until she is more alert  HEME: Anticoagulation: low dose lovenox  ID:   E coli UTI - on zosyn which organism is sensitive to. Completed Azithro  ENDO:   DM: gap closed, stop bicarb gtt, start lantus 20, Lispro Q4 resistent dosing. Stop insulin gtt and monitor. Skin: no decub, turns, preventive care  Prophy: lovenox/protonix    Full Code     Primary will updated family.     Izt Daigle MD

## 2022-01-17 NOTE — ACP (ADVANCE CARE PLANNING)
Advance Care Planning     General Advance Care Planning (ACP) Conversation      Date of Conversation: 1/11/2022      Healthcare Decision Maker:     Primary Decision Maker: pako donnelly - Spouse - [phone]  Click here to complete [place] including selection of the Healthcare Decision Maker Relationship (ie \"Primary\")      Today we documented Decision Maker(s) consistent with Legal Next of Kin hierarchy. Content/Action Overview:   No LW/HCPOA on file currently. Spouse, legal NOK. Full code per MD orders.      Length of Voluntary ACP Conversation in minutes:  <16 minutes (Non-Billable)    Letty Rizzo RN

## 2022-01-17 NOTE — PROGRESS NOTES
LTG: Patient will tolerate least restrictive diet without overt signs or symptoms of airway compromise. STG: Patient will tolerate po trials with SLP only without overt signs or symptoms of airway compromise. STG: Patient will participate in modified barium swallow study as clinically indicated. SPEECH LANGUAGE PATHOLOGY: DYSPHAGIA- Initial Assessment    NAME/AGE/GENDER: Darrius Best is a 57 y.o. female  DATE: 1/17/2022  PRIMARY DIAGNOSIS: Pneumonia [J18.9]      ICD-10: Treatment Diagnosis: R13.12 Dysphagia, Oropharyngeal Phase    RECOMMENDATIONS   DIET:    NPO    MEDICATIONS: Non-oral     PRECAUTIONS, MODIFICATIONS, AND STRATEGIES  · Oral care every 3 hours  ·      RECOMMENDATIONS FOR CONTINUED SPEECH THERAPY:   YES: Anticipate need for ongoing speech therapy during this hospitalization. ASSESSMENT   Patient presents with oropharyngeal dysphagia characterized by impaired oral acceptance, prolonged oral holding, and incoordinated swallow which resulted in overt s/sx of airway compromise with thin liquids. O2 dropped significantly after suspected aspiration event and she required several minutes to recover. Additional trials deferred due to severity of response with minimal sips of thin liquids. Recommend NPO with non-oral medication. Speech will continue to follow regarding dysphagia management and possible cognitive-linguistic assessment if responsiveness does not improve. EDUCATION:  · Recommendations discussed with Patient and RN    REHABILITATION POTENTIAL FOR STATED GOALS: Good    PLAN    FREQUENCY/DURATION:   Continue to follow patient 3 times a week for duration of hospital stay to address above goals.  Recommendations for next treatment session: Next treatment session will address diet tolerance and po trials    CONTINUATION OF SKILLED SERVICES/MEDICAL NECESSITY:   Patient is expected to demonstrate progress in  swallow strength, swallow timeliness, swallow function, diet tolerance and swallow safety in order to  improve swallow safety, work toward diet advancement and decrease aspiration risk.  Patient continues to require skilled intervention due to dysphagia. .       SUBJECTIVE   Patient awake with head reflexed and turned to the right. Facial grimacing when attempting to reposition in neutral position. Non-verbal, but nodding head in response to questions. She was noted to state \"uh huh\" once during session     Oxygen Device: nasal cannula  Pain: Pain Scale 1: Adult Nonverbal Pain Scale  Pain Intensity 1: 0    History of Present Injury/Illness: Ms. Dwaine Amaya  has a past medical history of Asthma, Diabetes mellitus ([facility] [address] Gastroparesis (2021), History of CVA (cerebrovascular accident), Neuropathy, Obesity, and Sleep apnea. . She also  has a past surgical history that includes hx  section; hx breast biopsy; hx refractive surgery; pr egd deliver thermal energy sphnctr/cardia gerd (2021); hx colonoscopy (2018); and pr egd deliver thermal energy sphnctr/cardia gerd (2021). PRECAUTIONS/ALLERGIES: Other food, Contrast agent [iodine], and Banana     Problem List:  (Impairments causing functional limitations)  1. Oral dysphagia  2. Resumed dysphagia  3. Impaired expressive language abilities. Previous Dysphagia: NONE REPORTED  Diet Prior to Evaluation: NPO    Orientation:  Unable to state    Cognitive-Linguistic Screening:   Alertness  o Alert   Speech Production:   o No communication attempts   Expressive Language:  o No communication attempts   Receptive Language:  o Answers yes/no questions with head nod   Cognition:   o Needs further assessment  Prior Level of Function: Independent at baseline. . Currently non-verbal, but will follow basic commands. Recommendations: Given results of screening, further evaluation is indicated to assess speech, language, and cognitive-linguistic abilities. .    OBJECTIVE   Oral Motor:   · Labial: No gross asymmetries. Primarily maintaining open mouth postures  · Dentition: Natural and Limited  · Oral Hygiene: Dry and Dried secretions along teeth and lips  · Lingual: Unable to fully assess  · COMMENTS: Oral care provided with suction prior to po trials. Dysphagia evaluation:   Patient consumed trials of ice chip and thin liquids by tsp only. She was orally resistant initially, but acceptance improved with additional trial. She required explicit commands to open mouth for acceptance, close mouth for bolus containment, and to initiate swallow. Ice chips x3 tolerated without overt difficulty. Bolus holding noted with 2 tsp sips of water. She followed commands for swallow, but exhibited strong coughing on second trial. Facial redness, watery eyes, and O2 dropping from 100% to 76% after incident. She required several minutes of deep breathing before O2 sats recovered. All additional trials deferred. Patient nodded head in agreement with plan. Tool Used: Dysphagia Outcome and Severity Scale (SORAIDA)    Score Comments   Normal Diet  [] 7 With no strategies or extra time needed   Functional Swallow  [] 6 May have mild oral or pharyngeal delay   Mild Dysphagia  [] 5 Which may require one diet consistency restricted    Mild-Moderate Dysphagia  [] 4 With 1-2 diet consistencies restricted   Moderate Dysphagia  [] 3 With 2 or more diet consistencies restricted   Moderate-Severe Dysphagia  [] 2 With partial PO strategies (trials with ST only)   Severe Dysphagia  [] 1 With inability to tolerate any PO safely      Score:  Initial: 1 Most Recent: x (Date 01/17/22 )   Interpretation of Tool: The Dysphagia Outcome and Severity Scale (SORAIDA) is a simple, easy-to-use, 7-point scale developed to systematically rate the functional severity of dysphagia based on objective assessment and make recommendations for diet level, independence level, and type of nutrition.        Current Medications:   No current facility-administered medications on file prior to encounter. Current Outpatient Medications on File Prior to Encounter   Medication Sig Dispense Refill    lisinopriL (PRINIVIL, ZESTRIL) 20 mg tablet Take 20 mg by mouth daily.  montelukast (Singulair) 10 mg tablet Take 10 mg by mouth daily.  aspirin delayed-release 81 mg tablet Take 81 mg by mouth daily.  amitriptyline (ELAVIL) 75 mg tablet Take 25 mg by mouth nightly as needed for Sleep.  sertraline (Zoloft) 100 mg tablet Take 100 mg by mouth daily.  pregabalin (Lyrica) 300 mg capsule Take 300 mg by mouth two (2) times a day.  empagliflozin (JARDIANCE) 10 mg tablet Take 10 mg by mouth daily.  fluticasone propion-salmeteroL (Advair HFA) 115-21 mcg/actuation inhaler Take 2 Puffs by inhalation two (2) times a day.  fluticasone propionate (Flonase Allergy Relief) 50 mcg/actuation nasal spray 2 Sprays by Both Nostrils route daily.  omeprazole (PRILOSEC) 20 mg capsule Take 20 mg by mouth daily.  amLODIPine (Norvasc) 5 mg tablet Take 5 mg by mouth daily.  atorvastatin (Lipitor) 20 mg tablet Take 20 mg by mouth daily.  meclizine (ANTIVERT) 25 mg tablet Take 1 Tab by mouth three (3) times daily as needed for Dizziness or Nausea for up to 20 doses. 20 Tab 1    ondansetron (ZOFRAN ODT) 4 mg disintegrating tablet Take 1 Tab by mouth every eight (8) hours as needed for Nausea.  8 Tab 2        INTERDISCIPLINARY COLLABORATION: RN    After treatment position/precautions:  · Upright in bed  · RN notified    Total Treatment Duration:   Time In: 7217  Time Out: [address] [facility], [facility], [facility], [facility]  Speech Language Pathologist  Acute Rehabilitation Services  Contact: Chriss

## 2022-01-17 NOTE — PROGRESS NOTES
Verbal transfer report received from  [facility] for lateral transfer. Report included the following information SBAR, Kardex, Intake/Output, MAR, Recent Results, Med Rec Status and Cardiac Rhythm NSR.      Dual verification of gtts completed (name of gtts verified): insulin

## 2022-01-17 NOTE — PROGRESS NOTES
TRANSFER - OUT REPORT:    Verbal report given to Stacie Goff RN on Mary Carmen Tinoco being transferred to CCU for routine progression of care       Report consisted of patients Situation, Background, Assessment and Recommendations (SBAR). Information from the following report(s) SBAR, ED Summary, Intake/Output, MAR, Recent Results, Med Rec Status and Cardiac Rhythm NSR was reviewed with the receiving nurse. Opportunity for questions and clarification was provided.

## 2022-01-17 NOTE — PROGRESS NOTES
Bedside and verbal shift change report received from  Toshia Sweeney (offgoing nurse). Report included the following information SBAR, Kardex, Intake/Output, MAR, Recent Results, Med Rec Status and Cardiac Rhythm NSR.      Dual skin assessment completed at bedside: abrasion on left knee (list pertinent skin assessment findings)    Dual verification of gtts completed (name of gtts verified): none

## 2022-01-17 NOTE — PROGRESS NOTES
Hospitalist Progress Note   Admit Date:  2022 10:04 PM   Name:  Robbie Espinoza   Age:  57 y.o. Sex:  female  :  1960   MRN:  [id number]   Room:  [address] [place] [address]    Presenting Complaint: Fall    Reason(s) for Admission: Pneumonia [J18.9]     Hospital Course & Interval History:     Ms. Peterson Olivier is a 56 yo female with PMH of asthma, DM2, CVA, obesity, neuropathy, JESSICA on CPAP admitted s/p fall with recent left humeral fracture that occurred [place] on [employer]. Ortho consulted and and recommends CT left shoulder and outpatient followup with Dr. Miles Lindquist. Patient also found hypoxic and meets sepsis criteria due to fever / leukocytosis. Has CXR showing LLL pneumonia and UTI. COVID negative. She was started on empiric antibiotic. Patient was found to be in diabetic ketoacidosis on  and started on glucose stabilizer with improvement. Patient placed back on insulin gtt. and bicarb for recurrent DKA on . Patient initially required BiPAP and then transition to nasal cannula. Patient started spiking high-grade fevers on .  notified family member positive for COVID. Patient was rechecked for COVID and came positive. CT head was done for concern for confusion and showed no strokes but there is a concern for a fungal right maxillary sinus infection. She was started on fluconazole. Infectious disease consulted and recommend no invasion of bone on imaging and no finding on skin, eye or palate to suggest Mucor. She could possibly have an Aspergillus chronic sinusitis, and recommend to discontinue fluconazole and continue to treat E. coli UTI. Fungitell and Aspergillus antigen from serum ordered. Patient had an episode of coffee-ground emesis on 1/15. GI consulted and recommend continue PPI. Subjective (22): Patient seen and evaluated. More alert today as compared to yesterday. Opening her eyes and follow some commands. Not answering any questions. Anion gap has closed. Acidosis resolved. Discontinue bicarb gtt. and transition to subcu insulin regimen. Off BiPAP and on 4 L nasal cannula. I tried to call  couple of time with no answer. Assessment & Plan:     Diabetic ketoacidosis/diabetes mellitus:  A1c 7 on 1/14  Found in diabetic ketoacidosis on 1/13, started on glucose stabilizer, bicarb gtt. and insulin gtt. with correction. Placed back on insulin GTT and bicarb for recurrent DKA on 1/16 1/17: Anion gap has closed. Acidosis resolved. Discontinue bicarb gtt. Transition patient to subcu insulin regimen and discontinue insulin gtt. in 2 hours. Monitor BMP daily. Acute respiratory failure with hypoxia  Sepsis:  COVID-19 pneumonia:  1/17:   COVID-positive on 1/16. Off BiPAP now  Currently on 4 L nasal cannula, wean as tolerated  Continue CPAP at night  Cultures till date negative  Check inflammatory markers  Start patient on Decadron 6 mg daily for 10 days  Supportive care    Encephalopathy:  Metabolic versus Covid induce  Metabolic acidosis resolved  Continue to treat COVID as above  Frequent reorientation  Delirium precautions  Avoid sedatives    Right maxillary sinus fungal infection:  CT head was done for concern for confusion and showed no strokes but there is a concern for a fungal right maxillary sinus infection. She was started on fluconazole. Infectious disease consulted and recommend no invasion of bone on imaging and no finding on skin, eye or palate to suggest Mucor. She could possibly have an Aspergillus chronic sinusitis, and recommend to discontinue fluconazole and continue to treat E. coli UTI. Fungitell and Aspergillus antigen from serum ordered. UTI . Urine culture positive for E.  Coli  Continue ceftriaxone, EOT 1/19     Closed fracture of surgical neck of humerus  Outpatient ortho  Supportive care   PT,OT    Prior CVA:  Continued asa, lipitor    Coffee-ground emesis  GI recommend continue Protonix        Dispo/Discharge Planning: TBD    Diet: N.p.o. for now given altered mental status  DVT PPx: lovenox  Code status: Full Code    Hospital Problems as of 1/17/2022 Never Reviewed          Codes Class Noted - Resolved POA    Hematemesis with nausea ICD-10-CM: K92.0  ICD-9-CM: 578.0, 787.02  1/15/2022 - Present No        Hypernatremia ICD-10-CM: E87.0  ICD-9-CM: 276.0  1/15/2022 - Present No        Encephalopathy ICD-10-CM: G93.40  ICD-9-CM: 348.30  1/15/2022 - Present Unknown        Metabolic acidosis JUV-22-HU: E87.2  ICD-9-CM: 276.2  1/13/2022 - Present No        Pneumonia ICD-10-CM: J18.9  ICD-9-CM: 450  1/12/2022 - Present Yes        UTI (urinary tract infection) ICD-10-CM: N39.0  ICD-9-CM: 599.0  1/12/2022 - Present Yes        Hypoxia ICD-10-CM: R09.02  ICD-9-CM: 799.02  1/12/2022 - Present Yes        Obesity (Chronic) ICD-10-CM: E66.9  ICD-9-CM: 278.00  1/12/2022 - Present Yes        Closed fracture of surgical neck of humerus ICD-10-CM: S42.213A  ICD-9-CM: 812.01  1/12/2022 - Present Yes        Acute respiratory failure with hypoxia [facility]) ICD-10-CM: J96.01  ICD-9-CM: 518.81  1/12/2022 - Present Yes        * (Principal) Sepsis ([facility] [address] ICD-10-CM: A41.9  ICD-9-CM: 038.9, 995.91  1/12/2022 - Present Yes              Objective:     Patient Vitals for the past 24 hrs:   Temp Pulse Resp BP SpO2   01/17/22 1200  98 20 (!) 120/57 95 %   01/17/22 1130  (!) 107   98 %   01/17/22 1100 98.1 °F (36.7 °C) (!) 101 24 131/73 99 %   01/17/22 1045  (!) 101 26 135/74 98 %   01/17/22 1030  (!) 104 25 137/72 100 %   01/17/22 1015  (!) 107 18 (!) 140/77 100 %   01/17/22 1000  99 20 139/72 97 %   01/17/22 0945  (!) 105 26 (!) 145/85 100 %   01/17/22 0930  (!) 104 27 (!) 141/69 99 %   01/17/22 0915  (!) 101 17 (!) 149/71 100 %   01/17/22 0900  (!) 105 26 (!) 121/58 100 %   01/17/22 0845  (!) 102 19 122/64 99 %   01/17/22 0830  (!) 105 23 112/63 100 %   01/17/22 0823     100 %   01/17/22 0815  100 18 111/62 100 %   01/17/22 0800 97.3 °F (36.3 °C) (!) 105 21 127/84 100 %   01/17/22 0745  (!) 104 22 125/64 100 %   01/17/22 0730  (!) 104 17 (!) 120/58 100 %   01/17/22 0622  (!) 101 22 125/80 96 %   01/17/22 0603  97 16 (!) 141/75 99 %   01/17/22 0542  (!) 101 19 124/65 100 %   01/17/22 0522  96 18 (!) 120/59 100 %   01/17/22 0503  99 18 (!) 112/55 100 %   01/17/22 0451 99.4 °F (37.4 °C) 97 18 114/65 100 %   01/17/22 0442  (!) 104 20 114/65 99 %   01/17/22 0422    110/69 100 %   01/17/22 0403  (!) 104 11 136/71 100 %   01/17/22 0343     100 %   01/17/22 0342  96 (!) 4 (!) 142/75 100 %   01/17/22 0322  (!) 101 11 (!) 140/84 100 %   01/17/22 0302  98 12 134/64 100 %   01/17/22 0222  95 19 126/67 99 %   01/17/22 0202  94 19 119/70 100 %   01/17/22 0142  (!) 101 19 130/74 100 %   01/17/22 0122  100 19 121/70 100 %   01/17/22 0102  98 19 123/73 100 %   01/17/22 0042  100 17 133/69 100 %   01/17/22 0022  98 18 127/72 100 %   01/17/22 0002  (!) 108 16 129/89 100 %   01/16/22 2356     100 %   01/16/22 2342  (!) 109 18 129/86 100 %   01/16/22 2337 99.2 °F (37.3 °C) (!) 103 18 129/86 100 %   01/16/22 2322  96 16 124/67 100 %   01/16/22 2302  98 18 137/70 100 %   01/16/22 2242  (!) 104 21 122/76 100 %   01/16/22 2222  97 18 129/69 100 %   01/16/22 2202  (!) 101 20 (!) 131/90 100 %   01/16/22 2142  (!) 103 18 127/77 100 %   01/16/22 2122  (!) 101 18 120/74 100 %   01/16/22 2102  98 14 (!) 109/58 100 %   01/16/22 2101     100 %   01/16/22 2022  98 16 126/60 100 %   01/16/22 2001  100 18 116/67 100 %   01/16/22 1953 98.5 °F (36.9 °C) (!) 102 19 125/87 100 %   01/16/22 1942  (!) 102 16 125/87 100 %   01/16/22 1923  97 11 109/79 100 %   01/16/22 1902  100 21 124/83 100 %   01/16/22 1815  (!) 102 19 136/81 100 %   01/16/22 1800  (!) 101 22 (!) 150/75 100 %   01/16/22 1752 98.8 °F (37.1 °C)       01/16/22 1745  (!) 103 23 136/81 100 %   01/16/22 1715  (!) 102 19 129/85 100 %   01/16/22 1602  (!) 107 26 111/68 100 % 01/16/22 1546  (!) 104 28 109/68 98 %   01/16/22 1536 100.2 °F (37.9 °C) (!) 109 (!) 34 119/65 100 %   01/16/22 1506  (!) 107 (!) 38 108/75 92 %   01/16/22 1446  (!) 112 (!) 38 135/74 99 %   01/16/22 1439     95 %   01/16/22 1426  (!) 109 (!) 40 120/84 94 %   01/16/22 1406  (!) 110 (!) 39 124/81 99 %   01/16/22 1346  (!) 112 (!) 44 139/70 99 %   01/16/22 1326 (!) 102 °F (38.9 °C) (!) 112 (!) 40 138/67 99 %   01/16/22 1306  (!) 113 (!) 44 115/82 98 %     Oxygen Therapy  O2 Sat (%): 95 % (01/17/22 1200)  Pulse via Oximetry: 99 beats per minute (01/17/22 1200)  O2 Device: Nasal cannula (01/17/22 1100)  Skin Assessment: Clean, dry, & intact (01/16/22 1953)  Skin Protection for O2 Device: Yes (01/16/22 0745)  Orientation: Bilateral (01/16/22 0745)  Location: Cheek (01/15/22 1900)  Interventions: Mouth Care (01/16/22 0745)  O2 Flow Rate (L/min): 4 l/min (01/17/22 1100)  FIO2 (%): 30 % (01/17/22 0823)    Estimated body mass index is 40.24 kg/m² as calculated from the following:    Height as of this encounter: 5' 2\" (1.575 m). Weight as of this encounter: 99.8 kg (220 lb). Intake/Output Summary (Last 24 hours) at 1/17/2022 1255  Last data filed at 1/17/2022 0451  Gross per 24 hour   Intake 2897.32 ml   Output 1125 ml   Net 1772.32 ml         Physical Exam:     Blood pressure (!) 120/57, pulse 98, temperature 98.1 °F (36.7 °C), resp. rate 20, height 5' 2\" (1.575 m), weight 99.8 kg (220 lb), SpO2 95 %. General:    elderly, opening eyes but altered  CV:   RRR. No m/r/g. No jugular venous distension. No edema   Lungs:    Coarse   Abdomen: Bowel sounds present. Soft, nontender, nondistended. Extremities: No cyanosis or clubbing. No edema  Skin:     No rashes and normal coloration. Warm and dry. Neuro:  grossly intact.     Psych:  Disoriented    I have reviewed ordered lab tests and independently visualized imaging below:    Recent Labs:  Recent Results (from the past 48 hour(s))   GLUCOSE, POC Collection Time: 01/15/22  1:38 PM   Result Value Ref Range    Glucose (POC) 216 (H) 65 - 100 mg/dL    Performed by Filipe [facility]. PPD TEST IN 72 HRS    Collection Time: 01/15/22  2:42 PM   Result Value Ref Range    PPD Negative Negative    mm Induration 0 0 - 5 mm   GLUCOSE, POC    Collection Time: 01/15/22  2:42 PM   Result Value Ref Range    Glucose (POC) 185 (H) 65 - 100 mg/dL    Performed by Nicho    GLUCOSE, POC    Collection Time: 01/15/22  4:14 PM   Result Value Ref Range    Glucose (POC) 176 (H) 65 - 100 mg/dL    Performed by Nicho    SODIUM    Collection Time: 01/15/22  4:16 PM   Result Value Ref Range    Sodium 148 (H) 136 - 145 mmol/L   POTASSIUM    Collection Time: 01/15/22  4:16 PM   Result Value Ref Range    Potassium 3.1 (L) 3.5 - 5.1 mmol/L   GLUCOSE, POC    Collection Time: 01/15/22  6:07 PM   Result Value Ref Range    Glucose (POC) 153 (H) 65 - 100 mg/dL    Performed by Nicho    SODIUM    Collection Time: 01/15/22 10:10 PM   Result Value Ref Range    Sodium 148 (H) 136 - 145 mmol/L   GLUCOSE, POC    Collection Time: 01/15/22 11:14 PM   Result Value Ref Range    Glucose (POC) 130 (H) 65 - 100 mg/dL    Performed by Emily    CBC W/O DIFF    Collection Time: 01/16/22  3:34 AM   Result Value Ref Range    WBC 14.4 (H) 4.3 - 11.1 K/uL    RBC 4.93 4.05 - 5.2 M/uL    HGB 13.3 11.7 - 15.4 g/dL    HCT 42.2 35.8 - 46.3 %    MCV 85.6 79.6 - 97.8 FL    MCH 27.0 26.1 - 32.9 PG    MCHC 31.5 31.4 - 35.0 g/dL    RDW 15.1 (H) 11.9 - 14.6 %    PLATELET 754 579 - 660 K/uL    MPV 8.4 (L) 9.4 - 12.3 FL    ABSOLUTE NRBC 0.00 0.0 - 0.2 K/uL   SODIUM    Collection Time: 01/16/22  3:34 AM   Result Value Ref Range    Sodium 148 (H) 136 - 196 mmol/L   METABOLIC PANEL, BASIC    Collection Time: 01/16/22  3:34 AM   Result Value Ref Range    Sodium 148 (H) 136 - 145 mmol/L    Potassium 3.5 3.5 - 5.1 mmol/L    Chloride 111 (H) 98 - 107 mmol/L    CO2 19 (L) 21 - 32 mmol/L    Anion gap 18 (H) 7 - 16 mmol/L    Glucose 167 (H) 65 - 100 mg/dL    BUN 14 8 - 23 MG/DL    Creatinine 0.57 (L) 0.6 - 1.0 MG/DL    GFR est AA >60 >60 ml/min/1.73m2    GFR est non-AA >60 >60 ml/min/1.73m2    Calcium 9.2 8.3 - 10.4 MG/DL   GLUCOSE, POC    Collection Time: 01/16/22  6:28 AM   Result Value Ref Range    Glucose (POC) 164 (H) 65 - 100 mg/dL    Performed by Alicia Clemens    SARS-COV-2    Collection Time: 01/16/22  8:08 AM   Result Value Ref Range    SARS-CoV-2 Please find results under separate order     SARS-COV-2, PCR    Collection Time: 01/16/22  8:08 AM    Specimen: Nasopharyngeal   Result Value Ref Range    Specimen source Nasopharyngeal      SARS-CoV-2 Detected (A) NOTD     BLOOD GAS, ARTERIAL POC    Collection Time: 01/16/22  8:34 AM   Result Value Ref Range    Device: NASAL CANNULA      pH (POC) 7.45 7.35 - 7.45      pCO2 (POC) 17.4 (LL) 35 - 45 MMHG    pO2 (POC) 83 75 - 100 MMHG    HCO3 (POC) 12.2 (L) 22 - 26 MMOL/L    sO2 (POC) 97.1 95 - 98 %    Base deficit (POC) 8.8 mmol/L    Allens test (POC) Positive      Site RIGHT RADIAL      Specimen type (POC) ARTERIAL      Performed by Cliff     Critical value read back [email or web address]     FIO2, L/min 3     SODIUM    Collection Time: 01/16/22 10:34 AM   Result Value Ref Range    Sodium 149 (H) 136 - 145 mmol/L   GLUCOSE, POC    Collection Time: 01/16/22 12:50 PM   Result Value Ref Range    Glucose (POC) 215 (H) 65 - 100 mg/dL    Performed by Nicho    GLUCOSE, POC    Collection Time: 01/16/22  1:48 PM   Result Value Ref Range    Glucose (POC) 253 (H) 65 - 100 mg/dL    Performed by Nicol Peterson    Collection Time: 01/16/22  1:49 PM   Result Value Ref Range    Glucose 253 mg/dL    Insulin order 3.9 units/hour    Insulin adminstered 3.9 units/hour    Multiplier 0.020     Low target 150 mg/dL    High target 250 mg/dL    D50 order 0.0 ml    D50 administered 0.00 ml    Minutes until next BG 60 min    Order initials CM     Administered initials CM     GLSCOM Comments     METABOLIC PANEL, BASIC    Collection Time: 01/16/22  2:07 PM   Result Value Ref Range    Sodium 147 (H) 136 - 145 mmol/L    Potassium 2.9 (L) 3.5 - 5.1 mmol/L    Chloride 112 (H) 98 - 107 mmol/L    CO2 13 (L) 21 - 32 mmol/L    Anion gap 22 (H) 7 - 16 mmol/L    Glucose 280 (H) 65 - 100 mg/dL    BUN 24 (H) 8 - 23 MG/DL    Creatinine 0.68 0.6 - 1.0 MG/DL    GFR est AA >60 >60 ml/min/1.73m2    GFR est non-AA >60 >60 ml/min/1.73m2    Calcium 8.9 8.3 - 10.4 MG/DL   MAGNESIUM    Collection Time: 01/16/22  2:07 PM   Result Value Ref Range    Magnesium 2.0 1.8 - 2.4 mg/dL   PHOSPHORUS    Collection Time: 01/16/22  2:07 PM   Result Value Ref Range    Phosphorus 4.0 (H) 2.3 - 3.7 MG/DL   GLUCOSE, POC    Collection Time: 01/16/22  3:05 PM   Result Value Ref Range    Glucose (POC) 261 (H) 65 - 100 mg/dL    Performed by Estrela [employer]    GLUCOSTABILIZER    Collection Time: 01/16/22  3:06 PM   Result Value Ref Range    Glucose 261 mg/dL    Insulin order 6.0 units/hour    Insulin adminstered 6.0 units/hour    Multiplier 0.030     Low target 150 mg/dL    High target 250 mg/dL    D50 order 0.0 ml    D50 administered 0.00 ml    Minutes until next BG 60 min    Order initials CM     Administered initials CM     GLSCOM Comments     GLUCOSE, POC    Collection Time: 01/16/22  4:10 PM   Result Value Ref Range    Glucose (POC) 223 (H) 65 - 100 mg/dL    Performed by SosseerosemarieLitespriteRN    GLUCOSTABILIZER    Collection Time: 01/16/22  4:11 PM   Result Value Ref Range    Glucose 223 mg/dL    Insulin order 4.9 units/hour    Insulin adminstered 4.9 units/hour    Multiplier 0.030     Low target 150 mg/dL    High target 250 mg/dL    D50 order 0.0 ml    D50 administered 0.00 ml    Minutes until next BG 60 min    Order initials CM     Administered initials CM     GLSCOM Comments     METABOLIC PANEL, BASIC    Collection Time: 01/16/22  4:37 PM   Result Value Ref Range    Sodium 150 (H) 136 - 145 mmol/L    Potassium 2.9 (L) 3.5 - 5.1 mmol/L    Chloride 115 (H) 98 - 107 mmol/L    CO2 15 (L) 21 - 32 mmol/L    Anion gap 20 (H) 7 - 16 mmol/L    Glucose 196 (H) 65 - 100 mg/dL    BUN 22 8 - 23 MG/DL    Creatinine 0.84 0.6 - 1.0 MG/DL    GFR est AA >60 >60 ml/min/1.73m2    GFR est non-AA >60 >60 ml/min/1.73m2    Calcium 9.1 8.3 - 10.4 MG/DL   MAGNESIUM    Collection Time: 01/16/22  4:37 PM   Result Value Ref Range    Magnesium 2.0 1.8 - 2.4 mg/dL   GLUCOSE, POC    Collection Time: 01/16/22  5:19 PM   Result Value Ref Range    Glucose (POC) 212 (H) 65 - 100 mg/dL    Performed by Nicho    GLUCOSTABILIZER    Collection Time: 01/16/22  5:20 PM   Result Value Ref Range    Glucose 212 mg/dL    Insulin order 4.6 units/hour    Insulin adminstered 4.6 units/hour    Multiplier 0.030     Low target 150 mg/dL    High target 250 mg/dL    D50 order 0.0 ml    D50 administered 0.00 ml    Minutes until next BG 60 min    Order initials CM     Administered initials CM     GLSCOM Comments     METABOLIC PANEL, BASIC    Collection Time: 01/16/22  6:15 PM   Result Value Ref Range    Sodium 151 (H) 136 - 145 mmol/L    Potassium 2.9 (L) 3.5 - 5.1 mmol/L    Chloride 115 (H) 98 - 107 mmol/L    CO2 21 21 - 32 mmol/L    Anion gap 15 7 - 16 mmol/L    Glucose 149 (H) 65 - 100 mg/dL    BUN 23 8 - 23 MG/DL    Creatinine 0.72 0.6 - 1.0 MG/DL    GFR est AA >60 >60 ml/min/1.73m2    GFR est non-AA >60 >60 ml/min/1.73m2    Calcium 8.9 8.3 - 10.4 MG/DL   MAGNESIUM    Collection Time: 01/16/22  6:15 PM   Result Value Ref Range    Magnesium 1.9 1.8 - 2.4 mg/dL   GLUCOSE, POC    Collection Time: 01/16/22  6:26 PM   Result Value Ref Range    Glucose (POC) 137 (H) 65 - 100 mg/dL    Performed by Nicho    GLUCOSTABILIZER    Collection Time: 01/16/22  6:28 PM   Result Value Ref Range    Glucose 137 mg/dL    Insulin order 1.5 units/hour    Insulin adminstered 1.5 units/hour    Multiplier 0.020     Low target 150 mg/dL    High target 250 mg/dL    D50 order 0.0 ml    D50 administered 0.00 ml    Minutes until next BG 60 min    Order initials [address]     Administered initials [address]     GLSCOM Comments     GLUCOSE, POC    Collection Time: 01/16/22  7:35 PM   Result Value Ref Range    Glucose (POC) 181 (H) 65 - 100 mg/dL    Performed by Erby Reading    Collection Time: 01/16/22  7:36 PM   Result Value Ref Range    Glucose 181 mg/dL    Insulin order 2.4 units/hour    Insulin adminstered 2.4 units/hour    Multiplier 0.020     Low target 150 mg/dL    High target 250 mg/dL    D50 order 0.0 ml    D50 administered 0.00 ml    Minutes until next BG 60 min    Order initials HEATHER     Administered initials HEATHER     GLSCOM Comments     GLUCOSE, POC    Collection Time: 01/16/22  8:36 PM   Result Value Ref Range    Glucose (POC) 147 (H) 65 - 100 mg/dL    Performed by Erby Reading    Collection Time: 01/16/22  8:37 PM   Result Value Ref Range    Glucose 147 mg/dL    Insulin order 0.9 units/hour    Insulin adminstered 0.9 units/hour    Multiplier 0.010     Low target 150 mg/dL    High target 250 mg/dL    D50 order 0.0 ml    D50 administered 0.00 ml    Minutes until next BG 60 min    Order initials HEATHER     Administered initials HEATHER     GLSCOM Comments     GLUCOSE, POC    Collection Time: 01/16/22  9:34 PM   Result Value Ref Range    Glucose (POC) 161 (H) 65 - 100 mg/dL    Performed by Erby Reading    Collection Time: 01/16/22  9:35 PM   Result Value Ref Range    Glucose 161 mg/dL    Insulin order 1.0 units/hour    Insulin adminstered 1.0 units/hour    Multiplier 0.010     Low target 150 mg/dL    High target 250 mg/dL    D50 order 0.0 ml    D50 administered 0.00 ml    Minutes until next BG 60 min    Order initials HEATHER     Administered initials HEATHER     GLSCOM Comments     GLUCOSE, POC    Collection Time: 01/16/22 10:27 PM   Result Value Ref Range    Glucose (POC) 179 (H) 65 - 100 mg/dL    Performed by Sabrina Hudson    Collection Time: 01/16/22 10:28 PM   Result Value Ref Range    Glucose 179 mg/dL    Insulin order 1.2 units/hour    Insulin adminstered 1.2 units/hour    Multiplier 0.010     Low target 150 mg/dL    High target 250 mg/dL    D50 order 0.0 ml    D50 administered 0.00 ml    Minutes until next BG 60 min    Order initials HEATHER     Administered initials HEATHER     GLSCOM Comments     GLUCOSE, POC    Collection Time: 01/16/22 11:30 PM   Result Value Ref Range    Glucose (POC) 170 (H) 65 - 100 mg/dL    Performed by Sabrina Hudson    Collection Time: 01/16/22 11:31 PM   Result Value Ref Range    Glucose 170 mg/dL    Insulin order 1.1 units/hour    Insulin adminstered 1.1 units/hour    Multiplier 0.010     Low target 150 mg/dL    High target 250 mg/dL    D50 order 0.0 ml    D50 administered 0.00 ml    Minutes until next BG 60 min    Order initials HEATHER     Administered initials HEATHER     GLSCOM Comments     GLUCOSE, POC    Collection Time: 01/17/22 12:36 AM   Result Value Ref Range    Glucose (POC) 178 (H) 65 - 100 mg/dL    Performed by Sabrina Hudson    Collection Time: 01/17/22 12:37 AM   Result Value Ref Range    Glucose 178 mg/dL    Insulin order 1.2 units/hour    Insulin adminstered 1.2 units/hour    Multiplier 0.010     Low target 150 mg/dL    High target 250 mg/dL    D50 order 0.0 ml    D50 administered 0.00 ml    Minutes until next BG 60 min    Order initials HEATHER     Administered initials HEATHER     GLSCOM Comments     METABOLIC PANEL, BASIC    Collection Time: 01/17/22 12:41 AM   Result Value Ref Range    Sodium 148 (H) 136 - 145 mmol/L    Potassium 3.4 (L) 3.5 - 5.1 mmol/L    Chloride 112 (H) 98 - 107 mmol/L    CO2 27 21 - 32 mmol/L    Anion gap 9 7 - 16 mmol/L    Glucose 199 (H) 65 - 100 mg/dL    BUN 21 8 - 23 MG/DL    Creatinine 0.65 0.6 - 1.0 MG/DL    GFR est AA >60 >60 ml/min/1.73m2    GFR est non-AA >60 >60 ml/min/1.73m2    Calcium 8.7 8.3 - 10.4 MG/DL   MAGNESIUM    Collection Time: 01/17/22 12:41 AM   Result Value Ref Range    Magnesium 2.9 (H) 1.8 - 2.4 mg/dL   GLUCOSE, POC    Collection Time: 01/17/22  1:39 AM   Result Value Ref Range    Glucose (POC) 182 (H) 65 - 100 mg/dL    Performed by Joe Erickson    Collection Time: 01/17/22  1:45 AM   Result Value Ref Range    Glucose 182 mg/dL    Insulin order 1.2 units/hour    Insulin adminstered 1.2 units/hour    Multiplier 0.010     Low target 150 mg/dL    High target 250 mg/dL    D50 order 0.0 ml    D50 administered 0.00 ml    Minutes until next BG 60 min    Order initials HEATHER     Administered initials HEATHER     GLSCOM Comments     GLUCOSE, POC    Collection Time: 01/17/22  2:49 AM   Result Value Ref Range    Glucose (POC) 196 (H) 65 - 100 mg/dL    Performed by Joe Erickson    Collection Time: 01/17/22  2:50 AM   Result Value Ref Range    Glucose 196 mg/dL    Insulin order 1.4 units/hour    Insulin adminstered 1.4 units/hour    Multiplier 0.010     Low target 150 mg/dL    High target 250 mg/dL    D50 order 0.0 ml    D50 administered 0.00 ml    Minutes until next BG 60 min    Order initials HEATHER     Administered initials HEATHER     GLSCOM Comments     GLUCOSE, POC    Collection Time: 01/17/22  4:00 AM   Result Value Ref Range    Glucose (POC) 188 (H) 65 - 100 mg/dL    Performed by Joe Erickson    Collection Time: 01/17/22  4:00 AM   Result Value Ref Range    Glucose 188 mg/dL    Insulin order 1.3 units/hour    Insulin adminstered 1.3 units/hour    Multiplier 0.010     Low target 150 mg/dL    High target 250 mg/dL    D50 order 0.0 ml    D50 administered 0.00 ml    Minutes until next BG 60 min    Order initials HEATHER     Administered initials HEATHER     GLSCOM Comments     CBC W/O DIFF    Collection Time: 01/17/22  4:52 AM   Result Value Ref Range    WBC 12.2 (H) 4.3 - 11.1 K/uL    RBC 4.58 4.05 - 5.2 M/uL HGB 12.5 11.7 - 15.4 g/dL    HCT 39.3 35.8 - 46.3 %    MCV 85.8 79.6 - 97.8 FL    MCH 27.3 26.1 - 32.9 PG    MCHC 31.8 31.4 - 35.0 g/dL    RDW 15.0 (H) 11.9 - 14.6 %    PLATELET 100 765 - 218 K/uL    MPV 8.5 (L) 9.4 - 12.3 FL    ABSOLUTE NRBC 0.00 0.0 - 0.2 K/uL   METABOLIC PANEL, BASIC    Collection Time: 01/17/22  4:52 AM   Result Value Ref Range    Sodium 148 (H) 136 - 145 mmol/L    Potassium 3.7 3.5 - 5.1 mmol/L    Chloride 113 (H) 98 - 107 mmol/L    CO2 28 21 - 32 mmol/L    Anion gap 7 7 - 16 mmol/L    Glucose 205 (H) 65 - 100 mg/dL    BUN 22 8 - 23 MG/DL    Creatinine 0.63 0.6 - 1.0 MG/DL    GFR est AA >60 >60 ml/min/1.73m2    GFR est non-AA >60 >60 ml/min/1.73m2    Calcium 8.6 8.3 - 10.4 MG/DL   MAGNESIUM    Collection Time: 01/17/22  4:52 AM   Result Value Ref Range    Magnesium 1.7 (L) 1.8 - 2.4 mg/dL   GLUCOSE, POC    Collection Time: 01/17/22  5:01 AM   Result Value Ref Range    Glucose (POC) 194 (H) 65 - 100 mg/dL    Performed by Israel Jefferson    Collection Time: 01/17/22  5:02 AM   Result Value Ref Range    Glucose 194 mg/dL    Insulin order 1.3 units/hour    Insulin adminstered 1.3 units/hour    Multiplier 0.010     Low target 150 mg/dL    High target 250 mg/dL    D50 order 0.0 ml    D50 administered 0.00 ml    Minutes until next BG 60 min    Order initials HEATHER     Administered initials HEATHER     GLSCOM Comments     BLOOD GAS, ARTERIAL POC    Collection Time: 01/17/22  5:34 AM   Result Value Ref Range    Device: BIPAP MASK      FIO2 (POC) 30 %    pH (POC) 7.51 (H) 7.35 - 7.45      pCO2 (POC) 30.8 (L) 35 - 45 MMHG    pO2 (POC) 124 (H) 75 - 100 MMHG    HCO3 (POC) 24.9 22 - 26 MMOL/L    sO2 (POC) 99.2 (H) 95 - 98 %    Base excess (POC) 2.6 mmol/L    PEEP/CPAP (POC) 8 cmH2O    PIP (POC) 14      Allens test (POC) Positive      Inspiratory Time 0.9 sec    Total resp.  rate 19      Site RIGHT RADIAL      Specimen type (POC) ARTERIAL      Performed by [facility]     Respiratory comment: 1414/8    GLUCOSE, POC    Collection Time: 01/17/22  6:12 AM   Result Value Ref Range    Glucose (POC) 195 (H) 65 - 100 mg/dL    Performed by Cornel Orr    Collection Time: 01/17/22  6:14 AM   Result Value Ref Range    Glucose 195 mg/dL    Insulin order 1.4 units/hour    Insulin adminstered 1.4 units/hour    Multiplier 0.010     Low target 150 mg/dL    High target 250 mg/dL    D50 order 0.0 ml    D50 administered 0.00 ml    Minutes until next BG 60 min    Order initials HEATHER     Administered initials HEATHER     GLSCOM Comments     GLUCOSE, POC    Collection Time: 01/17/22  7:27 AM   Result Value Ref Range    Glucose (POC) 187 (H) 65 - 100 mg/dL    Performed by Azalia Covarrubias    Collection Time: 01/17/22  7:27 AM   Result Value Ref Range    Glucose 187 mg/dL    Insulin order 1.3 units/hour    Insulin adminstered 1.3 units/hour    Multiplier 0.010     Low target 150 mg/dL    High target 250 mg/dL    D50 order 0.0 ml    D50 administered 0.00 ml    Minutes until next BG 60 min    Order initials TK     Administered initials TK     GLSCOM Comments     METABOLIC PANEL, BASIC    Collection Time: 01/17/22  7:45 AM   Result Value Ref Range    Sodium 149 (H) 136 - 145 mmol/L    Potassium 3.5 3.5 - 5.1 mmol/L    Chloride 112 (H) 98 - 107 mmol/L    CO2 29 21 - 32 mmol/L    Anion gap 8 7 - 16 mmol/L    Glucose 202 (H) 65 - 100 mg/dL    BUN 21 8 - 23 MG/DL    Creatinine 0.59 (L) 0.6 - 1.0 MG/DL    GFR est AA >60 >60 ml/min/1.73m2    GFR est non-AA >60 >60 ml/min/1.73m2    Calcium 8.5 8.3 - 10.4 MG/DL   MAGNESIUM    Collection Time: 01/17/22  7:45 AM   Result Value Ref Range    Magnesium 2.8 (H) 1.8 - 2.4 mg/dL   GLUCOSE, POC    Collection Time: 01/17/22  9:09 AM   Result Value Ref Range    Glucose (POC) 170 (H) 65 - 100 mg/dL    Performed by Azalia Covarrubias    Collection Time: 01/17/22  9:09 AM   Result Value Ref Range    Glucose 170 mg/dL    Insulin order 1.1 units/hour    Insulin adminstered 1.1 units/hour    Multiplier 0.010     Low target 150 mg/dL    High target 250 mg/dL    D50 order 0.0 ml    D50 administered 0.00 ml    Minutes until next BG 60 min    Order initials TK     Administered initials TK     GLSCOM Comments     GLUCOSE, POC    Collection Time: 01/17/22 10:04 AM   Result Value Ref Range    Glucose (POC) 180 (H) 65 - 100 mg/dL    Performed by Tessa Collnis    Collection Time: 01/17/22 10:09 AM   Result Value Ref Range    Glucose 180 mg/dL    Insulin order 1.2 units/hour    Insulin adminstered 1.2 units/hour    Multiplier 0.010     Low target 150 mg/dL    High target 250 mg/dL    D50 order 0.0 ml    D50 administered 0.00 ml    Minutes until next BG 60 min    Order initials TK     Administered initials TK     GLSCOM Comments     GLUCOSE, POC    Collection Time: 01/17/22 11:09 AM   Result Value Ref Range    Glucose (POC) 173 (H) 65 - 100 mg/dL    Performed by Tessa Collins    Collection Time: 01/17/22 11:10 AM   Result Value Ref Range    Glucose 173 mg/dL    Insulin order 1.1 units/hour    Insulin adminstered 1.1 units/hour    Multiplier 0.010     Low target 150 mg/dL    High target 250 mg/dL    D50 order 0.0 ml    D50 administered 0.00 ml    Minutes until next BG 60 min    Order initials TK     Administered initials TK     GLSCOM Comments         All Micro Results     Procedure Component Value Units Date/Time    CULTURE, BLOOD [[id number]] Collected: 01/12/22 0229    Order Status: Completed Specimen: Blood Updated: 01/17/22 0801     Special Requests: --        RIGHT  Antecubital       Culture result: NO GROWTH 5 DAYS       CULTURE, BLOOD [[id number]] Collected: 01/12/22 1249    Order Status: Completed Specimen: Blood Updated: 01/17/22 0801     Special Requests: --        RIGHT  FOREARM       Culture result: NO GROWTH 5 DAYS       SARS-COV-2, PCR [[id number]]  (Abnormal) Collected: 01/16/22 5875    Order Status: Completed Specimen: Nasopharyngeal Updated: 01/16/22 1645     Specimen source Nasopharyngeal        SARS-CoV-2 Detected        Comment:      The specimen is POSITIVE for SARS-CoV-2, the novel coronavirus associated with COVID-19. This test has been authorized by the FDA under an Emergency Use Authorization (EUA) for use by authorized laboratories. Fact sheet for Healthcare Providers: SwipeClock.nz  Fact sheet for Patients: https://fda.gov/media/305004/download       Methodology: RT-PCR  RESULTS VERIFIED, PHONED TO AND READ BACK BY  TAMRA Wade RN ON 01/16/22 @1645, ADS         FUNGUS CULTURE AND SMEAR [[id number]] Collected: 01/15/22 1608    Order Status: Canceled Specimen: Other     FUNGUS CULTURE AND SMEAR [[id number]] Collected: 01/15/22 1517    Order Status: Canceled Specimen: Other     CULTURE, URINE [[id number]]  (Abnormal)  (Susceptibility) Collected: 01/12/22 0227    Order Status: Completed Specimen: Urine from Clean catch Updated: 01/15/22 0733     Special Requests: NO SPECIAL REQUESTS        Culture result:       >100,000 COLONIES/mL ESCHERICHIA COLI                  50,000-100,000 COLONIES/mL NORMAL SKIN SNOW ISOLATED          COVID-19 RAPID TEST [[id number]] Collected: 01/12/22 1137    Order Status: Completed Specimen: Nasopharyngeal Updated: 01/12/22 0320     Specimen source NASAL        COVID-19 rapid test Not detected        Comment:      The specimen is NEGATIVE for SARS-CoV-2, the novel coronavirus associated with COVID-19. A negative result does not rule out COVID-19. This test has been authorized by the FDA under an Emergency Use Authorization (EUA) for use by authorized laboratories. Fact sheet for Healthcare Providers: SwipeClock.nz  Fact sheet for Patients: SwipeClock[employer]nz       Methodology: Isothermal Nucleic Acid Amplification               Other Studies:  No results found.     Current Meds:  Current Facility-Administered Medications   Medication Dose Route Frequency    insulin glargine (LANTUS) injection 20 Units  20 Units SubCUTAneous DAILY    insulin lispro (HUMALOG) injection   SubCUTAneous Q4H    alcohol 62% (NOZIN) nasal  1 Ampule  1 Ampule Topical Q12H    dextrose 5% infusion  50 mL/hr IntraVENous CONTINUOUS    sucralfate (CARAFATE) tablet 1 g  1 g Oral AC&HS    cefTRIAXone (ROCEPHIN) 1 g in 0.9% sodium chloride (MBP/ADV) 50 mL MBP  1 g IntraVENous Q24H    NUTRITIONAL SUPPORT ELECTROLYTE PRN ORDERS   Does Not Apply PRN    pantoprazole (PROTONIX) 40 mg in 0.9% sodium chloride 10 mL injection  40 mg IntraVENous BID    albuterol (PROVENTIL VENTOLIN) nebulizer solution 2.5 mg  2.5 mg Nebulization Q4H PRN    nystatin (MYCOSTATIN) 100,000 unit/gram powder   Topical BID    morphine injection 2 mg  2 mg IntraVENous Q4H PRN    sodium chloride (NS) flush 5-40 mL  5-40 mL IntraVENous Q8H    sodium chloride (NS) flush 5-40 mL  5-40 mL IntraVENous PRN    acetaminophen (TYLENOL) tablet 650 mg  650 mg Oral Q6H PRN    Or    acetaminophen (TYLENOL) suppository 650 mg  650 mg Rectal Q6H PRN    polyethylene glycol (MIRALAX) packet 17 g  17 g Oral DAILY PRN    ondansetron (ZOFRAN ODT) tablet 4 mg  4 mg Oral Q8H PRN    Or    ondansetron (ZOFRAN) injection 4 mg  4 mg IntraVENous Q6H PRN    enoxaparin (LOVENOX) injection 40 mg  40 mg SubCUTAneous DAILY    magnesium hydroxide (MILK OF MAGNESIA) 400 mg/5 mL oral suspension 30 mL  30 mL Oral DAILY PRN    alum-mag hydroxide-simeth (MYLANTA) oral suspension 15 mL  15 mL Oral Q6H PRN    amitriptyline (ELAVIL) tablet 25 mg  25 mg Oral QHS PRN    atorvastatin (LIPITOR) tablet 20 mg  20 mg Oral DAILY    aspirin delayed-release tablet 81 mg  81 mg Oral DAILY    budesonide-formoterol (SYMBICORT) 80-4.5 mcg inhaler  2 Puff Inhalation BID RT    sertraline (ZOLOFT) tablet 100 mg  100 mg Oral DAILY    HYDROcodone-acetaminophen (NORCO) 5-325 mg per tablet 1 Tablet  1 Tablet Oral Q8H PRN    docusate sodium (COLACE) capsule 100 mg  100 mg Oral BID       Signed:  Juan Antonio Freitas MD    Part of this note may have been written by using a voice dictation software. The note has been proof read but may still contain some grammatical/other typographical errors.

## 2022-01-17 NOTE — PROGRESS NOTES
Infectious Disease Progress Note    Today's Date: 2022   Admit Date: 2022    Impression:   · E coli UTI (22)  · Acute respiratory failure with hypoxia - COVID positive,  - on 6L via nasal cannula  · Sinusitis - presumed due to COVID  · Encephalopathy in the setting of DKA  · Asthma  · Type II DM; A1c 7.0  · CVA  · Obesity  · Left humeral head and neck fracture   · Stress Gastritis; coffee ground emesis; seen by GI    Plan:   · Treat with ceftriaxone 1gm q24hrs x 5 days as outlined. · I suspect encephalopathy is primarily DKA related. Leora Sen can cause a DKA syndrome with normal sugars, and encephalopathy is commonly associated with this syndrome. · Her sinusitis is most likely viral and due to COVID. She is previously vaccinated and > 7 days from symptom onset. From an ID standpoint, no contraindication to steroids for hypoxia, though it is unclear to what extent she has COVID pneumonitis. · Low suspicion for fungal sinusitis at this time given her A1C. Anti-infectives:   · IV Zosyn (-  · IV Zithromax (-1/15)  · IV Diflucan (1/15-1/15)    Subjective: Interval history: remains on insulin drip; afebrile; Allergies   Allergen Reactions    Other Food Swelling     Shell fish    Contrast Agent [Iodine] Anaphylaxis    Banana Itching and Swelling     Mouth itches and swells        Review of Systems:  Review of systems not obtained due to patient factors.     Objective:     Visit Vitals  /71   Pulse (!) 104   Temp 98.4 °F (36.9 °C)   Resp 25   Ht 5' 2\" (1.575 m)   Wt 99.8 kg (220 lb)   SpO2 100%   BMI 40.24 kg/m²     Temp (24hrs), Av.5 °F (36.9 °C), Min:97.3 °F (36.3 °C), Max:99.4 °F (37.4 °C)       Lines:  Central Venous Catheter:       Physical Exam:    General:  Lethargic, responds to some questions   Eyes:  Sclera anicteric   Mouth/Throat: Mucous membranes dry, dry blood oral cavity, poor dentition   Neck: Supple   Lungs:   Breathing comfortably   CV: Abdomen:   non-distended   Extremities: No cyanosis, bilateral LE edema   Skin: no acute rash or lesions   Lymph nodes:    Musculoskeletal: No swelling or deformity   Lines/Devices:  Intact, no erythema, drainage or tenderness   Psych: Lethargic, not clearly following commands       Data Review:     CBC:  Recent Labs     01/17/22  0452 01/16/22  0334 01/15/22  0331   WBC 12.2* 14.4* 17.0*   HGB 12.5 13.3 12.4   HCT 39.3 42.2 38.4    434 469*       BMP:  Recent Labs     01/17/22  1351 01/17/22  0745 01/17/22  0452   CREA 0.49* 0.59* 0.63   BUN 20 21 22   * 149* 148*   K 3.1* 3.5 3.7   * 112* 113*   CO2 28 29 28   AGAP 7 8 7   * 202* 205*       LFTS:  No results for input(s): TBILI, ALT, AP, TP, ALB in the last 72 hours. No lab exists for component: SGOT    Microbiology:     All Micro Results     Procedure Component Value Units Date/Time    CULTURE, BLOOD [[id number]] Collected: 01/12/22 0229    Order Status: Completed Specimen: Blood Updated: 01/17/22 0801     Special Requests: --        RIGHT  Antecubital       Culture result: NO GROWTH 5 DAYS       CULTURE, BLOOD [[id number]] Collected: 01/12/22 1249    Order Status: Completed Specimen: Blood Updated: 01/17/22 0801     Special Requests: --        RIGHT  FOREARM       Culture result: NO GROWTH 5 DAYS       SARS-COV-2, PCR [[id number]]  (Abnormal) Collected: 01/16/22 0808    Order Status: Completed Specimen: Nasopharyngeal Updated: 01/16/22 1645     Specimen source Nasopharyngeal        SARS-CoV-2 Detected        Comment:      The specimen is POSITIVE for SARS-CoV-2, the novel coronavirus associated with COVID-19. This test has been authorized by the FDA under an Emergency Use Authorization (EUA) for use by authorized laboratories.         Fact sheet for Healthcare Providers: ConventionUpdate.co.nz  Fact sheet for Patients: https://fda.gov/media/054406/download       Methodology: RT-PCR  RESULTS VERIFIED, PHONED TO AND READ BACK BY  Angy Dey RN ON 22 @1645, ADS         FUNGUS CULTURE AND SMEAR [[id number]] Collected: 01/15/22 1608    Order Status: Canceled Specimen: Other     FUNGUS CULTURE AND SMEAR [[id number]] Collected: 01/15/22 1517    Order Status: Canceled Specimen: Other     CULTURE, URINE [[id number]]  (Abnormal)  (Susceptibility) Collected: 227    Order Status: Completed Specimen: Urine from Clean catch Updated: 01/15/22 0733     Special Requests: NO SPECIAL REQUESTS        Culture result:       >100,000 COLONIES/mL ESCHERICHIA COLI                  50,000-100,000 COLONIES/mL NORMAL SKIN SNOW ISOLATED          COVID-19 RAPID TEST [[id number]] Collected: 22 9087    Order Status: Completed Specimen: Nasopharyngeal Updated: 22 032     Specimen source NASAL        COVID-19 rapid test Not detected        Comment:      The specimen is NEGATIVE for SARS-CoV-2, the novel coronavirus associated with COVID-19. A negative result does not rule out COVID-19. This test has been authorized by the FDA under an Emergency Use Authorization (EUA) for use by authorized laboratories. Fact sheet for Healthcare Providers: ConventionUpdate.co.nz  Fact sheet for Patients: ConventionUpdate.co.nz       Methodology: Isothermal Nucleic Acid Amplification               Imagin22 CXR: IMPRESSION  Lungs appear clear.     Signed By: Ludivina Anderson MD     2022

## 2022-01-17 NOTE — PROGRESS NOTES
Chart reviewed as pt tx to Covid positive isolation CCU. Screen completed prior to tx. Currently pt on 4LO2 NC per RT. No gtt currently other than D5W. CM will continue to follow for any assist and d/c needs/POC per MD. LOS 5 days. Pt is current with Tiki Roman per tala Lacey. [phone].

## 2022-01-17 NOTE — PROGRESS NOTES
Bedside and verbal shift change report received from [id number] Filiberto Santamaria (offgoing nurse). Report included the following information SBAR, Kardex, ED Summary, Procedure Summary, Intake/Output, MAR, Med Rec Status, Cardiac Rhythm ST and Alarm Parameters .      Dual skin assessment completed at bedside: L knee abrasion (list pertinent skin assessment findings)    Dual verification of gtts completed (name of gtts verified): insulin

## 2022-01-18 NOTE — CONSULTS
Comprehensive Nutrition Assessment    Type and Reason for Visit: Consult  TPN Management (Hospitalist)    Nutrition Recommendations/Plan:   Parenteral Nutrition:  Total parenteral nutrition  to begin at 1800  Initiate: Dex 5%, 4.25% AA 1.56 L (65ml/hr)   Hold 250 ml 20% lipids d/t no triglyceride lab available at time of TPN ordering   To provide: 530 kcal/d (44% of needs), 66 grams of protein/d (83% of needs), 78 grams of CHO/d and 1560 ml of total volume/d. Lytes/L:   Sodium 30 meq (30 meq NaCl), Potassium 10 meq (10 meq KPO4), 5 meq Mg, 4.5 meq Calcium  Other additives: MTE, MVI MWF, 100mg thiamine (day 1/7 d/t risk of refeeding)  IVF:  Discontinue at 1800  Nutritional Supplement Therapy:   Implement electrolyte replacement per nutrition support protocols  Replacement indicated:  None  Labs:   BMP daily  Mg tomorrow and MWF  Phos tomorrow and MWF    Triglyceride tomorrow  POC Glucoses/SSI Active     Malnutrition Assessment:  Malnutrition Status: At risk for malnutrition (specify) (NPO since 1/14, advanced age, unknown intake PTA)    Nutrition Assessment:   Nutrition History: Unable to assess 2/2 clinical condition. Per EMR review, pt preview pt previously able to tolerate regular diet during admission however NPO since 1/14 d/t SLP evaluation oropharyngeal dysphagia characterized by impaired oral acceptance, prolonged oral holding, and incoordinated swallow which resulted in overt s/sx of airway compromise with thin liquids. Per EMR review, no recent wt loss. Nutrition Background: Pt with PMH significant for  h/o HTN, DM, recent fall while on cruise resulting in humerus fracture, now with another fall at home, found to have pneumonia with hypoxia and UTI. + COVID-19 1/16 during admission. Transferred to ICU 1/16 d/t decompensation and increased O2 demands. Daily Update:  Pt seen through window, on NC. Discussed with RN, remains NPO per SLP d/t dysphagia.  Pt unable to have FT placed at this time d/t O2 status. TPN to begin. Abdominal Status (last documented): Intact,Obese,Semi-soft abdomen with Hypoactive  bowel sounds. Last BM 01/09/22. Pertinent Medications: rocephin, decadron, lantus (20 units), SSI (9 units given 1/17, 3 given thus far today), nystatin, protonix BID, all other meds being held at this time r/t NPO status  Drips: D5 @ 50ml/hr   Pertinent Labs:  Lab Results   Component Value Date/Time    Sodium 143 01/18/2022 10:58 AM    Potassium 4.0 01/18/2022 10:58 AM    Chloride 110 (H) 01/18/2022 10:58 AM    CO2 21 01/18/2022 10:58 AM    Anion gap 12 01/18/2022 10:58 AM    Glucose 199 (H) 01/18/2022 10:58 AM    BUN 18 01/18/2022 10:58 AM    Creatinine 0.38 (L) 01/18/2022 10:58 AM    Calcium 8.8 01/18/2022 10:58 AM    Albumin 2.9 (L) 01/12/2022 02:29 AM    Magnesium 1.7 (L) 01/17/2022 01:51 PM    Phosphorus 2.8 01/17/2022 01:51 PM       Nutrition Related Findings:   NFPE deferred d/t isolation precautions. No visisble wasting noted through window. Pt on NC during assessment, previous need for CPAP/BiPAP. Remains NPO. TPN to start 1/18.       Current Nutrition Therapies:  DIET NPO    Current Intake:   Average Meal Intake: NPO        Anthropometric Measures:  Height: 5' 2\" (157.5 cm)  Current Body Wt: 99.8 kg (220 lb) (1/11), Weight source: Stated  BMI: 40.2, Obese class 3 (BMI 40.0 or greater)     Ideal Body Weight (lbs) (Calculated): 110 lbs (50 kg), 200 %  Usual Body Wt: 95.7 kg (211 lb) (per MD encounter 12/11/2021), Percent weight change: 4.3          Edema: No data recorded   Estimated Daily Nutrient Needs:  Energy (kcal/day): 2543-3784 (Kcal/kg (12-15), Weight Used: Current (99.8kg))  Protein (g/day): 80-99 (.8-1g/kg) Weight Used: (Current (99.8kg))  Fluid (ml/day):   (1 ml/kcal)    Nutrition Diagnosis:   · Inadequate oral intake related to swallowing difficulty,impaired respiratory function as evidenced by NPO or clear liquid status due to medical condition,nutrition support-parenteral nutrition (desatting with sips of liquid, dysphagia)       Nutrition Interventions:   Food and/or Nutrient Delivery: Continue NPO,Start parenteral nutrition     Coordination of Nutrition Care: Continue to monitor while inpatient  Plan of Care discussed with Karen Daugherty RN    Goals: Active Goal: Tolerate PN at goal within 3 days    Nutrition Monitoring and Evaluation:      Food/Nutrient Intake Outcomes: Parenteral nutrition intake/tolerance  Physical Signs/Symptoms Outcomes: Biochemical data,GI status,Chewing or swallowing,Fluid status or edema,Hemodynamic status,Weight    Discharge Planning:     Too soon to determine    Ada Caddy Eveleen Goodpasture) GAEL Rocha, LD  Contact: [phone]      Disaster Mode Active

## 2022-01-18 NOTE — PROGRESS NOTES
LTG: Patient will tolerate least restrictive diet without overt signs or symptoms of airway compromise. STG: Patient will tolerate po trials with SLP only without overt signs or symptoms of airway compromise. STG: Patient will participate in modified barium swallow study as clinically indicated. SPEECH LANGUAGE PATHOLOGY: DYSPHAGIA- Daily Note 1    NAME/AGE/GENDER: Svetlana Ford is a 57 y.o. female  DATE: 1/18/2022  PRIMARY DIAGNOSIS: Pneumonia [J18.9]      ICD-10: Treatment Diagnosis: R13.12 Dysphagia, Oropharyngeal Phase    RECOMMENDATIONS   DIET:    NPO    MEDICATIONS: Non-oral     PRECAUTIONS, MODIFICATIONS, AND STRATEGIES  · Oral care every 3 hours  ·      RECOMMENDATIONS FOR CONTINUED SPEECH THERAPY:   YES: Anticipate need for ongoing speech therapy during this hospitalization. ASSESSMENT   Patient continues to present with s/sx of oropharyngeal dysphagia characterized by overt coughing with swallow of thin liquids by tsp, mildly thick liquids by tsp, and puree. She is not a safe candidate for po intake at this time. Patient indicated use of thickened liquids in the past, but was unable to provide any additional information. Further chart review from outside hospitals revealed prior modified barium swallow study in 2015 without penetration or aspiration. History of multiple EGDs along with gastroparesis noted. Suspect acute encephalopathy is impacting oropharyngeal stage of swallow at this time. Recommended continued NPO with non-oral medication. Given that mental status likely contributing to dysphagia and her mentation is improving will defer modified barium swallow study until later this week to allow time for continued improvements. EDUCATION:  · Recommendations discussed with Patient and RN    REHABILITATION POTENTIAL FOR STATED GOALS: Good    PLAN    FREQUENCY/DURATION:   Continue to follow patient 3 times a week for duration of hospital stay to address above goals. Recommendations for next treatment session: Next treatment session will address diet tolerance and po trials    CONTINUATION OF SKILLED SERVICES/MEDICAL NECESSITY:   Patient is expected to demonstrate progress in  swallow strength, swallow timeliness, swallow function, diet tolerance and swallow safety in order to  improve swallow safety, work toward diet advancement and decrease aspiration risk.  Patient continues to require skilled intervention due to dysphagia. .       SUBJECTIVE   More responsive today. Remains unable to move head into neutral position. She did respond \"yes\" on one instance. All additional questions answered with head nods     Oxygen Device: nasal cannula  Pain: Pain Scale 1: Numeric (0 - 10)  Pain Intensity 1: 0    History of Present Injury/Illness: Ms. Leigha Carr  has a past medical history of Asthma, Diabetes mellitus ([facility] [address] Gastroparesis (2021), History of CVA (cerebrovascular accident), Neuropathy, Obesity, and Sleep apnea. . She also  has a past surgical history that includes hx  section; hx breast biopsy; hx refractive surgery; pr egd deliver thermal energy sphnctr/cardia gerd (2021); hx colonoscopy (2018); and pr egd deliver thermal energy sphnctr/cardia gerd (2021). PRECAUTIONS/ALLERGIES: Other food, Contrast agent [iodine], and Banana     Problem List:  (Impairments causing functional limitations)  1. Oral dysphagia  2. Resumed dysphagia  3. Impaired expressive language abilities. Previous Dysphagia: NONE REPORTED  Diet Prior to Evaluation: NPO    Orientation:  Unable to state    OBJECTIVE   Dysphagia evaluation:   Appropriate acceptance of all po trials. Cough on first ice chip trials as it melted. Improved tolerance with mastication and swallow of second ice chip. Brief oral holding with thin liquids by tsp, and immediate cough response following swallow. ? If impaired timing/coordination impacting tolerance.  No improvement with single sip of mildly thick liquids by tsp as she again presented with immediate cough response. Cough also noted with puree bolus was placed in oral cavity- before swallow occurred. Difficult to discern if cough is related to airway compromise as it occurred prior to swallow. However, additional trials deferred based on her presentation with earlier liquid trials. Tool Used: Dysphagia Outcome and Severity Scale (SORAIDA)    Score Comments   Normal Diet  [] 7 With no strategies or extra time needed   Functional Swallow  [] 6 May have mild oral or pharyngeal delay   Mild Dysphagia  [] 5 Which may require one diet consistency restricted    Mild-Moderate Dysphagia  [] 4 With 1-2 diet consistencies restricted   Moderate Dysphagia  [] 3 With 2 or more diet consistencies restricted   Moderate-Severe Dysphagia  [] 2 With partial PO strategies (trials with ST only)   Severe Dysphagia  [] 1 With inability to tolerate any PO safely      Score:  Initial: 1 Most Recent: x (Date 01/18/22 )   Interpretation of Tool: The Dysphagia Outcome and Severity Scale (SORAIDA) is a simple, easy-to-use, 7-point scale developed to systematically rate the functional severity of dysphagia based on objective assessment and make recommendations for diet level, independence level, and type of nutrition. Current Medications:   No current facility-administered medications on file prior to encounter. Current Outpatient Medications on File Prior to Encounter   Medication Sig Dispense Refill    lisinopriL (PRINIVIL, ZESTRIL) 20 mg tablet Take 20 mg by mouth daily.  montelukast (Singulair) 10 mg tablet Take 10 mg by mouth daily.  aspirin delayed-release 81 mg tablet Take 81 mg by mouth daily.  amitriptyline (ELAVIL) 75 mg tablet Take 25 mg by mouth nightly as needed for Sleep.  sertraline (Zoloft) 100 mg tablet Take 100 mg by mouth daily.  pregabalin (Lyrica) 300 mg capsule Take 300 mg by mouth two (2) times a day.       empagliflozin (JARDIANCE) 10 mg tablet Take 10 mg by mouth daily.  fluticasone propion-salmeteroL (Advair HFA) 115-21 mcg/actuation inhaler Take 2 Puffs by inhalation two (2) times a day.  fluticasone propionate (Flonase Allergy Relief) 50 mcg/actuation nasal spray 2 Sprays by Both Nostrils route daily.  omeprazole (PRILOSEC) 20 mg capsule Take 20 mg by mouth daily.  amLODIPine (Norvasc) 5 mg tablet Take 5 mg by mouth daily.  atorvastatin (Lipitor) 20 mg tablet Take 20 mg by mouth daily.  meclizine (ANTIVERT) 25 mg tablet Take 1 Tab by mouth three (3) times daily as needed for Dizziness or Nausea for up to 20 doses. 20 Tab 1    ondansetron (ZOFRAN ODT) 4 mg disintegrating tablet Take 1 Tab by mouth every eight (8) hours as needed for Nausea.  8 Tab 2        INTERDISCIPLINARY COLLABORATION: RN    After treatment position/precautions:  · Upright in bed  · RN notified    Total Treatment Duration:   Time In: 3915  Time Out: 1010 [facility], MSP, CCC-SLP  Speech Language Pathologist  Acute Rehabilitation Services  Contact: Chriss

## 2022-01-18 NOTE — PROGRESS NOTES
01/18/22 0119   Oxygen Therapy   O2 Sat (%) 98 %   Pulse via Oximetry 112 beats per minute   O2 Device BIPAP   FIO2 (%) 30 %   Respiratory   Respiratory (WDL) X   Respiratory Pattern Regular   Chest/Tracheal Assessment Chest expansion, symmetrical   CPAP/BIPAP   CPAP/BIPAP Start/Stop On   Device Mode BIPAP   $$ Bipap Daily Yes   Mask Type and Size Full face; Medium   Skin Condition intact   PIP Observed 14 cm H20   IPAP (cm H2O) 14 cm H2O   EPAP (cm H2O) 8 cm H2O   Inspiratory Time (sec) 0.9 seconds   Vt Spont (ml) 865 ml   Ve Observed (l/min) 20.7 l/min   Backup Rate 16   Total RR (Spontaneous) 24 breaths per minute   Insp Rise Time (sec) 3   Leak (Estimated) 11 L/min   Pt's Home Machine No   Biomedical Check Performed Yes   Settings Verified Yes   Alarm Settings   High Pressure 35   Low Pressure 5   Apnea 20   Low Ve 3   High Rate 50   Low Rate 8   Pulmonary Toilet   Pulmonary Toilet H. O.B elevated;Cough and deep breath

## 2022-01-18 NOTE — PROGRESS NOTES
Problem: Patient Education: Go to Patient Education Activity  Goal: Patient/Family Education  Outcome: Progressing Towards Goal     Problem: Falls - Risk of  Goal: *Absence of Falls  Description: Document [place] Fall Risk and appropriate interventions in the flowsheet. Outcome: Progressing Towards Goal  Note: Fall Risk Interventions:  Mobility Interventions: Assess mobility with egress test,Bed/chair exit alarm,Communicate number of staff needed for ambulation/transfer    Mentation Interventions: Adequate sleep, hydration, pain control,Bed/chair exit alarm,Toileting rounds    Medication Interventions: Assess postural VS orthostatic hypotension,Bed/chair exit alarm,Evaluate medications/consider consulting pharmacy    Elimination Interventions: Bed/chair exit alarm,Call light in reach,Toileting schedule/hourly rounds    History of Falls Interventions: Bed/chair exit alarm,Evaluate medications/consider consulting pharmacy         Problem: Patient Education: Go to Patient Education Activity  Goal: Patient/Family Education  Outcome: Progressing Towards Goal     Problem: Pressure Injury - Risk of  Goal: *Prevention of pressure injury  Description: Document Jaime Scale and appropriate interventions in the flowsheet. Outcome: Progressing Towards Goal  Note: Pressure Injury Interventions:  Sensory Interventions: Assess changes in LOC,Assess need for specialty bed,Pressure redistribution bed/mattress (bed type),Minimize linen layers,Keep linens dry and wrinkle-free,Check visual cues for pain,Turn and reposition approx.  every two hours (pillows and wedges if needed)    Moisture Interventions: Absorbent underpads,Apply protective barrier, creams and emollients,Check for incontinence Q2 hours and as needed,Internal/External urinary devices,Minimize layers    Activity Interventions: Assess need for specialty bed,Pressure redistribution bed/mattress(bed type)    Mobility Interventions: Assess need for specialty bed,Pressure redistribution bed/mattress (bed type),Float heels,Turn and reposition approx. every two hours(pillow and wedges)    Nutrition Interventions: Document food/fluid/supplement intake    Friction and Shear Interventions: Apply protective barrier, creams and emollients,Lift sheet                Problem: Patient Education: Go to Patient Education Activity  Goal: Patient/Family Education  Outcome: Progressing Towards Goal     Problem: Delirium Treatment  Goal: *Level of consciousness restored to baseline  Outcome: Progressing Towards Goal  Goal: *Level of environmental perceptions restored to baseline  Outcome: Progressing Towards Goal  Goal: *Sensory perception restored to baseline  Outcome: Progressing Towards Goal  Goal: *Emotional stability restored to baseline  Outcome: Progressing Towards Goal  Goal: *Functional assessment restored to baseline  Outcome: Progressing Towards Goal  Goal: *Absence of falls  Outcome: Progressing Towards Goal  Goal: *Will remain free of delirium, CAM Score negative  Outcome: Progressing Towards Goal  Goal: *Cognitive status will be restored to baseline  Outcome: Progressing Towards Goal  Goal: Interventions  Outcome: Progressing Towards Goal     Problem: Patient Education: Go to Patient Education Activity  Goal: Patient/Family Education  Outcome: Progressing Towards Goal     Problem: Diabetes Self-Management  Goal: *Disease process and treatment process  Description: Define diabetes and identify own type of diabetes; list 3 options for treating diabetes. Outcome: Progressing Towards Goal  Goal: *Incorporating nutritional management into lifestyle  Description: Describe effect of type, amount and timing of food on blood glucose; list 3 methods for planning meals. Outcome: Progressing Towards Goal  Goal: *Incorporating physical activity into lifestyle  Description: State effect of exercise on blood glucose levels.   Outcome: Progressing Towards Goal  Goal: *Developing strategies to promote health/change behavior  Description: Define the ABC's of diabetes; identify appropriate screenings, schedule and personal plan for screenings. Outcome: Progressing Towards Goal  Goal: *Using medications safely  Description: State effect of diabetes medications on diabetes; name diabetes medication taking, action and side effects. Outcome: Progressing Towards Goal  Goal: *Monitoring blood glucose, interpreting and using results  Description: Identify recommended blood glucose targets  and personal targets. Outcome: Progressing Towards Goal  Goal: *Prevention, detection, treatment of acute complications  Description: List symptoms of hyper- and hypoglycemia; describe how to treat low blood sugar and actions for lowering  high blood glucose level. Outcome: Progressing Towards Goal  Goal: *Prevention, detection and treatment of chronic complications  Description: Define the natural course of diabetes and describe the relationship of blood glucose levels to long term complications of diabetes.   Outcome: Progressing Towards Goal  Goal: *Developing strategies to address psychosocial issues  Description: Describe feelings about living with diabetes; identify support needed and support network  Outcome: Progressing Towards Goal  Goal: *Insulin pump training  Outcome: Progressing Towards Goal  Goal: *Sick day guidelines  Outcome: Progressing Towards Goal  Goal: *Patient Specific Goal (EDIT GOAL, INSERT TEXT)  Outcome: Progressing Towards Goal     Problem: Patient Education: Go to Patient Education Activity  Goal: Patient/Family Education  Outcome: Progressing Towards Goal     Problem: Airway Clearance - Ineffective  Goal: Achieve or maintain patent airway  Outcome: Progressing Towards Goal     Problem: Gas Exchange - Impaired  Goal: Absence of hypoxia  Outcome: Progressing Towards Goal  Goal: Promote optimal lung function  Outcome: Progressing Towards Goal     Problem: Breathing Pattern - Ineffective  Goal: Ability to achieve and maintain a regular respiratory rate  Outcome: Progressing Towards Goal     Problem:  Body Temperature -  Risk of, Imbalanced  Goal: Ability to maintain a body temperature within defined limits  Outcome: Progressing Towards Goal  Goal: Will regain or maintain usual level of consciousness  Outcome: Progressing Towards Goal  Goal: Complications related to the disease process, condition or treatment will be avoided or minimized  Outcome: Progressing Towards Goal     Problem: Isolation Precautions - Risk of Spread of Infection  Goal: Prevent transmission of infectious organism to others  Outcome: Progressing Towards Goal     Problem: Nutrition Deficits  Goal: Optimize nutrtional status  Outcome: Progressing Towards Goal     Problem: Risk for Fluid Volume Deficit  Goal: Maintain normal heart rhythm  Outcome: Progressing Towards Goal  Goal: Maintain absence of muscle cramping  Outcome: Progressing Towards Goal  Goal: Maintain normal serum potassium, sodium, calcium, phosphorus, and pH  Outcome: Progressing Towards Goal     Problem: Loneliness or Risk for Loneliness  Goal: Demonstrate positive use of time alone when socialization is not possible  Outcome: Progressing Towards Goal     Problem: Fatigue  Goal: Verbalize increase energy and improved vitality  Outcome: Progressing Towards Goal     Problem: Patient Education: Go to Patient Education Activity  Goal: Patient/Family Education  Outcome: Progressing Towards Goal     Problem: Patient Education: Go to Patient Education Activity  Goal: Patient/Family Education  Outcome: Progressing Towards Goal

## 2022-01-18 NOTE — PROGRESS NOTES
TRANSFER - IN REPORT:    Verbal report received from Mariajose(name) on Dori Baird  being received from [facility](unit) for routine progression of care      Report consisted of patients Situation, Background, Assessment and   Recommendations(SBAR). Information from the following report(s) SBAR was reviewed with the receiving nurse. Opportunity for questions and clarification was provided. Assessment completed upon patients arrival to unit and care assumed.  Awaiting arrival,report to [facility] GAVIN

## 2022-01-18 NOTE — PROGRESS NOTES
[facility] Critical Care Note[de-identified] 1/18/2022  Deo Blevins  Admission Date: 1/11/2022     Length of Stay: 6 days    Background: 57 y. o. female presented after a falling right hip. She fell over a week ago and injuring her shoulder. She has been on norco. XR hip without acute process. She has a left humeral fracture that occurred while on [employer]. Not seen by ortho to date until here. Records were evaluated per ortho this admit and recommends CT left shoulder and outpatient followup with Dr. Heriberto Law. She is wearing a sling.  She has underlying asthma, DM2, CVA, obesity, neuropathy, and  JESSICA on CPAP.      She has been hypoxic and CXR shows some atelectasis/infiltrates. + E coli UTI and she is on zosyn. Her BS was > 1000 - suspected DKA. Insulin drip initiated with hydration with improvement. Bicarb used with correction of acidosis. Notable PMH:  has a past medical history of Asthma, Diabetes mellitus ([facility] [address] Gastroparesis (06/28/2021), History of CVA (cerebrovascular accident), Neuropathy, Obesity, and Sleep apnea. 24 Hour events: off insulin and bicarb gtts. On 4L NC. Used CPAP QHS. She was able to give thumbs up for me, but was non-verbal. Later with nurse she was able to count to 5 verbally. ROS: unable to obtain/negative except as listed elsewhere. Lines: (insertion date)   Nair: (1/13)  Central line: (1/13)    Drips: current dose (range)      Pertinent Exam:         Blood pressure (!) 140/73, pulse (!) 103, temperature 98 °F (36.7 °C), resp. rate 20, height 5' 2\" (1.575 m), weight 220 lb (99.8 kg), SpO2 100 %. Intake/Output Summary (Last 24 hours) at 1/18/2022 0950  Last data filed at 1/18/2022 0517  Gross per 24 hour   Intake 1948.12 ml   Output 1425 ml   Net 523.12 ml     Constitutional:  in resp.  distress  EENMT:  Sclera clear, pupils equal, oral mucosa moist  Respiratory:  rhonchi  Cardiovascular:  RRR  Gastrointestinal:  soft with no tenderness; positive bowel sounds present  Musculoskeletal:  warm with no cyanosis, no lower extremity edema  Skin:  no jaundice or ecchymosis  Neurologic:awake but does not follow commands, non verbal  Psychiatric: awake, alert, but not following commands and non-verbal    CXR: 1/16: clear      Recent Labs     01/17/22  1351 01/17/22  0452 01/16/22  0334   WBC  --  12.2* 14.4*   HGB  --  12.5 13.3   HCT  --  39.3 42.2   PLT  --  378 434   PCT <0.05  --   --      Recent Labs     01/17/22  1351 01/17/22  0745 01/17/22  0452 01/16/22  1637 01/16/22  1407   * 149* 148*   < > 147*   K 3.1* 3.5 3.7   < > 2.9*   * 112* 113*   < > 112*   CO2 28 29 28   < > 13*   * 202* 205*   < > 280*   BUN 20 21 22   < > 24*   CREA 0.49* 0.59* 0.63   < > 0.68   MG 1.7* 2.8* 1.7*   < > 2.0   CA 8.6 8.5 8.6   < > 8.9   PHOS 2.8  --   --   --  4.0*    < > = values in this interval not displayed. Recent Labs     01/17/22  1351   CRP 1.4*     Recent Labs     01/18/22  0501 01/18/22  0217 01/17/22  2101   GLUCPOC 145* 148* 163*     ECHO: No results found for this or any previous visit. Results     Procedure Component Value Units Date/Time    SARS-COV-2, PCR [[id number]]  (Abnormal) Collected: 01/16/22 0808    Order Status: Completed Specimen: Nasopharyngeal Updated: 01/16/22 1645     Specimen source Nasopharyngeal        SARS-CoV-2 Detected        Comment:      The specimen is POSITIVE for SARS-CoV-2, the novel coronavirus associated with COVID-19. This test has been authorized by the FDA under an Emergency Use Authorization (EUA) for use by authorized laboratories.         Fact sheet for Healthcare Providers: ConventionUpdate.co.nz  Fact sheet for Patients: https://fda.gov/media/253548/download       Methodology: RT-PCR  RESULTS VERIFIED, PHONED TO AND READ BACK BY  TAMRA Wade, RN ON 01/16/22 @1645, ADS         FUNGUS CULTURE AND SMEAR [[id number]] Collected: 01/15/22 1608    Order Status: Canceled Specimen: Other FUNGUS CULTURE AND SMEAR [[id number]] Collected: 01/15/22 1517    Order Status: Canceled Specimen: Other     CULTURE, BLOOD [[id number]] Collected: 01/12/22 1249    Order Status: Completed Specimen: Blood Updated: 01/17/22 0801     Special Requests: --        RIGHT  FOREARM       Culture result: NO GROWTH 5 DAYS       CULTURE, BLOOD [[id number]] Collected: 01/12/22 0229    Order Status: Completed Specimen: Blood Updated: 01/17/22 0801     Special Requests: --        RIGHT  Antecubital       Culture result: NO GROWTH 5 DAYS       COVID-19 RAPID TEST [[id number]] Collected: 01/12/22 0227    Order Status: Completed Specimen: Nasopharyngeal Updated: 01/12/22 0320     Specimen source NASAL        COVID-19 rapid test Not detected        Comment:      The specimen is NEGATIVE for SARS-CoV-2, the novel coronavirus associated with COVID-19. A negative result does not rule out COVID-19. This test has been authorized by the FDA under an Emergency Use Authorization (EUA) for use by authorized laboratories.         Fact sheet for Healthcare Providers: ConventionUpdate.co.nz  Fact sheet for Patients: ConventionUpdate.co.nz       Methodology: Isothermal Nucleic Acid Amplification         CULTURE, URINE [[id number]]  (Abnormal)  (Susceptibility) Collected: 01/12/22 0227    Order Status: Completed Specimen: Urine from Clean catch Updated: 01/15/22 0792     Special Requests: NO SPECIAL REQUESTS        Culture result:       >100,000 COLONIES/mL ESCHERICHIA COLI                  50,000-100,000 COLONIES/mL NORMAL SKIN SNOW ISOLATED          Susceptibility      Escherichia coli     JAMIL     Ampicillin ($) Resistant     Ampicillin/sulbactam ($) Resistant     Aztreonam ($$$$) Susceptible     Cefazolin ($) Resistant     Cefepime ($$) Susceptible     Ceftriaxone ($) Susceptible     Gentamicin ($) Susceptible     Nitrofurantoin Susceptible     Piperacillin/Tazobac ($) Susceptible     Tobramycin ($) Susceptible     Trimeth-Sulfamethoxa Susceptible                  Linear View                       Inpat Anti-Infectives (From admission, onward)     Start     Ordered Stop    01/15/22 1700  cefTRIAXone (ROCEPHIN) 1 g in 0.9% sodium chloride (MBP/ADV) 50 mL MBP  1 g,   IntraVENous,   EVERY 24 HOURS         01/15/22 1649 01/20/22 1659    01/13/22 1800  nystatin (MYCOSTATIN) 100,000 unit/gram powder  Topical,   2 TIMES DAILY         01/13/22 1203 --              Ventilator Settings:  Ideal body weight: 50.1 kg (110 lb 7.2 oz)   Mode FIO2 Rate Tidal Volume Pressure PEEP      30 %               Peak airway pressure:         Minute ventilation: 20.7 l/min  ABG:  Recent Labs     01/17/22  0534 01/16/22  0834   PHI 7.51* 7.45   PCO2I 30.8* 17.4*   PO2I 124* 83   HCO3I 24.9 12.2*     Assessment and Plan:  (Medical Decision Making)   Impression: 57 y.o. female with recent fall and left humeral fx. Wearing sling and scheduled for outpatient follow up with Dr. Ashley Sifuentes. BS > 1000 so insulin gtt initiated in the ICU. Required transfer to the ICU for tx of DKA/acidosis. Required bipap short term due to mental status. Hospitalist managing DM. CT head on 1/14 negative. + e coli UTI - on Zosyn.      NEURO:  awake but non-verbal , not following commands  Acute metabolic encephalopathy: acidosis, UTI, COVID?, may be related to Jardiance and DKA  CV:   Volume Status:  Looks euvolemic  PULM:   Acute hypoxemic/hypercapneic respiratory failure: acidosis corrected on 4L NC now  JESSICA: CPAP QHS, either home or hospital machine pending O2 requirements  RENAL:  Metabolic acidosis: resolved, off bicarb gtt  Electrolytes: Na 149- on D5W. K+ supplemented, off bicarb gtt. GI:   Nutrition: none at present. Will need to hold po diet until she is more alert  HEME:   Anticoagulation: low dose lovenox  ID:   E coli UTI - on zosyn which organism is sensitive to.  Completed Azithro  ENDO:   DM: doing well on SQ insulin, stop D5 if able to take PO or start TF  Skin: no decub, turns, preventive care  Prophy: lovenox/protonix    Full Code     Will sign off, call with questions. Has transfer orders for floor.      Mitzi Jackson MD

## 2022-01-18 NOTE — PROGRESS NOTES
TRANSFER - OUT REPORT:    Verbal report given to [facility], RN on Amanda Coleman  being transferred to  for routine progression of care       Report consisted of patients Situation, Background, Assessment and   Recommendations(SBAR). Information from the following report(s) SBAR, Kardex, ED Summary, Intake/Output, MAR, Recent Results, Med Rec Status, Cardiac Rhythm Sinus tach and Alarm Parameters  was reviewed with the receiving nurse. Lines:   Quad Lumen Quad Lumen 01/13/22 Left Subclavian (Active)   Central Line Being Utilized Yes 01/18/22 0703   Criteria for Appropriate Use Limited/no vessel suitable for conventional peripheral access 01/18/22 0703   Site Assessment Clean, dry, & intact 01/18/22 0703   Infiltration Assessment 0 01/18/22 0703   Affected Extremity/Extremities Color distal to insertion site pink (or appropriate for race); Pulses palpable;Range of motion performed 01/18/22 0703   Date of Last Dressing Change 01/16/22 01/18/22 0703   Dressing Status Clean, dry, & intact 01/18/22 0703   Dressing Type Disk with Chlorhexadine gluconate (CHG); Tape;Transparent 01/18/22 0703   Action Taken Blood drawn 01/18/22 0703   Proximal Hub Color/Line Status White;Flushed;Patent 01/18/22 0703   Positive Blood Return (Medial Site) Yes 01/18/22 0703   Medial 1 Hub Color/Line Status Gray;Flushed;Patent 01/18/22 0703   Positive Blood Return (Lateral Site) Yes 01/18/22 0703   Medial 2 Hub Color/Line Status Blue;Flushed;Patent 01/18/22 0703   Positive Blood Return (Site #3) Yes 01/18/22 0703   Distal Hub Color/Line Status Brown;Flushed;Patent 01/18/22 0703   Positive Blood Return (Site #4) Yes 01/18/22 0703   Alcohol Cap Used Yes 01/18/22 0306        Opportunity for questions and clarification was provided.       Patient transported with:   O2 @ 4 liters

## 2022-01-18 NOTE — PROGRESS NOTES
Hospitalist Progress Note   Admit Date:  2022 10:04 PM   Name:  Callum Rios   Age:  57 y.o. Sex:  female  :  1960   MRN:  [id number]   Room:  [facility]/    Presenting Complaint: Fall    Reason(s) for Admission: Pneumonia [J18.9]     Hospital Course & Interval History:     Ms. Sophia Hernandez is a 56 yo female with PMH of asthma, DM2, CVA, obesity, neuropathy, JESSICA on CPAP admitted s/p fall with recent left humeral fracture that occurred [place] on [employer]. Ortho consulted and and recommends CT left shoulder and outpatient followup with Dr. Tegan Feldman. Patient also found hypoxic and met sepsis criteria due to fever / leukocytosis. Sepsis work-up showed LLL pneumonia and UTI. COVID negative. She was started on empiric antibiotic. Patient was found to be in diabetic ketoacidosis on  and started on glucose stabilizer with improvement. Patient placed back on insulin gtt. and bicarb for recurrent DKA on . Patient initially required BiPAP and then transitioned to nasal cannula. Patient started spiking high-grade fevers on .  notified family member positive for COVID. Patient was rechecked for COVID and came positive. CT head was done for concern for confusion and showed no strokes but there is a concern for a fungal right maxillary sinus infection. She was started on fluconazole. Infectious disease consulted and recommend no invasion of bone on imaging and no finding on skin, eye or palate to suggest Mucor. She could possibly have an Aspergillus chronic sinusitis, and recommend to discontinue fluconazole and continue to treat E. coli UTI. Fungitell and Aspergillus antigen from serum ordered. Patient had an episode of coffee-ground emesis on 1/15. GI consulted and recommend continue PPI. Subjective (22): Patient seen and evaluated. More alert today. Not answering questions but following commands. On 4 L nasal cannula.   Limited review of system due to patient current condition. Patient has failed swallow test yesterday. Currently n.p.o. Assessment & Plan:     Diabetic ketoacidosis/diabetes mellitus:  A1c 7 on 1/14  Found in diabetic ketoacidosis on 1/13, started on glucose stabilizer, bicarb gtt. and insulin gtt. with correction. Placed back on insulin GTT and bicarb for recurrent DKA on 1/16  Anion gap closed and acidosis resolved on 1/17 and patient transitioned to subcu insulin regimen   1/18: Blood glucose  in 160s. Continue Lantus 20 units daily and sliding scale    Acute respiratory failure with hypoxia  Sepsis:  COVID-19 pneumonia:  COVID-positive on 1/16. Off BiPAP now  On 4 L nasal cannula, wean as tolerated  Continue CPAP at night  Cultures till date negative  Check inflammatory markers  Continue Decadron 6 mg daily for 10 days  Supportive care  1/18: Continue supportive care and Decadron. On 4 L nasal cannula, wean as tolerated. Continue CPAP at night    Encephalopathy:  Metabolic versus Covid induce  Metabolic acidosis resolved  Continue to treat COVID as above  Frequent reorientation  Delirium precautions  Avoid sedatives  1/18: Mentation improving. PT/OT    Right maxillary sinus fungal infection:  CT head was done for concern for confusion and showed no strokes but there is a concern for a fungal right maxillary sinus infection. She was started on fluconazole. Infectious disease consulted and recommend no invasion of bone on imaging and no finding on skin, eye or palate to suggest Mucor. She could possibly have an Aspergillus chronic sinusitis, and recommend to discontinue fluconazole and continue to treat E. coli UTI. Fungitell and Aspergillus antigen from serum ordered. UTI . Urine culture positive for E. Coli  1/18: continue ceftriaxone, EOT 1/19    Oropharyngeal dysphagia:  1/18: Patient has failed speech eval, currently n.p.o. Will start patient on TPN. Nutrition consulted. Speech following.      Closed fracture of surgical neck of humerus  Outpatient ortho  Supportive care   PT,OT    Prior CVA:  Continued asa, lipitor    Coffee-ground emesis  GI recommend continue Protonix        Dispo/Discharge Planning:  TBD    Diet: N.p.o. for now given altered mental status  DVT PPx: lovenox  Code status: Full Code    Hospital Problems as of 1/18/2022 Never Reviewed          Codes Class Noted - Resolved POA    Hematemesis with nausea ICD-10-CM: K92.0  ICD-9-CM: 578.0, 787.02  1/15/2022 - Present No        Hypernatremia ICD-10-CM: E87.0  ICD-9-CM: 276.0  1/15/2022 - Present No        Encephalopathy ICD-10-CM: G93.40  ICD-9-CM: 348.30  1/15/2022 - Present Unknown        Metabolic acidosis XRX-40-HM: E87.2  ICD-9-CM: 276.2  1/13/2022 - Present No        Pneumonia ICD-10-CM: J18.9  ICD-9-CM: 844  1/12/2022 - Present Yes        UTI (urinary tract infection) ICD-10-CM: N39.0  ICD-9-CM: 599.0  1/12/2022 - Present Yes        Hypoxia ICD-10-CM: R09.02  ICD-9-CM: 799.02  1/12/2022 - Present Yes        Obesity (Chronic) ICD-10-CM: E66.9  ICD-9-CM: 278.00  1/12/2022 - Present Yes        Closed fracture of surgical neck of humerus ICD-10-CM: S42.213A  ICD-9-CM: 812.01  1/12/2022 - Present Yes        Acute respiratory failure with hypoxia [facility]) ICD-10-CM: J96.01  ICD-9-CM: 518.81  1/12/2022 - Present Yes        * (Principal) Sepsis ([address] ICD-10-CM: A41.9  ICD-9-CM: 038.9, 995.91  1/12/2022 - Present Yes              Objective:     Patient Vitals for the past 24 hrs:   Temp Pulse Resp BP SpO2   01/18/22 1425 99.8 °F (37.7 °C) (!) 108 20 (!) 149/82 98 %   01/18/22 1300  (!) 106 29 (!) 161/70 98 %   01/18/22 1200  (!) 104 25 (!) 162/76 96 %   01/18/22 1100 98.2 °F (36.8 °C) (!) 108 (!) 46 (!) 154/78 99 %   01/18/22 1000  (!) 107 25 (!) 149/76 99 %   01/18/22 0900  98 29 (!) 153/79 100 %   01/18/22 0841     100 %   01/18/22 0800  (!) 104 16 (!) 148/85 100 %   01/18/22 0703 98 °F (36.7 °C) (!) 103 20 (!) 140/73 98 %   01/18/22 0603  (!) 102 17 (!) 144/73 99 %   01/18/22 0502  (!) 101 17 (!) 122/57 99 %   01/18/22 0402  (!) 102 21 130/61 99 %   01/18/22 0306 98.6 °F (37 °C) (!) 104 20 135/75 96 %   01/18/22 0305  (!) 104 16  99 %   01/18/22 0304  (!) 105 23  98 %   01/18/22 0203  (!) 109 18 (!) 152/68 97 %   01/18/22 0119     98 %   01/18/22 0104  (!) 112 (!) 34 (!) 153/70 99 %   01/18/22 0002 98.5 °F (36.9 °C) (!) 110 20 (!) 140/68 100 %   01/17/22 2302  (!) 110 27 (!) 142/68 99 %   01/17/22 2202  (!) 111 18 (!) 144/72 100 %   01/17/22 2102  (!) 101 (!) 33 (!) 145/66 100 %   01/17/22 1902 98.8 °F (37.1 °C) (!) 113 25 (!) 148/73 100 %   01/17/22 1802  (!) 110 22 (!) 147/62 100 %   01/17/22 1700  (!) 106 25 133/60 99 %   01/17/22 1600  (!) 106 25 139/77 98 %   01/17/22 1500 98.4 °F (36.9 °C) (!) 104 25 134/71 100 %     Oxygen Therapy  O2 Sat (%): 98 % (01/18/22 1425)  Pulse via Oximetry: 106 beats per minute (01/18/22 0841)  O2 Device: Nasal cannula (01/18/22 0841)  Skin Assessment: Clean, dry, & intact (01/18/22 0703)  Skin Protection for O2 Device: No (01/18/22 0703)  Orientation: Bilateral (01/16/22 0745)  Location: Cheek (01/15/22 1900)  Interventions: Mouth Care (01/18/22 0703)  O2 Flow Rate (L/min): 4 l/min (01/18/22 0841)  FIO2 (%): 30 % (01/18/22 0119)    Estimated body mass index is 40.24 kg/m² as calculated from the following:    Height as of this encounter: 5' 2\" (1.575 m). Weight as of this encounter: 99.8 kg (220 lb). Intake/Output Summary (Last 24 hours) at 1/18/2022 1437  Last data filed at 1/18/2022 0517  Gross per 24 hour   Intake 1948.12 ml   Output 1425 ml   Net 523.12 ml         Physical Exam:     Blood pressure (!) 149/82, pulse (!) 108, temperature 99.8 °F (37.7 °C), resp. rate 20, height 5' 2\" (1.575 m), weight 99.8 kg (220 lb), SpO2 98 %. General:    elderly, following some commands but not answering any question. CV:   RRR. No m/r/g. No jugular venous distension. No edema   Lungs:    Coarse   Abdomen:    Bowel sounds present. Soft, nontender, nondistended. Extremities: No cyanosis or clubbing. No edema  Skin:     No rashes and normal coloration. Warm and dry. Neuro:  grossly intact.     Psych:  Altered    I have reviewed ordered lab tests and independently visualized imaging below:    Recent Labs:  Recent Results (from the past 48 hour(s))   GLUCOSE, POC    Collection Time: 01/16/22  3:05 PM   Result Value Ref Range    Glucose (POC) 261 (H) 65 - 100 mg/dL    Performed by [facility]    GLUCOSTABILIZER    Collection Time: 01/16/22  3:06 PM   Result Value Ref Range    Glucose 261 mg/dL    Insulin order 6.0 units/hour    Insulin adminstered 6.0 units/hour    Multiplier 0.030     Low target 150 mg/dL    High target 250 mg/dL    D50 order 0.0 ml    D50 administered 0.00 ml    Minutes until next BG 60 min    Order initials CM     Administered initials CM     GLSCOM Comments     GLUCOSE, POC    Collection Time: 01/16/22  4:10 PM   Result Value Ref Range    Glucose (POC) 223 (H) 65 - 100 mg/dL    Performed by [employer]    GLUCOSTABILIZER    Collection Time: 01/16/22  4:11 PM   Result Value Ref Range    Glucose 223 mg/dL    Insulin order 4.9 units/hour    Insulin adminstered 4.9 units/hour    Multiplier 0.030     Low target 150 mg/dL    High target 250 mg/dL    D50 order 0.0 ml    D50 administered 0.00 ml    Minutes until next BG 60 min    Order initials CM     Administered initials CM     GLSCOM Comments     METABOLIC PANEL, BASIC    Collection Time: 01/16/22  4:37 PM   Result Value Ref Range    Sodium 150 (H) 136 - 145 mmol/L    Potassium 2.9 (L) 3.5 - 5.1 mmol/L    Chloride 115 (H) 98 - 107 mmol/L    CO2 15 (L) 21 - 32 mmol/L    Anion gap 20 (H) 7 - 16 mmol/L    Glucose 196 (H) 65 - 100 mg/dL    BUN 22 8 - 23 MG/DL    Creatinine 0.84 0.6 - 1.0 MG/DL    GFR est AA >60 >60 ml/min/1.73m2    GFR est non-AA >60 >60 ml/min/1.73m2    Calcium 9.1 8.3 - 10.4 MG/DL   MAGNESIUM    Collection Time: 01/16/22  4:37 PM Result Value Ref Range    Magnesium 2.0 1.8 - 2.4 mg/dL   GLUCOSE, POC    Collection Time: 01/16/22  5:19 PM   Result Value Ref Range    Glucose (POC) 212 (H) 65 - 100 mg/dL    Performed by [employer]    GLUCOSTABILIZER    Collection Time: 01/16/22  5:20 PM   Result Value Ref Range    Glucose 212 mg/dL    Insulin order 4.6 units/hour    Insulin adminstered 4.6 units/hour    Multiplier 0.030     Low target 150 mg/dL    High target 250 mg/dL    D50 order 0.0 ml    D50 administered 0.00 ml    Minutes until next BG 60 min    Order initials CM     Administered initials CM     GLSCOM Comments     METABOLIC PANEL, BASIC    Collection Time: 01/16/22  6:15 PM   Result Value Ref Range    Sodium 151 (H) 136 - 145 mmol/L    Potassium 2.9 (L) 3.5 - 5.1 mmol/L    Chloride 115 (H) 98 - 107 mmol/L    CO2 21 21 - 32 mmol/L    Anion gap 15 7 - 16 mmol/L    Glucose 149 (H) 65 - 100 mg/dL    BUN 23 8 - 23 MG/DL    Creatinine 0.72 0.6 - 1.0 MG/DL    GFR est AA >60 >60 ml/min/1.73m2    GFR est non-AA >60 >60 ml/min/1.73m2    Calcium 8.9 8.3 - 10.4 MG/DL   MAGNESIUM    Collection Time: 01/16/22  6:15 PM   Result Value Ref Range    Magnesium 1.9 1.8 - 2.4 mg/dL   GLUCOSE, POC    Collection Time: 01/16/22  6:26 PM   Result Value Ref Range    Glucose (POC) 137 (H) 65 - 100 mg/dL    Performed by [employer]    GLUCOSTABILIZER    Collection Time: 01/16/22  6:28 PM   Result Value Ref Range    Glucose 137 mg/dL    Insulin order 1.5 units/hour    Insulin adminstered 1.5 units/hour    Multiplier 0.020     Low target 150 mg/dL    High target 250 mg/dL    D50 order 0.0 ml    D50 administered 0.00 ml    Minutes until next BG 60 min    Order initials [facility]     Administered initials [facility]     GLSCOM Comments     GLUCOSE, POC    Collection Time: 01/16/22  7:35 PM   Result Value Ref Range    Glucose (POC) 181 (H) 65 - 100 mg/dL    Performed by Brian Verma    Collection Time: 01/16/22  7:36 PM   Result Value Ref Range    Glucose 181 mg/dL    Insulin order 2.4 units/hour    Insulin adminstered 2.4 units/hour    Multiplier 0.020     Low target 150 mg/dL    High target 250 mg/dL    D50 order 0.0 ml    D50 administered 0.00 ml    Minutes until next BG 60 min    Order initials HEATHER     Administered initials HEATHER     GLSCOM Comments     GLUCOSE, POC    Collection Time: 01/16/22  8:36 PM   Result Value Ref Range    Glucose (POC) 147 (H) 65 - 100 mg/dL    Performed by [employer]    Collection Time: 01/16/22  8:37 PM   Result Value Ref Range    Glucose 147 mg/dL    Insulin order 0.9 units/hour    Insulin adminstered 0.9 units/hour    Multiplier 0.010     Low target 150 mg/dL    High target 250 mg/dL    D50 order 0.0 ml    D50 administered 0.00 ml    Minutes until next BG 60 min    Order initials HEATHER     Administered initials HEATHER     GLSCOM Comments     GLUCOSE, POC    Collection Time: 01/16/22  9:34 PM   Result Value Ref Range    Glucose (POC) 161 (H) 65 - 100 mg/dL    Performed by [employer]    Collection Time: 01/16/22  9:35 PM   Result Value Ref Range    Glucose 161 mg/dL    Insulin order 1.0 units/hour    Insulin adminstered 1.0 units/hour    Multiplier 0.010     Low target 150 mg/dL    High target 250 mg/dL    D50 order 0.0 ml    D50 administered 0.00 ml    Minutes until next BG 60 min    Order initials HEATHER     Administered initials HEATHER     GLSCOM Comments     GLUCOSE, POC    Collection Time: 01/16/22 10:27 PM   Result Value Ref Range    Glucose (POC) 179 (H) 65 - 100 mg/dL    Performed by [employer]    Collection Time: 01/16/22 10:28 PM   Result Value Ref Range    Glucose 179 mg/dL    Insulin order 1.2 units/hour    Insulin adminstered 1.2 units/hour    Multiplier 0.010     Low target 150 mg/dL    High target 250 mg/dL    D50 order 0.0 ml    D50 administered 0.00 ml    Minutes until next BG 60 min    Order initials HEATHER     Administered initials HEATEHR GLSCOM Comments     GLUCOSE, POC    Collection Time: 01/16/22 11:30 PM   Result Value Ref Range    Glucose (POC) 170 (H) 65 - 100 mg/dL    Performed by Brian Verma    Collection Time: 01/16/22 11:31 PM   Result Value Ref Range    Glucose 170 mg/dL    Insulin order 1.1 units/hour    Insulin adminstered 1.1 units/hour    Multiplier 0.010     Low target 150 mg/dL    High target 250 mg/dL    D50 order 0.0 ml    D50 administered 0.00 ml    Minutes until next BG 60 min    Order initials HEATHER     Administered initials HEATHER     GLSCOM Comments     GLUCOSE, POC    Collection Time: 01/17/22 12:36 AM   Result Value Ref Range    Glucose (POC) 178 (H) 65 - 100 mg/dL    Performed by Brian Verma    Collection Time: 01/17/22 12:37 AM   Result Value Ref Range    Glucose 178 mg/dL    Insulin order 1.2 units/hour    Insulin adminstered 1.2 units/hour    Multiplier 0.010     Low target 150 mg/dL    High target 250 mg/dL    D50 order 0.0 ml    D50 administered 0.00 ml    Minutes until next BG 60 min    Order initials HEATHER     Administered initials HEATHER     GLSCOM Comments     METABOLIC PANEL, BASIC    Collection Time: 01/17/22 12:41 AM   Result Value Ref Range    Sodium 148 (H) 136 - 145 mmol/L    Potassium 3.4 (L) 3.5 - 5.1 mmol/L    Chloride 112 (H) 98 - 107 mmol/L    CO2 27 21 - 32 mmol/L    Anion gap 9 7 - 16 mmol/L    Glucose 199 (H) 65 - 100 mg/dL    BUN 21 8 - 23 MG/DL    Creatinine 0.65 0.6 - 1.0 MG/DL    GFR est AA >60 >60 ml/min/1.73m2    GFR est non-AA >60 >60 ml/min/1.73m2    Calcium 8.7 8.3 - 10.4 MG/DL   MAGNESIUM    Collection Time: 01/17/22 12:41 AM   Result Value Ref Range    Magnesium 2.9 (H) 1.8 - 2.4 mg/dL   GLUCOSE, POC    Collection Time: 01/17/22  1:39 AM   Result Value Ref Range    Glucose (POC) 182 (H) 65 - 100 mg/dL    Performed by Brian Verma    Collection Time: 01/17/22  1:45 AM   Result Value Ref Range    Glucose 182 mg/dL    Insulin order 1.2 units/hour    Insulin adminstered 1.2 units/hour    Multiplier 0.010     Low target 150 mg/dL    High target 250 mg/dL    D50 order 0.0 ml    D50 administered 0.00 ml    Minutes until next BG 60 min    Order initials HEATHER     Administered initials HEATHER     GLSCOM Comments     GLUCOSE, POC    Collection Time: 01/17/22  2:49 AM   Result Value Ref Range    Glucose (POC) 196 (H) 65 - 100 mg/dL    Performed by [employer]    Collection Time: 01/17/22  2:50 AM   Result Value Ref Range    Glucose 196 mg/dL    Insulin order 1.4 units/hour    Insulin adminstered 1.4 units/hour    Multiplier 0.010     Low target 150 mg/dL    High target 250 mg/dL    D50 order 0.0 ml    D50 administered 0.00 ml    Minutes until next BG 60 min    Order initials HEATHER     Administered initials HEATHER     GLSCOM Comments     GLUCOSE, POC    Collection Time: 01/17/22  4:00 AM   Result Value Ref Range    Glucose (POC) 188 (H) 65 - 100 mg/dL    Performed by [employer]    Collection Time: 01/17/22  4:00 AM   Result Value Ref Range    Glucose 188 mg/dL    Insulin order 1.3 units/hour    Insulin adminstered 1.3 units/hour    Multiplier 0.010     Low target 150 mg/dL    High target 250 mg/dL    D50 order 0.0 ml    D50 administered 0.00 ml    Minutes until next BG 60 min    Order initials HEATHER     Administered initials HEATHER     GLSCOM Comments     CBC W/O DIFF    Collection Time: 01/17/22  4:52 AM   Result Value Ref Range    WBC 12.2 (H) 4.3 - 11.1 K/uL    RBC 4.58 4.05 - 5.2 M/uL    HGB 12.5 11.7 - 15.4 g/dL    HCT 39.3 35.8 - 46.3 %    MCV 85.8 79.6 - 97.8 FL    MCH 27.3 26.1 - 32.9 PG    MCHC 31.8 31.4 - 35.0 g/dL    RDW 15.0 (H) 11.9 - 14.6 %    PLATELET 925 860 - 287 K/uL    MPV 8.5 (L) 9.4 - 12.3 FL    ABSOLUTE NRBC 0.00 0.0 - 0.2 K/uL   METABOLIC PANEL, BASIC    Collection Time: 01/17/22  4:52 AM   Result Value Ref Range    Sodium 148 (H) 136 - 145 mmol/L    Potassium 3.7 3.5 - 5.1 mmol/L    Chloride 113 (H) 98 - 107 mmol/L    CO2 28 21 - 32 mmol/L    Anion gap 7 7 - 16 mmol/L    Glucose 205 (H) 65 - 100 mg/dL    BUN 22 8 - 23 MG/DL    Creatinine 0.63 0.6 - 1.0 MG/DL    GFR est AA >60 >60 ml/min/1.73m2    GFR est non-AA >60 >60 ml/min/1.73m2    Calcium 8.6 8.3 - 10.4 MG/DL   MAGNESIUM    Collection Time: 01/17/22  4:52 AM   Result Value Ref Range    Magnesium 1.7 (L) 1.8 - 2.4 mg/dL   GLUCOSE, POC    Collection Time: 01/17/22  5:01 AM   Result Value Ref Range    Glucose (POC) 194 (H) 65 - 100 mg/dL    Performed by Joe Erickson    Collection Time: 01/17/22  5:02 AM   Result Value Ref Range    Glucose 194 mg/dL    Insulin order 1.3 units/hour    Insulin adminstered 1.3 units/hour    Multiplier 0.010     Low target 150 mg/dL    High target 250 mg/dL    D50 order 0.0 ml    D50 administered 0.00 ml    Minutes until next BG 60 min    Order initials HEATHER     Administered initials HEATHER     GLSCOM Comments     BLOOD GAS, ARTERIAL POC    Collection Time: 01/17/22  5:34 AM   Result Value Ref Range    Device: BIPAP MASK      FIO2 (POC) 30 %    pH (POC) 7.51 (H) 7.35 - 7.45      pCO2 (POC) 30.8 (L) 35 - 45 MMHG    pO2 (POC) 124 (H) 75 - 100 MMHG    HCO3 (POC) 24.9 22 - 26 MMOL/L    sO2 (POC) 99.2 (H) 95 - 98 %    Base excess (POC) 2.6 mmol/L    PEEP/CPAP (POC) 8 cmH2O    PIP (POC) 14      Allens test (POC) Positive      Inspiratory Time 0.9 sec    Total resp.  rate 19      Site RIGHT RADIAL      Specimen type (POC) ARTERIAL      Performed by Inez     Respiratory comment: 1414/8    GLUCOSE, POC    Collection Time: 01/17/22  6:12 AM   Result Value Ref Range    Glucose (POC) 195 (H) 65 - 100 mg/dL    Performed by Joe Erickson    Collection Time: 01/17/22  6:14 AM   Result Value Ref Range    Glucose 195 mg/dL    Insulin order 1.4 units/hour    Insulin adminstered 1.4 units/hour    Multiplier 0.010     Low target 150 mg/dL    High target 250 mg/dL    D50 order 0.0 ml    D50 administered 0.00 ml    Minutes until next BG 60 min    Order initials HEATHER     Administered initials HEATHER     GLSCOM Comments     GLUCOSE, POC    Collection Time: 01/17/22  7:27 AM   Result Value Ref Range    Glucose (POC) 187 (H) 65 - 100 mg/dL    Performed by Kaylen Cheng    Collection Time: 01/17/22  7:27 AM   Result Value Ref Range    Glucose 187 mg/dL    Insulin order 1.3 units/hour    Insulin adminstered 1.3 units/hour    Multiplier 0.010     Low target 150 mg/dL    High target 250 mg/dL    D50 order 0.0 ml    D50 administered 0.00 ml    Minutes until next BG 60 min    Order initials TK     Administered initials TK     GLSCOM Comments     METABOLIC PANEL, BASIC    Collection Time: 01/17/22  7:45 AM   Result Value Ref Range    Sodium 149 (H) 136 - 145 mmol/L    Potassium 3.5 3.5 - 5.1 mmol/L    Chloride 112 (H) 98 - 107 mmol/L    CO2 29 21 - 32 mmol/L    Anion gap 8 7 - 16 mmol/L    Glucose 202 (H) 65 - 100 mg/dL    BUN 21 8 - 23 MG/DL    Creatinine 0.59 (L) 0.6 - 1.0 MG/DL    GFR est AA >60 >60 ml/min/1.73m2    GFR est non-AA >60 >60 ml/min/1.73m2    Calcium 8.5 8.3 - 10.4 MG/DL   MAGNESIUM    Collection Time: 01/17/22  7:45 AM   Result Value Ref Range    Magnesium 2.8 (H) 1.8 - 2.4 mg/dL   GLUCOSE, POC    Collection Time: 01/17/22  9:09 AM   Result Value Ref Range    Glucose (POC) 170 (H) 65 - 100 mg/dL    Performed by Kaylen Cheng    Collection Time: 01/17/22  9:09 AM   Result Value Ref Range    Glucose 170 mg/dL    Insulin order 1.1 units/hour    Insulin adminstered 1.1 units/hour    Multiplier 0.010     Low target 150 mg/dL    High target 250 mg/dL    D50 order 0.0 ml    D50 administered 0.00 ml    Minutes until next BG 60 min    Order initials TK     Administered initials TK     GLSCOM Comments     GLUCOSE, POC    Collection Time: 01/17/22 10:04 AM   Result Value Ref Range    Glucose (POC) 180 (H) 65 - 100 mg/dL    Performed by Kaylen Cheng Collection Time: 01/17/22 10:09 AM   Result Value Ref Range    Glucose 180 mg/dL    Insulin order 1.2 units/hour    Insulin adminstered 1.2 units/hour    Multiplier 0.010     Low target 150 mg/dL    High target 250 mg/dL    D50 order 0.0 ml    D50 administered 0.00 ml    Minutes until next BG 60 min    Order initials TK     Administered initials TK     GLSCOM Comments     GLUCOSE, POC    Collection Time: 01/17/22 11:09 AM   Result Value Ref Range    Glucose (POC) 173 (H) 65 - 100 mg/dL    Performed by Primitivo Aviles    Collection Time: 01/17/22 11:10 AM   Result Value Ref Range    Glucose 173 mg/dL    Insulin order 1.1 units/hour    Insulin adminstered 1.1 units/hour    Multiplier 0.010     Low target 150 mg/dL    High target 250 mg/dL    D50 order 0.0 ml    D50 administered 0.00 ml    Minutes until next BG 60 min    Order initials TK     Administered initials TK     GLSCOM Comments     GLUCOSE, POC    Collection Time: 01/17/22  1:18 PM   Result Value Ref Range    Glucose (POC) 163 (H) 65 - 100 mg/dL    Performed by Time David    C REACTIVE PROTEIN, QT    Collection Time: 01/17/22  1:51 PM   Result Value Ref Range    C-Reactive protein 1.4 (H) 0.0 - 0.9 mg/dL   D DIMER    Collection Time: 01/17/22  1:51 PM   Result Value Ref Range    D DIMER 1.88 (H) <0.56 ug/ml(FEU)   PROCALCITONIN    Collection Time: 01/17/22  1:51 PM   Result Value Ref Range    Procalcitonin <0.05 0.00 - 0.49 ng/mL   FERRITIN    Collection Time: 01/17/22  1:51 PM   Result Value Ref Range    Ferritin 130 8 - 029 NG/ML   METABOLIC PANEL, BASIC    Collection Time: 01/17/22  1:51 PM   Result Value Ref Range    Sodium 146 (H) 136 - 145 mmol/L    Potassium 3.1 (L) 3.5 - 5.1 mmol/L    Chloride 111 (H) 98 - 107 mmol/L    CO2 28 21 - 32 mmol/L    Anion gap 7 7 - 16 mmol/L    Glucose 184 (H) 65 - 100 mg/dL    BUN 20 8 - 23 MG/DL    Creatinine 0.49 (L) 0.6 - 1.0 MG/DL    GFR est AA >60 >60 ml/min/1.73m2    GFR est non-AA >60 >60 ml/min/1.73m2    Calcium 8.6 8.3 - 10.4 MG/DL   MAGNESIUM    Collection Time: 01/17/22  1:51 PM   Result Value Ref Range    Magnesium 1.7 (L) 1.8 - 2.4 mg/dL   PHOSPHORUS    Collection Time: 01/17/22  1:51 PM   Result Value Ref Range    Phosphorus 2.8 2.3 - 3.7 MG/DL   GLUCOSE, POC    Collection Time: 01/17/22  5:05 PM   Result Value Ref Range    Glucose (POC) 152 (H) 65 - 100 mg/dL    Performed by Ophelia Beck    GLUCOSE, POC    Collection Time: 01/17/22  9:01 PM   Result Value Ref Range    Glucose (POC) 163 (H) 65 - 100 mg/dL    Performed by Busch (Sarah)    GLUCOSE, POC    Collection Time: 01/18/22  2:17 AM   Result Value Ref Range    Glucose (POC) 148 (H) 65 - 100 mg/dL    Performed by Mauricio 26, POC    Collection Time: 01/18/22  5:01 AM   Result Value Ref Range    Glucose (POC) 145 (H) 65 - 100 mg/dL    Performed by Ingrid    GLUCOSE, POC    Collection Time: 01/18/22 10:13 AM   Result Value Ref Range    Glucose (POC) 169 (H) 65 - 100 mg/dL    Performed by Jonatan    CBC W/O DIFF    Collection Time: 01/18/22 10:58 AM   Result Value Ref Range    WBC 11.7 (H) 4.3 - 11.1 K/uL    RBC 4.48 4.05 - 5.2 M/uL    HGB 12.2 11.7 - 15.4 g/dL    HCT 38.1 35.8 - 46.3 %    MCV 85.0 79.6 - 97.8 FL    MCH 27.2 26.1 - 32.9 PG    MCHC 32.0 31.4 - 35.0 g/dL    RDW 14.8 (H) 11.9 - 14.6 %    PLATELET 400 802 - 892 K/uL    MPV 9.7 9.4 - 12.3 FL    ABSOLUTE NRBC 0.00 0.0 - 0.2 K/uL   METABOLIC PANEL, BASIC    Collection Time: 01/18/22 10:58 AM   Result Value Ref Range    Sodium 143 136 - 145 mmol/L    Potassium 4.0 3.5 - 5.1 mmol/L    Chloride 110 (H) 98 - 107 mmol/L    CO2 21 21 - 32 mmol/L    Anion gap 12 7 - 16 mmol/L    Glucose 199 (H) 65 - 100 mg/dL    BUN 18 8 - 23 MG/DL    Creatinine 0.38 (L) 0.6 - 1.0 MG/DL    GFR est AA >60 >60 ml/min/1.73m2    GFR est non-AA >60 >60 ml/min/1.73m2    Calcium 8.8 8.3 - 10.4 MG/DL       All Micro Results     Procedure Component Value Units Date/Time CULTURE, BLOOD [[id number]] Collected: 01/12/22 0229    Order Status: Completed Specimen: Blood Updated: 01/17/22 0801     Special Requests: --        RIGHT  Antecubital       Culture result: NO GROWTH 5 DAYS       CULTURE, BLOOD [[id number]] Collected: 01/12/22 1249    Order Status: Completed Specimen: Blood Updated: 01/17/22 0801     Special Requests: --        RIGHT  FOREARM       Culture result: NO GROWTH 5 DAYS       SARS-COV-2, PCR [[id number]]  (Abnormal) Collected: 01/16/22 0808    Order Status: Completed Specimen: Nasopharyngeal Updated: 01/16/22 1645     Specimen source Nasopharyngeal        SARS-CoV-2 Detected        Comment:      The specimen is POSITIVE for SARS-CoV-2, the novel coronavirus associated with COVID-19. This test has been authorized by the FDA under an Emergency Use Authorization (EUA) for use by authorized laboratories.         Fact sheet for Healthcare Providers: ConventionUpdate.co.nz  Fact sheet for Patients: https://fda.gov/media/099402/download       Methodology: RT-PCR  RESULTS VERIFIED, PHONED TO AND READ BACK BY  TAMRA Wade RN ON 01/16/22 @1645, ADS         FUNGUS CULTURE AND SMEAR [[id number]] Collected: 01/15/22 1608    Order Status: Canceled Specimen: Other     FUNGUS CULTURE AND SMEAR [[id number]] Collected: 01/15/22 1517    Order Status: Canceled Specimen: Other     CULTURE, URINE [[id number]]  (Abnormal)  (Susceptibility) Collected: 01/12/22 0227    Order Status: Completed Specimen: Urine from Clean catch Updated: 01/15/22 0733     Special Requests: NO SPECIAL REQUESTS        Culture result:       >100,000 COLONIES/mL ESCHERICHIA COLI                  50,000-100,000 COLONIES/mL NORMAL SKIN SNOW ISOLATED          COVID-19 RAPID TEST [[id number]] Collected: 01/12/22 3864    Order Status: Completed Specimen: Nasopharyngeal Updated: 01/12/22 0320     Specimen source NASAL        COVID-19 rapid test Not detected        Comment:      The specimen is NEGATIVE for SARS-CoV-2, the novel coronavirus associated with COVID-19. A negative result does not rule out COVID-19. This test has been authorized by the FDA under an Emergency Use Authorization (EUA) for use by authorized laboratories. Fact sheet for Healthcare Providers: ConventionUpdate.co.nz  Fact sheet for Patients: ConventionUpdate.co.nz       Methodology: Isothermal Nucleic Acid Amplification               Other Studies:  No results found.     Current Meds:  Current Facility-Administered Medications   Medication Dose Route Frequency    TPN ADULT - dextrose 5% amino acid 4.25%   IntraVENous QPM    insulin glargine (LANTUS) injection 20 Units  20 Units SubCUTAneous DAILY    insulin lispro (HUMALOG) injection   SubCUTAneous Q4H    dexamethasone (DECADRON) 10 mg/mL injection 6 mg  6 mg IntraVENous Q24H    alcohol 62% (NOZIN) nasal  1 Ampule  1 Ampule Topical Q12H    dextrose 5% infusion  50 mL/hr IntraVENous CONTINUOUS    sucralfate (CARAFATE) tablet 1 g  1 g Oral AC&HS    cefTRIAXone (ROCEPHIN) 1 g in 0.9% sodium chloride (MBP/ADV) 50 mL MBP  1 g IntraVENous Q24H    NUTRITIONAL SUPPORT ELECTROLYTE PRN ORDERS   Does Not Apply PRN    pantoprazole (PROTONIX) 40 mg in 0.9% sodium chloride 10 mL injection  40 mg IntraVENous BID    albuterol (PROVENTIL VENTOLIN) nebulizer solution 2.5 mg  2.5 mg Nebulization Q4H PRN    nystatin (MYCOSTATIN) 100,000 unit/gram powder   Topical BID    morphine injection 2 mg  2 mg IntraVENous Q4H PRN    sodium chloride (NS) flush 5-40 mL  5-40 mL IntraVENous Q8H    sodium chloride (NS) flush 5-40 mL  5-40 mL IntraVENous PRN    acetaminophen (TYLENOL) tablet 650 mg  650 mg Oral Q6H PRN    Or    acetaminophen (TYLENOL) suppository 650 mg  650 mg Rectal Q6H PRN    polyethylene glycol (MIRALAX) packet 17 g  17 g Oral DAILY PRN    ondansetron (ZOFRAN ODT) tablet 4 mg  4 mg Oral Q8H PRN    Or    ondansetron [facility]) injection 4 mg  4 mg IntraVENous Q6H PRN    enoxaparin (LOVENOX) injection 40 mg  40 mg SubCUTAneous DAILY    magnesium hydroxide (MILK OF MAGNESIA) 400 mg/5 mL oral suspension 30 mL  30 mL Oral DAILY PRN    alum-mag hydroxide-simeth (MYLANTA) oral suspension 15 mL  15 mL Oral Q6H PRN    amitriptyline (ELAVIL) tablet 25 mg  25 mg Oral QHS PRN    atorvastatin (LIPITOR) tablet 20 mg  20 mg Oral DAILY    aspirin delayed-release tablet 81 mg  81 mg Oral DAILY    budesonide-formoterol (SYMBICORT) 80-4.5 mcg inhaler  2 Puff Inhalation BID RT    sertraline (ZOLOFT) tablet 100 mg  100 mg Oral DAILY    HYDROcodone-acetaminophen (NORCO) 5-325 mg per tablet 1 Tablet  1 Tablet Oral Q8H PRN    docusate sodium (COLACE) capsule 100 mg  100 mg Oral BID       Signed:  Luis Alfredo Mustafa MD    Part of this note may have been written by using a voice dictation software. The note has been proof read but may still contain some grammatical/other typographical errors.

## 2022-01-18 NOTE — PROGRESS NOTES
Patient received to room 821 via bed and transport  Admission assessment complete  Patient is alert  Aphasic  Respirations even and unlabored on 2L NC  No signs of distress  Nair draining yellow urine   Call light and belongings within reach  Safety measures in place

## 2022-01-18 NOTE — PROGRESS NOTES
Infectious Disease Progress Note    Today's Date: 2022   Admit Date: 2022    Impression:   · E coli UTI (22)  · Acute respiratory failure with hypoxia - COVID positive,  - on 6L via nasal cannula  · Sinusitis - presumed due to COVID  · Encephalopathy in the setting of DKA  · Asthma  · Type II DM; A1c 7.0  · CVA  · Obesity  · Left humeral head and neck fracture   · Stress Gastritis; coffee ground emesis; seen by GI    Plan:   · Treat with ceftriaxone 1gm q24hrs x 5 days as outlined, eot 22  · I suspect encephalopathy is primarily DKA related. Prashant De La Rosa can cause a DKA syndrome with normal sugars, and encephalopathy is commonly associated with this syndrome. · Low suspicion for fungal sinusitis at this time given her A1C.  · **Will sign off at this time. Please call with questions or concerns. Anti-infectives:   · IV Zosyn (-)-CTX -  · IV Zithromax (-1/15)  · IV Diflucan (1/15-1/15)    Subjective: Interval history: alert on HFNC. No pressors. She denies any complaints. Allergies   Allergen Reactions    Other Food Swelling     Shell fish    Contrast Agent [Iodine] Anaphylaxis    Banana Itching and Swelling     Mouth itches and swells        Review of Systems:  Review of systems not obtained due to patient factors.     Objective:     Visit Vitals  BP (!) 140/73 (BP 1 Location: Right upper arm, BP Patient Position: At rest)   Pulse (!) 103   Temp 98 °F (36.7 °C)   Resp 20   Ht 5' 2\" (1.575 m)   Wt 99.8 kg (220 lb)   SpO2 100%   BMI 40.24 kg/m²     Temp (24hrs), Av.4 °F (36.9 °C), Min:98 °F (36.7 °C), Max:98.8 °F (37.1 °C)       Lines:  Central Venous Catheter:       Physical Exam:    General:  Alert, responds to some questions   Eyes:  Sclera anicteric   Mouth/Throat: Mucous membranes dry, dry blood oral cavity, poor dentition   Neck: Supple   Lungs:   Breathing comfortably   CV:     Abdomen:   non-distended   Extremities: No cyanosis, bilateral LE edema Skin: no acute rash or lesions   Lymph nodes:    Musculoskeletal: No swelling or deformity   Lines/Devices:  Intact, no erythema, drainage or tenderness   Psych: alert following commands       Data Review:     CBC:  Recent Labs     01/17/22  0452 01/16/22  0334   WBC 12.2* 14.4*   HGB 12.5 13.3   HCT 39.3 42.2    434       BMP:  Recent Labs     01/17/22  1351 01/17/22  0745 01/17/22  0452   CREA 0.49* 0.59* 0.63   BUN 20 21 22   * 149* 148*   K 3.1* 3.5 3.7   * 112* 113*   CO2 28 29 28   AGAP 7 8 7   * 202* 205*       LFTS:  No results for input(s): TBILI, ALT, AP, TP, ALB in the last 72 hours. No lab exists for component: SGOT    Microbiology:     All Micro Results     Procedure Component Value Units Date/Time    CULTURE, BLOOD [[id number]] Collected: 01/12/22 0229    Order Status: Completed Specimen: Blood Updated: 01/17/22 0801     Special Requests: --        RIGHT  Antecubital       Culture result: NO GROWTH 5 DAYS       CULTURE, BLOOD [[id number]] Collected: 01/12/22 1249    Order Status: Completed Specimen: Blood Updated: 01/17/22 0801     Special Requests: --        RIGHT  FOREARM       Culture result: NO GROWTH 5 DAYS       SARS-COV-2, PCR [[id number]]  (Abnormal) Collected: 01/16/22 0808    Order Status: Completed Specimen: Nasopharyngeal Updated: 01/16/22 1645     Specimen source Nasopharyngeal        SARS-CoV-2 Detected        Comment:      The specimen is POSITIVE for SARS-CoV-2, the novel coronavirus associated with COVID-19. This test has been authorized by the FDA under an Emergency Use Authorization (EUA) for use by authorized laboratories.         Fact sheet for Healthcare Providers: kstattoo.com  Fact sheet for Patients: https://fda.gov/media/959779/download       Methodology: RT-PCR  RESULTS VERIFIED, PHONED TO AND READ BACK BY  TAMRA Wade RN ON 01/16/22 @1640, ADS         FUNGUS CULTURE AND SMEAR [[id number]] Collected: 01/15/22 1608    Order Status: Canceled Specimen: Other     FUNGUS CULTURE AND SMEAR [[id number]] Collected: 01/15/22 1517    Order Status: Canceled Specimen: Other     CULTURE, URINE [[id number]]  (Abnormal)  (Susceptibility) Collected: 22    Order Status: Completed Specimen: Urine from Clean catch Updated: 01/15/22 0733     Special Requests: NO SPECIAL REQUESTS        Culture result:       >100,000 COLONIES/mL ESCHERICHIA COLI                  50,000-100,000 COLONIES/mL NORMAL SKIN SNOW ISOLATED          COVID-19 RAPID TEST [[id number]] Collected: 22 0520    Order Status: Completed Specimen: Nasopharyngeal Updated: 22 032     Specimen source NASAL        COVID-19 rapid test Not detected        Comment:      The specimen is NEGATIVE for SARS-CoV-2, the novel coronavirus associated with COVID-19. A negative result does not rule out COVID-19. This test has been authorized by the FDA under an Emergency Use Authorization (EUA) for use by authorized laboratories. Fact sheet for Healthcare Providers: ConventionUpdate.co.nz  Fact sheet for Patients: ConventionUpdate.co.nz       Methodology: Isothermal Nucleic Acid Amplification               Imagin22 CXR: IMPRESSION  Lungs appear clear.     Signed By: Nitin Fenton NP     2022

## 2022-01-19 NOTE — PROGRESS NOTES
LTG: Patient will tolerate least restrictive diet without overt signs or symptoms of airway compromise. STG: Patient will tolerate po trials with SLP only without overt signs or symptoms of airway compromise. STG: Patient will participate in modified barium swallow study as clinically indicated. SPEECH LANGUAGE PATHOLOGY: DYSPHAGIA- Daily Note 1    NAME/AGE/GENDER: Leonard Lowery is a 57 y.o. female  DATE: 1/19/2022  PRIMARY DIAGNOSIS: Pneumonia [J18.9]      ICD-10: Treatment Diagnosis: R13.12 Dysphagia, Oropharyngeal Phase    RECOMMENDATIONS   DIET:    NPO; short term alternate means of nutrition/hydration/meds    MEDICATIONS: Non-oral     PRECAUTIONS, MODIFICATIONS, AND STRATEGIES  · Oral care every 3 hours     RECOMMENDATIONS FOR CONTINUED SPEECH THERAPY:   YES: Anticipate need for ongoing speech therapy during this hospitalization. and likely next level of care. ASSESSMENT   Patient continues to present with s/sx of oropharyngeal dysphagia and noted impaired nasality. Patient demonstrates double swallow and strong coughing/choking episode with liquids. right gaze/head positioning and unable to get to midline? Recommended continue strict NPO with non-oral medication. Frequent oral care. Will follow for ongoing po trials. May benefit from modified barium swallow study later this week. EDUCATION:  · Recommendations discussed with Patient and RN   · MD via [facility]     REHABILITATION POTENTIAL FOR STATED GOALS: Good    PLAN    FREQUENCY/DURATION:   Continue to follow patient 3 times a week for duration of hospital stay to address above goals.  Recommendations for next treatment session: Next treatment session will address diet tolerance and po trials    CONTINUATION OF SKILLED SERVICES/MEDICAL NECESSITY:   Patient is expected to demonstrate progress in  swallow strength, swallow timeliness, swallow function, diet tolerance and swallow safety in order to  improve swallow safety, work toward diet advancement and decrease aspiration risk.  Patient continues to require skilled intervention due to dysphagia. .       SUBJECTIVE   Awake. ? ?Gaze/head to right and Unable to get to midline? ?(still unclear why-messaged MD via [employer]) Also, patient has no awareness of this and denies any difficulty. Word finding difficulty. Delayed responses. Answers in yes/no primarily. \"i'm thirsty\"    Oxygen Device: nasal cannula  Pain: Pain Scale 1: Numeric (0 - 10)  Pain Intensity 1: 0    History of Present Injury/Illness: Ms. Sophia Hernandez  has a past medical history of Asthma, Diabetes mellitus ([facility] [address] Gastroparesis (2021), History of CVA (cerebrovascular accident), Neuropathy, Obesity, and Sleep apnea. . She also  has a past surgical history that includes hx  section; hx breast biopsy; hx refractive surgery; pr egd deliver thermal energy sphnctr/cardia gerd (2021); hx colonoscopy (2018); and pr egd deliver thermal energy sphnctr/cardia gerd (2021). PRECAUTIONS/ALLERGIES: Other food, Contrast agent [iodine], and Banana     Problem List:  (Impairments causing functional limitations)  1.oropharyngeal dysphagia  2. Impaired language abilities       Orientation:  first name   Oriented to birthday in verbal field of 2   Disoriented to month in verbal field of 2  OBJECTIVE   Dysphagia treatment: po trials  Patient's nasality appears impaired. No overt s/sx aspiration with ice chips on 3/3 trials. Presented thin via tsp. Mild oral delay. Double swallow with appearance that needed to cough, but rather elicited additional 2 swallows. Presented again, patient demonstrated immediate s/sx aspiration, strong coughing. Concerns for airway protection. Positioning also could be factor given head turn to right and inability to reach midline.        Tool Used: Dysphagia Outcome and Severity Scale (SORAIDA)    Score Comments   Normal Diet  [] 7 With no strategies or extra time needed   Functional Swallow [] 6 May have mild oral or pharyngeal delay   Mild Dysphagia  [] 5 Which may require one diet consistency restricted    Mild-Moderate Dysphagia  [] 4 With 1-2 diet consistencies restricted   Moderate Dysphagia  [] 3 With 2 or more diet consistencies restricted   Moderate-Severe Dysphagia  [] 2 With partial PO strategies (trials with ST only)   Severe Dysphagia  [] 1 With inability to tolerate any PO safely      Score:  Initial: 1 Most Recent: 1 (Date 01/19/22 )   Interpretation of Tool: The Dysphagia Outcome and Severity Scale (SORAIDA) is a simple, easy-to-use, 7-point scale developed to systematically rate the functional severity of dysphagia based on objective assessment and make recommendations for diet level, independence level, and type of nutrition.      INTERDISCIPLINARY COLLABORATION: RN    After treatment position/precautions:  · Upright in bed  · RN notified  · MD notified     Total Treatment Duration:   Time In: 0469  Time Out: Filipe Roberts 71., INST MEDICO DEL [facility], [id number] [place] [address]

## 2022-01-19 NOTE — PROGRESS NOTES
Received report and assumed care of patient. Currently on 3L HFNC. Shakes head in response to questions. Denies and pain or SOB. Left arm in sling. Patient repositioned. Nair in place - patent and draining. Call light and belongings within reach. Will monitor.

## 2022-01-19 NOTE — PROGRESS NOTES
Patient currently on 3L HFNC. O2 sat 91-93%. Denies pain or sob at this time. Nair remains patent. NGT with tube feeds going. Call light and belongings within reach. Will monitor.

## 2022-01-19 NOTE — PROGRESS NOTES
Hospitalist Progress Note   Admit Date:  2022 10:04 PM   Name:  Clari Villa   Age:  57 y.o. Sex:  female  :  1960   MRN:  [id number]   Room:  [facility]/    Presenting Complaint: Fall    Reason(s) for Admission: Pneumonia [J18.9]     Hospital Course & Interval History:     Ms. Siomara Gracia is a 58 yo female with PMH of asthma, DM2, CVA, obesity, neuropathy, JESSICA on CPAP admitted s/p fall with recent left humeral fracture that occurred [place] on [employer]. Ortho consulted and and recommends CT left shoulder and outpatient followup with Dr. Jennifer Hsu. Patient also found hypoxic and met sepsis criteria due to fever / leukocytosis. Sepsis work-up showed LLL pneumonia and UTI. COVID negative. She was started on empiric antibiotic. Patient was found to be in diabetic ketoacidosis on  and started on glucose stabilizer with improvement. Patient placed back on insulin gtt. and bicarb for recurrent DKA on . Patient initially required BiPAP and then transitioned to nasal cannula. Patient started spiking high-grade fevers on .  notified family member positive for COVID. Patient was rechecked for COVID and came positive. CT head was done for concern for confusion and showed no strokes but there is a concern for a fungal right maxillary sinus infection. She was started on fluconazole. Infectious disease consulted and recommend no invasion of bone on imaging and no finding on skin, eye or palate to suggest Mucor. She could possibly have an Aspergillus chronic sinusitis, and recommend to discontinue fluconazole and continue to treat E. coli UTI. Fungitell and Aspergillus antigen from serum ordered. Patient had an episode of coffee-ground emesis on 1/15. GI consulted and recommend continue PPI. Subjective (22): Patient seen and evaluated. .  Not answering questions but following commands. On 3 L nasal cannula.   Limited review of system due to patient current condition. Patient has failed swallow test yesterday. Currently n.p.o. Assessment & Plan:     Diabetic ketoacidosis/diabetes mellitus:  A1c 7 on 1/14  Found in diabetic ketoacidosis on 1/13, started on glucose stabilizer, bicarb gtt. and insulin gtt. with correction. Placed back on insulin GTT and bicarb for recurrent DKA on 1/16  Anion gap closed and acidosis resolved on 1/17 and patient transitioned to subcu insulin regimen   1/18: Blood glucose  in 160s. Continue Lantus 20 units daily and sliding scale    Acute respiratory failure with hypoxia  Sepsis:  COVID-19 pneumonia:  COVID-positive on 1/16. Off BiPAP now  On 4 L nasal cannula, wean as tolerated  Continue CPAP at night  Cultures till date negative  Check inflammatory markers  Continue Decadron 6 mg daily for 10 days  Supportive care  1/19: Continue supportive care and Decadron. On 3 L nasal cannula, wean as tolerated. Continue CPAP at night    Encephalopathy:  Metabolic versus Covid induce  Metabolic acidosis resolved  Continue to treat COVID as above  Frequent reorientation  Delirium precautions  Avoid sedatives  1/18: Mentation improving. PT/OT    Right maxillary sinus fungal infection:  CT head was done for concern for confusion and showed no strokes but there is a concern for a fungal right maxillary sinus infection. She was started on fluconazole. Infectious disease consulted and recommend no invasion of bone on imaging and no finding on skin, eye or palate to suggest Mucor. She could possibly have an Aspergillus chronic sinusitis, and recommend to discontinue fluconazole and continue to treat E. coli UTI. Fungitell and Aspergillus antigen from serum ordered. UTI . Urine culture positive for E. Coli  1/18: continue ceftriaxone, EOT 1/19    Oropharyngeal dysphagia:  1/18: Patient has failed speech eval, currently n.p.o. Will start patient on TPN. Nutrition consulted. Speech following.      Closed fracture of surgical neck of humerus  Outpatient ortho  Supportive care   PT,OT    Prior CVA:  Continued asa, lipitor    Coffee-ground emesis  GI recommend continue Protonix        Dispo/Discharge Planning:  TBD    Diet: N.p.o. for now given altered mental status  DVT PPx: lovenox  Code status: Full Code    Hospital Problems as of 1/19/2022 Never Reviewed          Codes Class Noted - Resolved POA    Hematemesis with nausea ICD-10-CM: K92.0  ICD-9-CM: 578.0, 787.02  1/15/2022 - Present No        Hypernatremia ICD-10-CM: E87.0  ICD-9-CM: 276.0  1/15/2022 - Present No        Encephalopathy ICD-10-CM: G93.40  ICD-9-CM: 348.30  1/15/2022 - Present Unknown        Metabolic acidosis GUQ-32-ND: E87.2  ICD-9-CM: 276.2  1/13/2022 - Present No        Pneumonia ICD-10-CM: J18.9  ICD-9-CM: 448  1/12/2022 - Present Yes        UTI (urinary tract infection) ICD-10-CM: N39.0  ICD-9-CM: 599.0  1/12/2022 - Present Yes        Hypoxia ICD-10-CM: R09.02  ICD-9-CM: 799.02  1/12/2022 - Present Yes        Obesity (Chronic) ICD-10-CM: E66.9  ICD-9-CM: 278.00  1/12/2022 - Present Yes        Closed fracture of surgical neck of humerus ICD-10-CM: S42.213A  ICD-9-CM: 812.01  1/12/2022 - Present Yes        Acute respiratory failure with hypoxia [facility]) ICD-10-CM: J96.01  ICD-9-CM: 518.81  1/12/2022 - Present Yes        * (Principal) Sepsis ([address] ICD-10-CM: A41.9  ICD-9-CM: 038.9, 995.91  1/12/2022 - Present Yes              Objective:     Patient Vitals for the past 24 hrs:   Temp Pulse Resp BP SpO2   01/19/22 1155 98.7 °F (37.1 °C) (!) 105 20 (!) 150/77 96 %   01/19/22 0852     99 %   01/19/22 0802 97.9 °F (36.6 °C) (!) 101 18 132/70 99 %   01/19/22 0800  (!) 102      01/19/22 0327  (!) 101      01/19/22 0325 97.9 °F (36.6 °C) (!) 104 18 (!) 144/81 97 %   01/18/22 2345     98 %   01/18/22 2235 98.1 °F (36.7 °C) (!) 109 20 (!) 153/88 98 %   01/18/22 2020     99 %   01/18/22 2013  (!) 108      01/18/22 1919 98.2 °F (36.8 °C) (!) 111 22 (!) 143/86 97 % 01/18/22 1425 99.8 °F (37.7 °C) (!) 108 20 (!) 149/82 98 %     Oxygen Therapy  O2 Sat (%): 96 % (01/19/22 1155)  Pulse via Oximetry: 99 beats per minute (01/19/22 0852)  O2 Device: Nasal cannula (01/19/22 0852)  Skin Assessment: Clean, dry, & intact (01/19/22 0802)  Skin Protection for O2 Device: No (01/19/22 0802)  Orientation: Bilateral (01/16/22 0745)  Location: Cheek (01/15/22 1900)  Interventions: Mouth Care (01/18/22 0703)  O2 Flow Rate (L/min): 3 l/min (01/19/22 0852)  FIO2 (%): 35 % (01/18/22 2345)    Estimated body mass index is 36.53 kg/m² as calculated from the following:    Height as of this encounter: 5' 2\" (1.575 m). Weight as of this encounter: 90.6 kg (199 lb 11.2 oz). Intake/Output Summary (Last 24 hours) at 1/19/2022 1351  Last data filed at 1/19/2022 0619  Gross per 24 hour   Intake    Output 3100 ml   Net -3100 ml         Physical Exam:     Blood pressure (!) 150/77, pulse (!) 105, temperature 98.7 °F (37.1 °C), resp. rate 20, height 5' 2\" (1.575 m), weight 90.6 kg (199 lb 11.2 oz), SpO2 96 %. General:    elderly, following some commands but not answering any question. CV:   RRR. No m/r/g. No jugular venous distension. No edema   Lungs:    Coarse   Abdomen: Bowel sounds present. Soft, nontender, nondistended. Extremities: No cyanosis or clubbing. No edema  Skin:     No rashes and normal coloration. Warm and dry. Neuro:  grossly intact.     Psych:  Altered    I have reviewed ordered lab tests and independently visualized imaging below:    Recent Labs:  Recent Results (from the past 48 hour(s))   GLUCOSE, POC    Collection Time: 01/17/22  5:05 PM   Result Value Ref Range    Glucose (POC) 152 (H) 65 - 100 mg/dL    Performed by Alfonso 263, POC    Collection Time: 01/17/22  9:01 PM   Result Value Ref Range    Glucose (POC) 163 (H) 65 - 100 mg/dL    Performed by WolfgangTrenton)BSN    GLUCOSE, POC    Collection Time: 01/18/22  2:17 AM   Result Value Ref Range Glucose (POC) 148 (H) 65 - 100 mg/dL    Performed by Eber Conklin, POC    Collection Time: 01/18/22  5:01 AM   Result Value Ref Range    Glucose (POC) 145 (H) 65 - 100 mg/dL    Performed by Ingrid    GLUCOSE, POC    Collection Time: 01/18/22 10:13 AM   Result Value Ref Range    Glucose (POC) 169 (H) 65 - 100 mg/dL    Performed by Jonatan    CBC W/O DIFF    Collection Time: 01/18/22 10:58 AM   Result Value Ref Range    WBC 11.7 (H) 4.3 - 11.1 K/uL    RBC 4.48 4.05 - 5.2 M/uL    HGB 12.2 11.7 - 15.4 g/dL    HCT 38.1 35.8 - 46.3 %    MCV 85.0 79.6 - 97.8 FL    MCH 27.2 26.1 - 32.9 PG    MCHC 32.0 31.4 - 35.0 g/dL    RDW 14.8 (H) 11.9 - 14.6 %    PLATELET 017 816 - 770 K/uL    MPV 9.7 9.4 - 12.3 FL    ABSOLUTE NRBC 0.00 0.0 - 0.2 K/uL   METABOLIC PANEL, BASIC    Collection Time: 01/18/22 10:58 AM   Result Value Ref Range    Sodium 143 136 - 145 mmol/L    Potassium 4.0 3.5 - 5.1 mmol/L    Chloride 110 (H) 98 - 107 mmol/L    CO2 21 21 - 32 mmol/L    Anion gap 12 7 - 16 mmol/L    Glucose 199 (H) 65 - 100 mg/dL    BUN 18 8 - 23 MG/DL    Creatinine 0.38 (L) 0.6 - 1.0 MG/DL    GFR est AA >60 >60 ml/min/1.73m2    GFR est non-AA >60 >60 ml/min/1.73m2    Calcium 8.8 8.3 - 10.4 MG/DL   GLUCOSE, POC    Collection Time: 01/18/22  2:28 PM   Result Value Ref Range    Glucose (POC) 161 (H) 65 - 100 mg/dL    Performed by Bessy(AS)    MAGNESIUM    Collection Time: 01/18/22  3:16 PM   Result Value Ref Range    Magnesium 1.8 1.8 - 2.4 mg/dL   PHOSPHORUS    Collection Time: 01/18/22  3:16 PM   Result Value Ref Range    Phosphorus 2.7 2.3 - 3.7 MG/DL   TRIGLYCERIDE    Collection Time: 01/18/22  3:16 PM   Result Value Ref Range    Triglyceride 197 (H) 35 - 150 MG/DL   GLUCOSE, POC    Collection Time: 01/18/22  5:39 PM   Result Value Ref Range    Glucose (POC) 128 (H) 65 - 100 mg/dL    Performed by Miranda De La Paz    GLUCOSE, POC    Collection Time: 01/18/22  8:42 PM   Result Value Ref Range    Glucose (POC) 167 (H) 65 - 100 mg/dL    Performed by AcorLCEpuramathnT    GLUCOSE, POC    Collection Time: 01/18/22 11:58 PM   Result Value Ref Range    Glucose (POC) 153 (H) 65 - 100 mg/dL    Performed by AcorLCJohnT    GLUCOSE, POC    Collection Time: 01/19/22  3:48 AM   Result Value Ref Range    Glucose (POC) 134 (H) 65 - 100 mg/dL    Performed by [facility]    CBC W/O DIFF    Collection Time: 01/19/22  4:07 AM   Result Value Ref Range    WBC 11.6 (H) 4.3 - 11.1 K/uL    RBC 4.42 4.05 - 5.2 M/uL    HGB 11.7 11.7 - 15.4 g/dL    HCT 38.4 35.8 - 46.3 %    MCV 86.9 79.6 - 97.8 FL    MCH 26.5 26.1 - 32.9 PG    MCHC 30.5 (L) 31.4 - 35.0 g/dL    RDW 14.7 (H) 11.9 - 14.6 %    PLATELET 957 376 - 842 K/uL    MPV 9.2 (L) 9.4 - 12.3 FL    ABSOLUTE NRBC 0.00 0.0 - 0.2 K/uL   METABOLIC PANEL, BASIC    Collection Time: 01/19/22  4:07 AM   Result Value Ref Range    Sodium 145 136 - 145 mmol/L    Potassium 3.5 3.5 - 5.1 mmol/L    Chloride 110 (H) 98 - 107 mmol/L    CO2 26 21 - 32 mmol/L    Anion gap 9 7 - 16 mmol/L    Glucose 187 (H) 65 - 100 mg/dL    BUN 21 8 - 23 MG/DL    Creatinine 0.42 (L) 0.6 - 1.0 MG/DL    GFR est AA >60 >60 ml/min/1.73m2    GFR est non-AA >60 >60 ml/min/1.73m2    Calcium 8.8 8.3 - 10.4 MG/DL   PHOSPHORUS    Collection Time: 01/19/22  4:07 AM   Result Value Ref Range    Phosphorus 3.1 2.3 - 3.7 MG/DL   MAGNESIUM    Collection Time: 01/19/22  4:07 AM   Result Value Ref Range    Magnesium 2.9 (H) 1.8 - 2.4 mg/dL   TRIGLYCERIDE    Collection Time: 01/19/22  4:07 AM   Result Value Ref Range    Triglyceride 167 (H) 35 - 150 MG/DL   GLUCOSE, POC    Collection Time: 01/19/22  7:50 AM   Result Value Ref Range    Glucose (POC) 179 (H) 65 - 100 mg/dL    Performed by [employer]visors[employer]    GLUCOSE, POC    Collection Time: 01/19/22 12:07 PM   Result Value Ref Range    Glucose (POC) 156 (H) 65 - 100 mg/dL    Performed by [employer]        All Micro Results     Procedure Component Value Units Date/Time    CULTURE, BLOOD [[id number]] Collected: 01/12/22 0229    Order Status: Completed Specimen: Blood Updated: 01/17/22 0801     Special Requests: --        RIGHT  Antecubital       Culture result: NO GROWTH 5 DAYS       CULTURE, BLOOD [[id number]] Collected: 01/12/22 1249    Order Status: Completed Specimen: Blood Updated: 01/17/22 0801     Special Requests: --        RIGHT  FOREARM       Culture result: NO GROWTH 5 DAYS       SARS-COV-2, PCR [[id number]]  (Abnormal) Collected: 01/16/22 0808    Order Status: Completed Specimen: Nasopharyngeal Updated: 01/16/22 1645     Specimen source Nasopharyngeal        SARS-CoV-2 Detected        Comment:      The specimen is POSITIVE for SARS-CoV-2, the novel coronavirus associated with COVID-19. This test has been authorized by the FDA under an Emergency Use Authorization (EUA) for use by authorized laboratories.         Fact sheet for Healthcare Providers: ConventionUpdate.co.nz  Fact sheet for Patients: https://fda.gov/media/422905/download       Methodology: RT-PCR  RESULTS VERIFIED, PHONED TO AND READ BACK BY  TAMRA Wade RN ON 01/16/22 @1645, ADS         FUNGUS CULTURE AND SMEAR [[id number]] Collected: 01/15/22 1608    Order Status: Canceled Specimen: Other     FUNGUS CULTURE AND SMEAR [[id number]] Collected: 01/15/22 1517    Order Status: Canceled Specimen: Other     CULTURE, URINE [[id number]]  (Abnormal)  (Susceptibility) Collected: 01/12/22 0227    Order Status: Completed Specimen: Urine from Clean catch Updated: 01/15/22 0733     Special Requests: NO SPECIAL REQUESTS        Culture result:       >100,000 COLONIES/mL ESCHERICHIA COLI                  50,000-100,000 COLONIES/mL NORMAL SKIN SNOW ISOLATED          COVID-19 RAPID TEST [[id number]] Collected: 01/12/22 2900    Order Status: Completed Specimen: Nasopharyngeal Updated: 01/12/22 0320     Specimen source NASAL        COVID-19 rapid test Not detected        Comment:      The specimen is NEGATIVE for SARS-CoV-2, the novel coronavirus associated with COVID-19. A negative result does not rule out COVID-19. This test has been authorized by the FDA under an Emergency Use Authorization (EUA) for use by authorized laboratories. Fact sheet for Healthcare Providers: kstattoo.com  Fact sheet for Patients: kstattoo.com       Methodology: Isothermal Nucleic Acid Amplification               Other Studies:  No results found.     Current Meds:  Current Facility-Administered Medications   Medication Dose Route Frequency    TPN ADULT - dextrose 5% amino acid 4.25%   IntraVENous QPM    TPN ADULT - dextrose 5% amino acid 4.25%   IntraVENous QPM    0.9% sodium chloride infusion  50 mL/hr IntraVENous CONTINUOUS    insulin glargine (LANTUS) injection 20 Units  20 Units SubCUTAneous DAILY    insulin lispro (HUMALOG) injection   SubCUTAneous Q4H    dexamethasone (DECADRON) 10 mg/mL injection 6 mg  6 mg IntraVENous Q24H    alcohol 62% (NOZIN) nasal  1 Ampule  1 Ampule Topical Q12H    cefTRIAXone (ROCEPHIN) 1 g in 0.9% sodium chloride (MBP/ADV) 50 mL MBP  1 g IntraVENous Q24H    NUTRITIONAL SUPPORT ELECTROLYTE PRN ORDERS   Does Not Apply PRN    pantoprazole (PROTONIX) 40 mg in 0.9% sodium chloride 10 mL injection  40 mg IntraVENous BID    albuterol (PROVENTIL VENTOLIN) nebulizer solution 2.5 mg  2.5 mg Nebulization Q4H PRN    nystatin (MYCOSTATIN) 100,000 unit/gram powder   Topical BID    morphine injection 2 mg  2 mg IntraVENous Q4H PRN    sodium chloride (NS) flush 5-40 mL  5-40 mL IntraVENous Q8H    sodium chloride (NS) flush 5-40 mL  5-40 mL IntraVENous PRN    acetaminophen (TYLENOL) tablet 650 mg  650 mg Oral Q6H PRN    Or    acetaminophen (TYLENOL) suppository 650 mg  650 mg Rectal Q6H PRN    polyethylene glycol (MIRALAX) packet 17 g  17 g Oral DAILY PRN    ondansetron (ZOFRAN ODT) tablet 4 mg  4 mg Oral Q8H PRN    Or    ondansetron [facility]) injection 4 mg  4 mg IntraVENous Q6H PRN    enoxaparin (LOVENOX) injection 40 mg  40 mg SubCUTAneous DAILY    magnesium hydroxide (MILK OF MAGNESIA) 400 mg/5 mL oral suspension 30 mL  30 mL Oral DAILY PRN    alum-mag hydroxide-simeth (MYLANTA) oral suspension 15 mL  15 mL Oral Q6H PRN    amitriptyline (ELAVIL) tablet 25 mg  25 mg Oral QHS PRN    atorvastatin (LIPITOR) tablet 20 mg  20 mg Oral DAILY    aspirin delayed-release tablet 81 mg  81 mg Oral DAILY    budesonide-formoterol (SYMBICORT) 80-4.5 mcg inhaler  2 Puff Inhalation BID RT    sertraline (ZOLOFT) tablet 100 mg  100 mg Oral DAILY    HYDROcodone-acetaminophen (NORCO) 5-325 mg per tablet 1 Tablet  1 Tablet Oral Q8H PRN    docusate sodium (COLACE) capsule 100 mg  100 mg Oral BID       Signed:  Perry Sneed MD    Part of this note may have been written by using a voice dictation software. The note has been proof read but may still contain some grammatical/other typographical errors.

## 2022-01-19 NOTE — PROGRESS NOTES
Problem: Pressure Injury - Risk of  Goal: *Prevention of pressure injury  Description: Document Jaime Scale and appropriate interventions in the flowsheet. Outcome: Progressing Towards Goal  Note: Pressure Injury Interventions:  Sensory Interventions: Assess changes in LOC,Assess need for specialty bed,Check visual cues for pain,Float heels,Minimize linen layers,Monitor skin under medical devices,Pressure redistribution bed/mattress (bed type),Turn and reposition approx. every two hours (pillows and wedges if needed)    Moisture Interventions: Absorbent underpads,Apply protective barrier, creams and emollients,Check for incontinence Q2 hours and as needed,Internal/External urinary devices,Maintain skin hydration (lotion/cream),Minimize layers    Activity Interventions: Assess need for specialty bed,Pressure redistribution bed/mattress(bed type)    Mobility Interventions: Assess need for specialty bed,Pressure redistribution bed/mattress (bed type),Turn and reposition approx.  every two hours(pillow and wedges)    Nutrition Interventions: Discuss nutritional consult with provider    Friction and Shear Interventions: Apply protective barrier, creams and emollients,Foam dressings/transparent film/skin sealants,Minimize layers,Lift sheet,HOB 30 degrees or less

## 2022-01-19 NOTE — PROGRESS NOTES
Comprehensive Nutrition Assessment    Type and Reason for Visit: Reassess  Tube Feeding Management (Hospitalist)  TPN Management (Hospitalist)    Nutrition Recommendations/Plan:   · Parenteral Nutrition:  · Total parenteral nutrition  to begin at 1800  · Initiate: Dex 5%, 4.25% AA 1.56 L (65ml/hr)   · Hold 250 ml 20% lipids d/t initiation of trophic TF ; if TF not tolerated lipids would be beneficial as triglyceride lab WNL  · To provide: 530 kcal/d (44% of needs), 66 grams of protein/d (83% of needs), 78 grams of CHO/d and 1560 ml of total volume/d. · Lytes/L:   · Sodium 20 meq (20 meq NaCl), Potassium 20 meq (20 meq KPO4), omit Mg, 4.5 meq Calcium  · Other additives: MTE, MVI MWF, 100mg thiamine (day 2/7 d/t risk of refeeding)  · IVF:  · Discontinue at 1800  · Nutritional Supplement Therapy:   · Implement electrolyte replacement per nutrition support protocols  · Replacement indicated:  · None  · Labs:   · BMP daily  · Mg MWF  · Phos MWF    · POC Glucoses/SSI Active  Enteral Nutrition: Once NGT placed and verified per KUB  Trophic Enteral Feeds do not advance:  Initiate Glucerna 1.5 (Diabetic) via pending NGT at 10ml/hour. Water flush 10ml/hr. Trophic enteral infusion is not intended to meet nutrient needs. Malnutrition Assessment:  Malnutrition Status: At risk for malnutrition (specify) (NPO since 1/14, advanced age, unknown intake PTA)   Nutrition Assessment:   Nutrition History: Unable to assess 2/2 clinical condition. Per EMR review, pt preview pt previously able to tolerate regular diet during admission however NPO since 1/14 d/t SLP evaluation oropharyngeal dysphagia characterized by impaired oral acceptance, prolonged oral holding, and incoordinated swallow which resulted in overt s/sx of airway compromise with thin liquids. Per EMR review, no recent wt loss.       Nutrition Background: Pt with PMH significant for  h/o HTN, DM, recent fall while on cruise resulting in humerus fracture, now with another fall at home, found to have pneumonia with hypoxia and UTI. + COVID-19 1/16 during admission. Transferred to ICU 1/16 d/t decompensation and increased O2 demands. Daily Update:  Discussed pt with Clarissa España RN. RN reports pt tolerating TPN, now on NC. RN states pt would most likely tolerate NGT placement, however questionable bleeding from nose and mouth. Discussed with MD, orders for NGT placed. Will continue TPN infusion until NGT placed and TF tolerance established. Abdominal Status (last documented): Obese,Soft abdomen with Active  bowel sounds. Last BM 01/09/22. Pertinent Medications: decadron, SSI (9 units given 1/18, 9 units given thus far today), nystatin, protonix BID, miralax PRN, carafate (noted none received thus far in admission, will not account for in TF order)  Drips: NS @ 50ml/hr   Pertinent Labs:  Lab Results   Component Value Date/Time    Sodium 145 01/19/2022 04:07 AM    Potassium 3.5 01/19/2022 04:07 AM    Chloride 110 (H) 01/19/2022 04:07 AM    CO2 26 01/19/2022 04:07 AM    Anion gap 9 01/19/2022 04:07 AM    Glucose 187 (H) 01/19/2022 04:07 AM    BUN 21 01/19/2022 04:07 AM    Creatinine 0.42 (L) 01/19/2022 04:07 AM    Calcium 8.8 01/19/2022 04:07 AM    Albumin 2.9 (L) 01/12/2022 02:29 AM    Magnesium 2.9 (H) 01/19/2022 04:07 AM    Phosphorus 3.1 01/19/2022 04:07 AM     Lab Results   Component Value Date/Time    Glucose 187 (H) 01/19/2022 04:07 AM    Glucose (POC) 156 (H) 01/19/2022 12:07 PM    Glucose (POC) 179 (H) 01/19/2022 07:50 AM    Glucose (POC) 134 (H) 01/19/2022 03:48 AM    Glucose (POC) 153 (H) 01/18/2022 11:58 PM    Glucose (POC) 167 (H) 01/18/2022 08:42 PM    Glucose (POC) 128 (H) 01/18/2022 05:39 PM   Labs reviewed and remarkable slightly elevated Mg and POC glucose ranging from 128-187mg/dL. Will remove Mg in TPN tonight, increase K, and decrease Na. Trophic TF to begin, therefore not increasing volume of TPN.     Nutrition Related Findings:   NFPE deferred d/t isolation precautions. No visisble wasting noted through window. Pt on NC during assessment, previous need for CPAP/BiPAP. Remains NPO. TPN to start 1/18. TPN started 1/18, transferred to floor and now on NC. NGT to attempt to be placed. TPN to continue until NGT placed and TF tolerance established. Current Nutrition Therapies:  DIET NPO  Current Parenteral Nutrition Orders:  · Type and Formula: 5%DEX/4.25%AA   · Lipids: None  · Duration: Continuous  · Rate/Volume: 65ml/hr  · Current PN Order Provides: 530 kcal/d (44% of needs), 66 grams of protein/d (83% of needs), 78 grams of CHO/d and 1560 ml of total volume/d. Current Intake:   Average Meal Intake: NPO        Anthropometric Measures:  Height: 5' 2\" (157.5 cm)  Current Body Wt: 90.6 kg (199 lb 11.8 oz) (1/19), Weight source: Not specified  BMI: 36.5, Obese class 2 (BMI 35.0-39. 9)     Ideal Body Weight (lbs) (Calculated): 110 lbs (50 kg), 200 %  Usual Body Wt: 95.7 kg (211 lb) (per MD encounter 12/11/2021), Percent weight change: 4.3          Edema: Generalized: Trace (1/19/2022  8:02 AM)  RUE: Non-pitting (hand) (1/18/2022  7:30 PM)     Estimated Daily Nutrient Needs:  Energy (kcal/day): 6505-1369 (Kcal/kg (15-20), Weight Used: Current (90.6kg))  Protein (g/day): 68-82 (20% estimated kcal needs) Weight Used: (Current (90.6kg))  Fluid (ml/day):   (1 ml/kcal)    Nutrition Diagnosis:   · Inadequate oral intake related to swallowing difficulty,impaired respiratory function as evidenced by NPO or clear liquid status due to medical condition,nutrition support-parenteral nutrition (desatting with sips of liquid, dysphagia)       Nutrition Interventions:   Food and/or Nutrient Delivery: Continue NPO,Start tube feeding,Modify parenteral nutrition     Coordination of Nutrition Care: Continue to monitor while inpatient  Plan of Care discussed with Baldo Watson RN and Dr. Mat Civatte via [employer]     Goals:   Previous Goal Met: Progressing toward goal(s)  Active Goal: Tolerate PN + EN until EN tolerance established    Nutrition Monitoring and Evaluation:      Food/Nutrient Intake Outcomes: Diet advancement/tolerance,Enteral nutrition intake/tolerance,Parenteral nutrition intake/tolerance  Physical Signs/Symptoms Outcomes: Biochemical data,Chewing or swallowing,GI status,Hemodynamic status,Fluid status or edema,Weight    Discharge Planning:     Too soon to determine    [facility]) GAEL Rocha, LD  Contact: [phone]      Disaster Mode Active

## 2022-01-20 NOTE — PROGRESS NOTES
No acute events overnight. Patient resting quietly in bed. Respirations present, even and unlabored on CPAP 30%. Dobhoff noted to patient's left nare. Glucerna infusing at 10mL/hr with a 10mL water flush q1 hr. NS infusing at 50mL/hr. TPN infusing at 65mL/hr. Nair in place, patent and draining clear yellow urine. Bed low and locked, safety measures in place. No signs of distress, no needs expressed. Call light within reach. Preparing to give report to oncoming RN.

## 2022-01-20 NOTE — PROGRESS NOTES
Report received from [employer]. Patient resting quietly in bed. Respirations present, even and unlabored on 3L NC. Dobhoff noted to patient's left nare. Glucerna infusing at 10mL/hr with a 10mL water flush q1 hr. NS infusing at 50mL/hr. TPN infusing at 65mL/hr. Nair in place, patent and draining clear yellow urine. Bed low and locked, safety measures in place. No signs of distress, no needs expressed. Call light within reach. Will continue to monitor.

## 2022-01-20 NOTE — PROGRESS NOTES
Noted picking around the face and around NGT/O2 canula. Oral care done. Unsuccessful attemtpt to educate pt not to pull on tubes. Hand mitt to right arm applied. Limited movement with left arm with sling in place. Spoke with Dr. Dariana Lea. New orders for restraints with mitts received. No residual from TF. TF increased by 15cc/hr. New rate 25cc/hr with water flush es 120cc/q4h. Will monitor closely.

## 2022-01-20 NOTE — PROGRESS NOTES
Patient's Quad lumen no longer draws back blood in any of the four lumen. MD made aware of the above, new order obtained for Alteplase intra-catheter. Will continue to monitor.

## 2022-01-20 NOTE — PROGRESS NOTES
01/19/22 2332   Oxygen Therapy   O2 Sat (%) 97 %   Pulse via Oximetry 108 beats per minute   O2 Device BIPAP   FIO2 (%) 30 %   Respiratory   Respiratory (WDL) X   Respiratory Pattern Regular   Chest/Tracheal Assessment Chest expansion, symmetrical   CPAP/BIPAP   CPAP/BIPAP Start/Stop On   Device Mode BIPAP   $$ Bipap Daily Yes   Mask Type and Size Full face   Skin Condition intact   PIP Observed 14 cm H20   IPAP (cm H2O) 14 cm H2O   EPAP (cm H2O) 8 cm H2O   Inspiratory Time (sec) 0.9 seconds   Vt Spont (ml) 413 ml   Ve Observed (l/min) 7.1 l/min   Backup Rate 16   Total RR (Spontaneous) 21 breaths per minute   Insp Rise Time (sec) 3   Leak (Estimated) 17 L/min   Pt's Home Machine No   Biomedical Check Performed Yes   Settings Verified Yes   Alarm Settings   High Pressure 35   Low Pressure 5   Apnea 20   Low Ve 3   High Rate 50   Low Rate 8   Pulmonary Toilet   Pulmonary Toilet Cough and deep breath;H. O.B elevated

## 2022-01-20 NOTE — PROGRESS NOTES
Hospitalist Progress Note   Admit Date:  2022 10:04 PM   Name:  Deo Blevins   Age:  57 y.o. Sex:  female  :  1960   MRN:  [id number]   Room:  [facility]/    Presenting Complaint: Fall    Reason(s) for Admission: Pneumonia [J18.9]     Hospital Course & Interval History:      Ms. Danay Fan is a 56 yo female with PMH of asthma, DM2, CVA, obesity, neuropathy, JESSICA on CPAP admitted s/p fall with recent left humeral fracture that occurred while on [employer]. Ortho consulted and and recommends CT left shoulder and outpatient followup with Dr. Heriberto Law. Patient also found hypoxic and met sepsis criteria due to fever / leukocytosis. Sepsis work-up showed LLL pneumonia and UTI. COVID negative. She was started on empiric antibiotic.       Patient was found to be in diabetic ketoacidosis on  and started on glucose stabilizer with improvement. Patient placed back on insulin gtt. and bicarb for recurrent DKA on . Patient initially required BiPAP and then transitioned to nasal cannula. Patient started spiking high-grade fevers on .  notified family member positive for COVID. Patient was rechecked for COVID and came positive.      CT head was done for concern for confusion and showed no strokes but there is a concern for a fungal right maxillary sinus infection. She was started on fluconazole. Infectious disease consulted and recommend no invasion of bone on imaging and no finding on skin, eye or palate to suggest Mucor. She could possibly have an Aspergillus chronic sinusitis, and recommend to discontinue fluconazole and continue to treat E. coli UTI. Fungitell and Aspergillus antigen from serum ordered.     Patient had an episode of coffee-ground emesis on 1/15. GI consulted and recommend continue PPI. Subjective (22): Patient was seen and examined at the bedside. Patient's only concern was that she was thirsty and wanted something to drink.   Patient currently n.p.o. Patient denied any cardiac chest pain, abdominal pain, fevers or chills.     Assessment & Plan:   Diabetic ketoacidosisdiabetes mellitus   1/14 hemoglobin A1c of 7   Patient in DKA on 1/13, started on glucose stabilizer, bicarb drip and insulin drip with correction   Patient had to be placed back on insulin drip and bicarb for recurrent DKA on 1/16   Anion gap closed and acidosis resolved on 1/17 and patient was transitioned to subcu insulin   Continue Lantus 20 units daily and sliding scale insulin    Acute respiratory failure with hypoxia; sepsis; COVID-19 pneumonia   Patient found to be positive on 1/16 of COVID-pneumonia   Currently off BiPAP   Continue supplemental oxygen to Maintain oxygen saturation greater than 90%   Continue CPAP at night   Continue Decadron   Patient currently on 3 L at 98%    Encephalopathy   Metabolic versus COVID induced   Metabolic acidosis resolved   Continue to treat COVID as above   Frequent reorientation, delirium precautions   Avoid sedatives   PT/OT    Right maxillary sinus fungal infection   CT head showed no strokes but concern for fungal right maxillary sinus infection   Started on fluconazole   Infectious disease consulted recommended no invasion of bone on imaging and no findings on skin or palate to suggest Mucor   Aspergillus chronic sinusitis, ID recommended to discontinue fluconazole continue to treat E. coli UTI   Fungitell and Aspergillus antigen from serum ordered, pending    Urinary tract infection   Urine culture positive for E. coli  Status post Rocephin end of treatment 1/19    Oropharyngeal dysphagia   1/18 patient has failed speech eval   Continue n.p.o.   Started patient on TPN   Nutrition consulted   Speech therapy following    Closed fracture of surgical neck of humerus   Outpatient Ortho   Supportive care   PT/OT    Prior CVA   Continue aspirin, Lipitor    Coffee-ground emesis   GI recommending Protonix      Dispo/Discharge Planning:    Dispo pending clinical course. Continue strict n.p.o. Diet:  DIET NPO  ADULT TUBE FEEDING Nasogastric; Diabetic; Delivery Method: Continuous; Continuous Initial Rate (mL/hr): 10; Continuous Advance Tube Feeding: Yes; Advancement Volume (mL/hr): 15; Advancement Frequency: Q 8 hours; Continuous Goal Rate (mL/hr): 45; W...   DVT PPx: Lovenox  Code status: Full Code    Hospital Problems as of 1/20/2022 Never Reviewed          Codes Class Noted - Resolved POA    Hematemesis with nausea ICD-10-CM: K92.0  ICD-9-CM: 578.0, 787.02  1/15/2022 - Present No        Hypernatremia ICD-10-CM: E87.0  ICD-9-CM: 276.0  1/15/2022 - Present No        Encephalopathy ICD-10-CM: G93.40  ICD-9-CM: 348.30  1/15/2022 - Present Unknown        Metabolic acidosis SIS-67-PX: E87.2  ICD-9-CM: 276.2  1/13/2022 - Present No        Pneumonia ICD-10-CM: J18.9  ICD-9-CM: 025  1/12/2022 - Present Yes        UTI (urinary tract infection) ICD-10-CM: N39.0  ICD-9-CM: 599.0  1/12/2022 - Present Yes        Hypoxia ICD-10-CM: R09.02  ICD-9-CM: 799.02  1/12/2022 - Present Yes        Obesity (Chronic) ICD-10-CM: E66.9  ICD-9-CM: 278.00  1/12/2022 - Present Yes        Closed fracture of surgical neck of humerus ICD-10-CM: S42.213A  ICD-9-CM: 812.01  1/12/2022 - Present Yes        Acute respiratory failure with hypoxia [facility]) ICD-10-CM: J96.01  ICD-9-CM: 518.81  1/12/2022 - Present Yes        * (Principal) Sepsis ([facility] [address] ICD-10-CM: A41.9  ICD-9-CM: 038.9, 995.91  1/12/2022 - Present Yes              Objective:     Patient Vitals for the past 24 hrs:   Temp Pulse Resp BP SpO2   01/20/22 1438 97.7 °F (36.5 °C) (!) 106 17 139/71 97 %   01/20/22 1117 97.8 °F (36.6 °C) (!) 109 21 132/79 98 %   01/20/22 0902     97 %   01/20/22 0723 98.1 °F (36.7 °C) (!) 105 22 (!) 145/79 98 %   01/20/22 0302 98.1 °F (36.7 °C) (!) 103 24 125/82 98 %   01/19/22 2332     97 %   01/19/22 2215 98.4 °F (36.9 °C) (!) 113 18 (!) 150/85 98 %   01/19/22 2056     96 % 01/19/22 1928 98.8 °F (37.1 °C) (!) 115 18 139/80 95 %   01/19/22 1600  (!) 112        Oxygen Therapy  O2 Sat (%): 97 % (01/20/22 1438)  Pulse via Oximetry: 104 beats per minute (01/20/22 0902)  O2 Device: Nasal cannula (01/20/22 0902)  Skin Assessment: Clean, dry, & intact (01/19/22 1928)  Skin Protection for O2 Device: No (01/19/22 1928)  Orientation: Bilateral (01/16/22 0745)  Location: Cheek (01/15/22 1900)  Interventions: Mouth Care (01/18/22 0703)  O2 Flow Rate (L/min): 3 l/min (01/20/22 0902)  FIO2 (%): 30 % (01/20/22 0302)    Estimated body mass index is 36.31 kg/m² as calculated from the following:    Height as of this encounter: 5' 2\" (1.575 m). Weight as of this encounter: 90 kg (198 lb 8 oz). Intake/Output Summary (Last 24 hours) at 1/20/2022 1539  Last data filed at 1/20/2022 0902  Gross per 24 hour   Intake 1280.21 ml   Output 850 ml   Net 430.21 ml         Physical Exam:   Blood pressure 139/71, pulse (!) 106, temperature 97.7 °F (36.5 °C), resp. rate 17, height 5' 2\" (1.575 m), weight 90 kg (198 lb 8 oz), SpO2 97 %. General:    Well nourished. No overt distress  Head:  Normocephalic, atraumatic  Eyes:  Sclerae appear normal.  Pupils equally round. ENT:  Nares appear normal, no drainage. Moist oral mucosa  Neck:  No restricted ROM. Trachea midline   CV:   RRR. No m/r/g. No jugular venous distension. Lungs:   Decreased breath sounds bilaterally. No wheezing, rhonchi, or rales. Respirations even, unlabored  Abdomen: Bowel sounds present. Soft, nontender, nondistended. Extremities: No cyanosis or clubbing. No edema  Skin:     No rashes and normal coloration. Warm and dry. Neuro:  CN II-XII grossly intact. Sensation intact. A&Ox3  Psych:  Normal mood and affect.       I have reviewed ordered lab tests and independently visualized imaging below:    Recent Labs:  Recent Results (from the past 48 hour(s))   GLUCOSE, POC    Collection Time: 01/18/22  5:39 PM   Result Value Ref Range    Glucose (POC) 128 (H) 65 - 100 mg/dL    Performed by Binu Duran    GLUCOSE, POC    Collection Time: 01/18/22  8:42 PM   Result Value Ref Range    Glucose (POC) 167 (H) 65 - 100 mg/dL    Performed by GinoTHIAGO    GLUCOSE, POC    Collection Time: 01/18/22 11:58 PM   Result Value Ref Range    Glucose (POC) 153 (H) 65 - 100 mg/dL    Performed by AcorLCRobinT    GLUCOSE, POC    Collection Time: 01/19/22  3:48 AM   Result Value Ref Range    Glucose (POC) 134 (H) 65 - 100 mg/dL    Performed by [facility]    CBC W/O DIFF    Collection Time: 01/19/22  4:07 AM   Result Value Ref Range    WBC 11.6 (H) 4.3 - 11.1 K/uL    RBC 4.42 4.05 - 5.2 M/uL    HGB 11.7 11.7 - 15.4 g/dL    HCT 38.4 35.8 - 46.3 %    MCV 86.9 79.6 - 97.8 FL    MCH 26.5 26.1 - 32.9 PG    MCHC 30.5 (L) 31.4 - 35.0 g/dL    RDW 14.7 (H) 11.9 - 14.6 %    PLATELET 805 761 - 064 K/uL    MPV 9.2 (L) 9.4 - 12.3 FL    ABSOLUTE NRBC 0.00 0.0 - 0.2 K/uL   METABOLIC PANEL, BASIC    Collection Time: 01/19/22  4:07 AM   Result Value Ref Range    Sodium 145 136 - 145 mmol/L    Potassium 3.5 3.5 - 5.1 mmol/L    Chloride 110 (H) 98 - 107 mmol/L    CO2 26 21 - 32 mmol/L    Anion gap 9 7 - 16 mmol/L    Glucose 187 (H) 65 - 100 mg/dL    BUN 21 8 - 23 MG/DL    Creatinine 0.42 (L) 0.6 - 1.0 MG/DL    GFR est AA >60 >60 ml/min/1.73m2    GFR est non-AA >60 >60 ml/min/1.73m2    Calcium 8.8 8.3 - 10.4 MG/DL   PHOSPHORUS    Collection Time: 01/19/22  4:07 AM   Result Value Ref Range    Phosphorus 3.1 2.3 - 3.7 MG/DL   MAGNESIUM    Collection Time: 01/19/22  4:07 AM   Result Value Ref Range    Magnesium 2.9 (H) 1.8 - 2.4 mg/dL   TRIGLYCERIDE    Collection Time: 01/19/22  4:07 AM   Result Value Ref Range    Triglyceride 167 (H) 35 - 150 MG/DL   GLUCOSE, POC    Collection Time: 01/19/22  7:50 AM   Result Value Ref Range    Glucose (POC) 179 (H) 65 - 100 mg/dL    Performed by Scar    GLUCOSE, POC    Collection Time: 01/19/22 12:07 PM   Result Value Ref Range    Glucose (POC) 156 (H) 65 - 100 mg/dL    Performed by CatglobeA    GLUCOSE, POC    Collection Time: 01/19/22  4:16 PM   Result Value Ref Range    Glucose (POC) 165 (H) 65 - 100 mg/dL    Performed by CatglobeA    GLUCOSE, POC    Collection Time: 01/19/22  8:32 PM   Result Value Ref Range    Glucose (POC) 162 (H) 65 - 100 mg/dL    Performed by AcorGeckoLifePCT    GLUCOSE, POC    Collection Time: 01/20/22 12:07 AM   Result Value Ref Range    Glucose (POC) 154 (H) 65 - 100 mg/dL    Performed by AcorLCVelocompPCT    GLUCOSE, POC    Collection Time: 01/20/22  4:01 AM   Result Value Ref Range    Glucose (POC) 193 (H) 65 - 100 mg/dL    Performed by AcChromasunT    CBC W/O DIFF    Collection Time: 01/20/22  4:28 AM   Result Value Ref Range    WBC 11.8 (H) 4.3 - 11.1 K/uL    RBC 4.88 4.05 - 5.2 M/uL    HGB 13.3 11.7 - 15.4 g/dL    HCT 42.8 35.8 - 46.3 %    MCV 87.7 79.6 - 97.8 FL    MCH 27.3 26.1 - 32.9 PG    MCHC 31.1 (L) 31.4 - 35.0 g/dL    RDW 14.5 11.9 - 14.6 %    PLATELET 105 081 - 137 K/uL    MPV 9.4 9.4 - 12.3 FL    ABSOLUTE NRBC 0.00 0.0 - 0.2 K/uL   METABOLIC PANEL, BASIC    Collection Time: 01/20/22  4:28 AM   Result Value Ref Range    Sodium 143 136 - 145 mmol/L    Potassium 3.7 3.5 - 5.1 mmol/L    Chloride 109 (H) 98 - 107 mmol/L    CO2 25 21 - 32 mmol/L    Anion gap 9 7 - 16 mmol/L    Glucose 195 (H) 65 - 100 mg/dL    BUN 26 (H) 8 - 23 MG/DL    Creatinine 0.31 (L) 0.6 - 1.0 MG/DL    GFR est AA >60 >60 ml/min/1.73m2    GFR est non-AA >60 >60 ml/min/1.73m2    Calcium 8.8 8.3 - 10.4 MG/DL   GLUCOSE, POC    Collection Time: 01/20/22  7:35 AM   Result Value Ref Range    Glucose (POC) 173 (H) 65 - 100 mg/dL    Performed by [facility]    GLUCOSE, POC    Collection Time: 01/20/22 11:31 AM   Result Value Ref Range    Glucose (POC) 227 (H) 65 - 100 mg/dL    Performed by [facility]        All Micro Results     Procedure Component Value Units Date/Time    CULTURE, BLOOD [[id number]] Collected: 01/12/22 0229    Order Status: Completed Specimen: Blood Updated: 01/17/22 0801     Special Requests: --        RIGHT  Antecubital       Culture result: NO GROWTH 5 DAYS       CULTURE, BLOOD [[id number]] Collected: 01/12/22 1249    Order Status: Completed Specimen: Blood Updated: 01/17/22 0801     Special Requests: --        RIGHT  FOREARM       Culture result: NO GROWTH 5 DAYS       SARS-COV-2, PCR [[id number]]  (Abnormal) Collected: 01/16/22 0808    Order Status: Completed Specimen: Nasopharyngeal Updated: 01/16/22 1645     Specimen source Nasopharyngeal        SARS-CoV-2 Detected        Comment:      The specimen is POSITIVE for SARS-CoV-2, the novel coronavirus associated with COVID-19. This test has been authorized by the FDA under an Emergency Use Authorization (EUA) for use by authorized laboratories.         Fact sheet for Healthcare Providers: ConventionUpdate.co.nz  Fact sheet for Patients: https://fda.gov/media/679224/download       Methodology: RT-PCR  RESULTS VERIFIED, PHONED TO AND READ BACK BY  TAMRA Wade RN ON 01/16/22 @1645, ADS         FUNGUS CULTURE AND SMEAR [[id number]] Collected: 01/15/22 1608    Order Status: Canceled Specimen: Other     FUNGUS CULTURE AND SMEAR [[id number]] Collected: 01/15/22 1517    Order Status: Canceled Specimen: Other     CULTURE, URINE [[id number]]  (Abnormal)  (Susceptibility) Collected: 01/12/22 0227    Order Status: Completed Specimen: Urine from Clean catch Updated: 01/15/22 0775     Special Requests: NO SPECIAL REQUESTS        Culture result:       >100,000 COLONIES/mL ESCHERICHIA COLI                  50,000-100,000 COLONIES/mL NORMAL SKIN SNOW ISOLATED          COVID-19 RAPID TEST [[id number]] Collected: 01/12/22 1089    Order Status: Completed Specimen: Nasopharyngeal Updated: 01/12/22 0320     Specimen source NASAL        COVID-19 rapid test Not detected        Comment:      The specimen is NEGATIVE for SARS-CoV-2, the novel coronavirus associated with COVID-19. A negative result does not rule out COVID-19. This test has been authorized by the FDA under an Emergency Use Authorization (EUA) for use by authorized laboratories. Fact sheet for Healthcare Providers: ConventionUpdate.co.nz  Fact sheet for Patients: ConventionUpdate.co.nz       Methodology: Isothermal Nucleic Acid Amplification               Other Studies:  No results found.     Current Meds:  Current Facility-Administered Medications   Medication Dose Route Frequency    alteplase (CATHFLO) injection 1 mg  1 mg InterCATHeter ONCE    [START ON 1/21/2022] polyethylene glycol (MIRALAX) packet 17 g  17 g Per NG tube DAILY    dexAMETHasone (DECADRON) tablet 6 mg  6 mg Per NG tube DAILY    TPN ADULT - dextrose 5% amino acid 4.25%   IntraVENous QPM    lip protectant (BLISTEX) ointment 1 Each  1 Each Topical PRN    insulin glargine (LANTUS) injection 20 Units  20 Units SubCUTAneous DAILY    insulin lispro (HUMALOG) injection   SubCUTAneous Q4H    alcohol 62% (NOZIN) nasal  1 Ampule  1 Ampule Topical Q12H    NUTRITIONAL SUPPORT ELECTROLYTE PRN ORDERS   Does Not Apply PRN    pantoprazole (PROTONIX) 40 mg in 0.9% sodium chloride 10 mL injection  40 mg IntraVENous BID    albuterol (PROVENTIL VENTOLIN) nebulizer solution 2.5 mg  2.5 mg Nebulization Q4H PRN    nystatin (MYCOSTATIN) 100,000 unit/gram powder   Topical BID    morphine injection 2 mg  2 mg IntraVENous Q4H PRN    sodium chloride (NS) flush 5-40 mL  5-40 mL IntraVENous Q8H    sodium chloride (NS) flush 5-40 mL  5-40 mL IntraVENous PRN    acetaminophen (TYLENOL) tablet 650 mg  650 mg Oral Q6H PRN    Or    acetaminophen (TYLENOL) suppository 650 mg  650 mg Rectal Q6H PRN    ondansetron (ZOFRAN ODT) tablet 4 mg  4 mg Oral Q8H PRN    Or    ondansetron (ZOFRAN) injection 4 mg  4 mg IntraVENous Q6H PRN    enoxaparin (LOVENOX) injection 40 mg  40 mg SubCUTAneous DAILY    magnesium hydroxide (MILK OF MAGNESIA) 400 mg/5 mL oral suspension 30 mL  30 mL Oral DAILY PRN    alum-mag hydroxide-simeth (MYLANTA) oral suspension 15 mL  15 mL Oral Q6H PRN    amitriptyline (ELAVIL) tablet 25 mg  25 mg Oral QHS PRN    atorvastatin (LIPITOR) tablet 20 mg  20 mg Oral DAILY    aspirin delayed-release tablet 81 mg  81 mg Oral DAILY    budesonide-formoterol (SYMBICORT) 80-4.5 mcg inhaler  2 Puff Inhalation BID RT    sertraline (ZOLOFT) tablet 100 mg  100 mg Oral DAILY    HYDROcodone-acetaminophen (NORCO) 5-325 mg per tablet 1 Tablet  1 Tablet Oral Q8H PRN    docusate sodium (COLACE) capsule 100 mg  100 mg Oral BID       Signed:  Brandon Mendosa MD    Part of this note may have been written by using a voice dictation software. The note has been proof read but may still contain some grammatical/other typographical errors.

## 2022-01-20 NOTE — PROGRESS NOTES
Comprehensive Nutrition Assessment    Type and Reason for Visit: Reassess  Tube Feeding Management (Hospitalist)  TPN Management (Hospitalist)    Nutrition Recommendations/Plan:    Parenteral Nutrition:   Allow current TPN to continue infusing until 1800. Will not reorder TPN dosage for tonight as pt tolerating trophic TF. Enteral Nutrition:   Enteral Access: Nasogastric  Formula: Diabetic (Glucerna 1.5 Dontrell)  Delivery: Continuous feeding: Continue  10ml/hour, progress by 15ml/8 hours to goal rate of 45ml/hour. Water flush 120 ml every 4 hours  Modulars: None Not indicated at this time   Enteral regimen at goal to provide:  1485 calories (100% estimated calorie needs), 82 grams protein (100% estimated protein needs) and 1471ml free fluid (~1ml/kcal) calculations based on 22 hour infusion. Nutritional Supplement Therapy:   Active electrolyte replacement per nutrition support protocols  Replacement indicated:  None  Labs:   EN labs: BMP daily, Mg MWF and Phos MWF. Meals and Snacks:   NPO     Malnutrition Assessment:  Malnutrition Status: At risk for malnutrition (specify) (NPO since 1/14, advanced age, unknown intake PTA)    Nutrition Assessment:   Nutrition History: Unable to assess 2/2 clinical condition. Per EMR review, pt preview pt previously able to tolerate regular diet during admission however NPO since 1/14 d/t SLP evaluation oropharyngeal dysphagia characterized by impaired oral acceptance, prolonged oral holding, and incoordinated swallow which resulted in overt s/sx of airway compromise with thin liquids. Per EMR review, no recent wt loss. Nutrition Background: Pt with PMH significant for  h/o HTN, DM, recent fall while on cruise resulting in humerus fracture, now with another fall at home, found to have pneumonia with hypoxia and UTI. + COVID-19 1/16 during admission. Transferred to ICU 1/16 d/t decompensation and increased O2 demands. Daily Update:  Discussed pt with Dutch Chavez RN.  RN reports pt tolerating trophic TF thus far, discussed beginning advancement to goal. TPN to finish infusing at 1800, will not reorder tonight. Per SLP note, possible MBS pending. Abdominal Status (last documented): Obese,Soft abdomen with Active  bowel sounds. Last BM 01/09/22 (per flowsheet). Pertinent Medications: decadron, lantus (20 units), SSI (15 units given 1/19, 14 given thus far today), nystatin, protonix BID  Pertinent Labs:  Lab Results   Component Value Date/Time    Sodium 143 01/20/2022 04:28 AM    Potassium 3.7 01/20/2022 04:28 AM    Chloride 109 (H) 01/20/2022 04:28 AM    CO2 25 01/20/2022 04:28 AM    Anion gap 9 01/20/2022 04:28 AM    Glucose 195 (H) 01/20/2022 04:28 AM    BUN 26 (H) 01/20/2022 04:28 AM    Creatinine 0.31 (L) 01/20/2022 04:28 AM    Calcium 8.8 01/20/2022 04:28 AM    Albumin 2.9 (L) 01/12/2022 02:29 AM    Magnesium 2.9 (H) 01/19/2022 04:07 AM    Phosphorus 3.1 01/19/2022 04:07 AM     Lab Results   Component Value Date/Time    Glucose 195 (H) 01/20/2022 04:28 AM    Glucose (POC) 227 (H) 01/20/2022 11:31 AM    Glucose (POC) 173 (H) 01/20/2022 07:35 AM    Glucose (POC) 193 (H) 01/20/2022 04:01 AM    Glucose (POC) 154 (H) 01/20/2022 12:07 AM    Glucose (POC) 162 (H) 01/19/2022 08:32 PM    Glucose (POC) 165 (H) 01/19/2022 04:16 PM       Nutrition Related Findings:   NFPE deferred d/t isolation precautions. No visisble wasting noted through window. Pt on NC during assessment, previous need for CPAP/BiPAP. Remains NPO. TPN to start 1/18. TPN started 1/18, transferred to floor and now on NC. NGT to attempt to be placed. TPN to continue until NGT placed and TF tolerance established. 1/20- Tolerating trophic TF, plan to complete TPN infusion this evening and not reorder. Begin to advance TF to goal. Possible MBS pending.       Current Nutrition Therapies:  DIET NPO  ADULT TUBE FEEDING   Current Tube Feeding (TF) Orders:   · Feeding Route: Nasogastric  · Formula: Diabetic  · Schedule:Continuous · Regimen: 10ml/hr  · Additives/Modulars:  (none)  · Water Flushes: 10ml/hr  · Current TF & Flush Orders Provides: trophic TF not intended to meet estimated needs  · Goal TF & Flush Orders Provides:      Current Parenteral Nutrition Orders:  · Type and Formula: 5%DEX/4.25%AA   · Lipids: None  · Duration: Continuous  · Rate/Volume: 65ml/hr  · Current PN Order Provides: 530 kcal/d (44% of needs), 66 grams of protein/d (83% of needs), 78 grams of CHO/d and 1560 ml of total volume/d. · Goal PN Orders Provides:      Current Intake:   Average Meal Intake: NPO        Anthropometric Measures:  Height: 5' 2\" (157.5 cm)  Current Body Wt: 90 kg (198 lb 6.6 oz) (1/20), Weight source: Not specified  BMI: 36.3, Obese class 2 (BMI 35.0-39. 9)     Ideal Body Weight (lbs) (Calculated): 110 lbs (50 kg), 200 %  Usual Body Wt: 95.7 kg (211 lb) (per MD encounter 12/11/2021), Percent weight change: 4.3          Edema: Generalized: Trace (1/20/2022  7:34 AM)  RUE: Non-pitting (1/19/2022  7:28 PM)     Estimated Daily Nutrient Needs:  Energy (kcal/day): 3537-2361 (Kcal/kg (15-20), Weight Used: Current (90.6kg))  Protein (g/day): 68-82 (20% estimated kcal needs) Weight Used: (Current (90.6kg))  Fluid (ml/day):   (1 ml/kcal)    Nutrition Diagnosis:   · Inadequate oral intake related to swallowing difficulty,impaired respiratory function as evidenced by NPO or clear liquid status due to medical condition,nutrition support-enteral nutrition (desatting with sips of liquid, dysphagia)       Nutrition Interventions:   Food and/or Nutrient Delivery: Continue NPO,Modify tube feeding,Discontinue parenteral nutrition     Coordination of Nutrition Care: Continue to monitor while inpatient  Plan of Care discussed with Mushtaq Glaser RN    Goals:   Previous Goal Met: Goal(s) achieved  Active Goal: Tolerate TF at goal within 3 days    Nutrition Monitoring and Evaluation:      Food/Nutrient Intake Outcomes: Diet advancement/tolerance,Enteral nutrition intake/tolerance  Physical Signs/Symptoms Outcomes: Biochemical data,Chewing or swallowing,GI status,Fluid status or edema,Hemodynamic status,Weight    Discharge Planning:     Too soon to determine    Ada Caddy Eveleen Goodpasture) GAEL Rocha, LD  Contact: [phone]      Disaster Mode Active

## 2022-01-20 NOTE — PROGRESS NOTES
[facility] 78 CRITICAL CARE OUTREACH NURSE PROGRESS REPORT      SUBJECTIVE: Called to assess patient secondary to transfer from ICU. MEWS Score: 3 (01/19/22 2215)  Vitals:    01/19/22 1600 01/19/22 1928 01/19/22 2056 01/19/22 2215   BP:  139/80  (!) 150/85   Pulse: (!) 112 (!) 115  (!) 113   Resp:  18  18   Temp:  98.8 °F (37.1 °C)  98.4 °F (36.9 °C)   SpO2:  95% 96% 98%   Weight:       Height:          LAB DATA:    Recent Labs     01/19/22  0407 01/18/22  1516 01/18/22  1058 01/17/22  1351     --  143 146*   K 3.5  --  4.0 3.1*   *  --  110* 111*   CO2 26  --  21 28   AGAP 9  --  12 7   *  --  199* 184*   BUN 21  --  18 20   CREA 0.42*  --  0.38* 0.49*   GFRAA >60  --  >60 >60   GFRNA >60  --  >60 >60   CA 8.8  --  8.8 8.6   MG 2.9* 1.8  --  1.7*   PHOS 3.1 2.7  --  2.8        Recent Labs     01/19/22  0407 01/18/22  1058 01/17/22  0452   WBC 11.6* 11.7* 12.2*   HGB 11.7 12.2 12.5   HCT 38.4 38.1 39.3    374 378          OBJECTIVE: On arrival to room, I found patient to be resting in bed. Pain Assessment  Pain Intensity 1: 0 (01/19/22 1928)  Pain Location 1: Shoulder  Pain Intervention(s) 1: Repositioned  Patient Stated Pain Goal: 3    ASSESSMENT:  Patient resting on 3L NC, alert. Sling still in place to L arm, tube feeds infusing. Patient asking for water but failed speech and is NPO. No other complaints at this time. PLAN:  Will continue to follow per outreach protocol.

## 2022-01-20 NOTE — PROGRESS NOTES
Alteplase administration cancelled. MD informed this RN that due to the fracture, there is an increased chance of bleeding into the fracture. This RN informed lab techs that the patient will need labs to be drawn by them.

## 2022-01-21 NOTE — PROGRESS NOTES
01/20/22 2212   Oxygen Therapy   O2 Sat (%) 98 %   Pulse via Oximetry 110 beats per minute   O2 Device BIPAP   FIO2 (%) 30 %   Respiratory   Respiratory (WDL) X   Respiratory Pattern Tachypneic   Chest/Tracheal Assessment Chest expansion, symmetrical   CPAP/BIPAP   CPAP/BIPAP Start/Stop On   Device Mode BIPAP   $$ Bipap Daily Yes   Mask Type and Size Full face   Skin Condition intact   PIP Observed 14 cm H20   IPAP (cm H2O) 14 cm H2O   EPAP (cm H2O) 8 cm H2O   Inspiratory Time (sec) 0.9 seconds   Vt Spont (ml) 419 ml   Ve Observed (l/min) 13 l/min   Backup Rate 16   Total RR (Spontaneous) 29 breaths per minute   Insp Rise Time (sec) 3   Leak (Estimated) 3 L/min   Pt's Home Machine No   Biomedical Check Performed Yes   Settings Verified Yes   Alarm Settings   High Pressure 35   Low Pressure 5   Apnea 20   Low Ve 3   High Rate 50   Low Rate 8   Pulmonary Toilet   Pulmonary Toilet Cough and deep breath;H. O.B elevated

## 2022-01-21 NOTE — PROGRESS NOTES
Date of Outreach Update:  Sonya Aguirre was seen and assessed. Patient resting quietly in bed on BiPAP 30%. Tube feeds infusing. Sling in place to left arm. No distress noted. MEWS Score: 3 (01/20/22 1927)  Vitals:    01/20/22 1927 01/20/22 2005 01/20/22 2212 01/20/22 2213   BP: 133/83   125/86   Pulse: (!) 116   (!) 111   Resp: 16   28   Temp: 98.1 °F (36.7 °C)   97.9 °F (36.6 °C)   SpO2: 99% 99% 98% 97%   Weight:       Height:             Pain Assessment  Pain Intensity 1: 0 (01/20/22 1955)  Pain Location 1: Shoulder  Pain Intervention(s) 1: Repositioned  Patient Stated Pain Goal: 0      Previous Outreach assessment has been reviewed. There have been no significant clinical changes since the completion of the last dated Outreach assessment. Patient has completed outreach program. Please call outreach RN for any further concerns. Thank you.     Signed By:   Jacqui Kinsey RN    January 21, 2022 2:38 AM

## 2022-01-21 NOTE — PROGRESS NOTES
Patient resting quietly in bed. Respirations even and unlabored on BIPAP. No acute events overnight. [place] to left nare. Glucerna infusing at 40ml/hr with 120ml water flush every 4 hours. Nair draining clear yellow urine. Blood drawn from left subclavian quad lumen and sent to lab. Safety measures in place. No distress noted. Will continue to monitor and give report to oncoming nurse.

## 2022-01-21 NOTE — PROGRESS NOTES
Patient resting in bed quietly. Alert and oriented to person and place. Disoriented to time and situation. Respirations even and unlabored on 3L NC. NGT in place with tube feedings infusing @ 25ml/hr. No acute distress. Patient denies needs at present. Safety measures in place. Will continue to monitor.

## 2022-01-21 NOTE — PROGRESS NOTES
Problem: Pressure Injury - Risk of  Goal: *Prevention of pressure injury  Description: Document Jaime Scale and appropriate interventions in the flowsheet. Outcome: Progressing Towards Goal  Note: Pressure Injury Interventions:  Sensory Interventions: Assess changes in LOC,Keep linens dry and wrinkle-free,Monitor skin under medical devices    Moisture Interventions: Absorbent underpads,Apply protective barrier, creams and emollients,Internal/External urinary devices,Minimize layers    Activity Interventions: PT/OT evaluation    Mobility Interventions: PT/OT evaluation    Nutrition Interventions: Discuss nutritional consult with provider    Friction and Shear Interventions: Foam dressings/transparent film/skin sealants,Lift sheet,Minimize layers         Patient is being turned Q2 hours and tolerating. Will monitor.

## 2022-01-21 NOTE — PROGRESS NOTES
Notified Dr. Tushar Simms of MEWS of 4 this AM.        01/21/22 0749   Vital Signs   Temp 99.5 °F (37.5 °C)   Temp Source Axillary   Pulse (Heart Rate) (!) 118   Resp Rate 22   O2 Sat (%) 97 %   Level of Consciousness Alert (0)   /72   MAP (Calculated) 86   MEWS Score 4

## 2022-01-21 NOTE — PROGRESS NOTES
LTG: Patient will tolerate least restrictive diet without overt signs or symptoms of airway compromise. STG: Patient will tolerate po trials with SLP only without overt signs or symptoms of airway compromise. STG: Patient will participate in modified barium swallow study as clinically indicated. SPEECH LANGUAGE PATHOLOGY: DYSPHAGIA- Daily Note 3    NAME/AGE/GENDER: Rex Rader is a 57 y.o. female  DATE: 1/21/2022  PRIMARY DIAGNOSIS: Pneumonia [J18.9]      ICD-10: Treatment Diagnosis: R13.12 Dysphagia, Oropharyngeal Phase    RECOMMENDATIONS   DIET:    NPO; short term alternate means of nutrition/hydration/meds    MEDICATIONS: Non-oral     PRECAUTIONS, MODIFICATIONS, AND STRATEGIES  · Oral care every 3 hours     RECOMMENDATIONS FOR CONTINUED SPEECH THERAPY:   YES: Anticipate need for ongoing speech therapy during this hospitalization. and likely next level of care. ASSESSMENT   Patient is making minimal progress towards speech therapy goals. She is alert and participates well in po trials, but is less verbal today. She continues to exhibit overt coughing in response to thin and mildly thick liquids by tsp with increased work of breathing and facial redness that accompanies each incident. Recommended continue strict NPO with non-oral medication. NG tube is now in place for nutrition/hydration. Possible modified barium swallow study next week to objectively assess swallow function. Of note, she continues to present with R head turn. She expresses pain when clinician assists to re-position head to midline, and she is unable to maintain this position. CT negative for acute findings, and unclear etiology. ? If this positioning is contributing to dysphagia.      EDUCATION:  · Recommendations discussed with Patient and RN     REHABILITATION POTENTIAL FOR STATED GOALS: Good    PLAN    FREQUENCY/DURATION:   Continue to follow patient 3 times a week for duration of hospital stay to address above goals. Recommendations for next treatment session: Next treatment session will address diet tolerance and po trials    CONTINUATION OF SKILLED SERVICES/MEDICAL NECESSITY:   Patient is expected to demonstrate progress in  swallow strength, swallow timeliness, swallow function, diet tolerance and swallow safety in order to  improve swallow safety, work toward diet advancement and decrease aspiration risk.  Patient continues to require skilled intervention due to dysphagia. .       SUBJECTIVE   Patient alert, but non-verbal this morning. Only nodding to answer questions    Oxygen Device: nasal cannula  Pain: Pain Scale 1: Adult Nonverbal Pain Scale  Pain Intensity 1: 0    History of Present Injury/Illness: Ms. Peterson Olivier  has a past medical history of Asthma, Diabetes mellitus ([facility] [address] Gastroparesis (2021), History of CVA (cerebrovascular accident), Neuropathy, Obesity, and Sleep apnea. . She also  has a past surgical history that includes hx  section; hx breast biopsy; hx refractive surgery; pr egd deliver thermal energy sphnctr/cardia gerd (2021); hx colonoscopy (2018); and pr egd deliver thermal energy sphnctr/cardia gerd (2021). PRECAUTIONS/ALLERGIES: Other food, Contrast agent [iodine], and Banana     Problem List:  (Impairments causing functional limitations)  1.oropharyngeal dysphagia  2. Impaired language abilities       Orientation:  No verbalizations     OBJECTIVE   Dysphagia treatment: po trials  Continued R head turn at baseline with pain when repositioned to midline by clinician. She is unable to maintain midline position. Appropriate acceptance with ice chips, thin by tsp, and nectar by tsp. Brief bolus holding prior to initiating swallow with all trials. Immediate cough in response to thin and mildly thick liquids with facial redness and watery eyes. She is not a safe candidate for additional po trials at this time due to severity of dysphagia symptoms with tsp sips of liquid. Continue to ? If mentation/ reported encephalopathy is contributing to dysphagia vs. Baseline issue. Anticipate modified barium swallow early next week in hopes of improved mentation at that time. Tool Used: Dysphagia Outcome and Severity Scale (SORAIDA)    Score Comments   Normal Diet  [] 7 With no strategies or extra time needed   Functional Swallow  [] 6 May have mild oral or pharyngeal delay   Mild Dysphagia  [] 5 Which may require one diet consistency restricted    Mild-Moderate Dysphagia  [] 4 With 1-2 diet consistencies restricted   Moderate Dysphagia  [] 3 With 2 or more diet consistencies restricted   Moderate-Severe Dysphagia  [] 2 With partial PO strategies (trials with ST only)   Severe Dysphagia  [] 1 With inability to tolerate any PO safely      Score:  Initial: 1 Most Recent: 1 (Date 01/21/22 )   Interpretation of Tool: The Dysphagia Outcome and Severity Scale (SORAIDA) is a simple, easy-to-use, 7-point scale developed to systematically rate the functional severity of dysphagia based on objective assessment and make recommendations for diet level, independence level, and type of nutrition.      INTERDISCIPLINARY COLLABORATION: RN    After treatment position/precautions:  · Upright in bed  · RN notified  ·     Total Treatment Duration:   Time In: 2105  Time Out: CURTIS Adams [employer], [facility]AK, [facility]-SLP  Speech Language Pathologist  Acute Rehabilitation Services  Contact: Chriss

## 2022-01-22 NOTE — PROCEDURES
Procedure: Endotracheal intubation    Indication: Acute respiratory failure 518.81    Medications:  None pt unresponsive    After assessing the airway, the patient underwent preoxygenation with 100% FiO2 for 5 min. A Glidescope 4.0 laryngoscope was used to visualize the epiglottis and vocal cords. There was bilious material throughout the airway and an obstructing piece of food. After positive identification of epiglottis and the vocal cords, a size 8.0 ET tube was placed into the trachea with direct visualization. Confirmation of endotracheal positionin. The ET tube was directly visualized passing through the vocal cords. 2.  CO2 colorimetry was employed immediately to verify tube in airway with appropriate color change indicating detection/lack of CO2.   3.  Water vapor was seen within the ET tube, and auscultation of the abdomen revealed no bubbling sounds. 4.  Auscultation  And inspection of the chest after intubation showed symmetric chest excursion and symmetric air entry bilaterally. There were no complications.     Santiago Stark MD

## 2022-01-22 NOTE — PROGRESS NOTES
Patient has order for blood cultures and lab has sent three people to collect them and have been unable to collect them, notified physician on call via [employer] and was told to document that specimes have not been collected.

## 2022-01-22 NOTE — DISCHARGE SUMMARY
Hospitalist Discharge Summary for  Patient   Admit Date:  2022 10:04 PM   DC Note date: 2022  Name:  Dori Baird   Age:  57 y.o. Sex:  female  :  1960   MRN:  [id number]   Room:  [facility]  PCP:  Deb Leija MD    Problem List for this Hospitalization:  Hospital Problems as of 2022 Never Reviewed          Codes Class Noted - Resolved POA    Hematemesis with nausea ICD-10-CM: K92.0  ICD-9-CM: 578.0, 787.02  1/15/2022 - Present No        Hypernatremia ICD-10-CM: E87.0  ICD-9-CM: 276.0  1/15/2022 - Present No        Encephalopathy ICD-10-CM: G93.40  ICD-9-CM: 348.30  1/15/2022 - Present Unknown        Metabolic acidosis JLX-70-QU: E87.2  ICD-9-CM: 276.2  2022 - Present No        Pneumonia ICD-10-CM: J18.9  ICD-9-CM: 962  2022 - Present Yes        UTI (urinary tract infection) ICD-10-CM: N39.0  ICD-9-CM: 599.0  2022 - Present Yes        Hypoxia ICD-10-CM: R09.02  ICD-9-CM: 799.02  2022 - Present Yes        Obesity (Chronic) ICD-10-CM: E66.9  ICD-9-CM: 278.00  2022 - Present Yes        Closed fracture of surgical neck of humerus ICD-10-CM: S42.213A  ICD-9-CM: 812.01  2022 - Present Yes        Acute respiratory failure with hypoxia ([facility] [address] ICD-10-CM: J96.01  ICD-9-CM: 518.81  2022 - Present Yes        * (Principal) Sepsis ([facility] [address] ICD-10-CM: A41.9  ICD-9-CM: 038.9, 995.91  2022 - Present Yes            Did Patient have Sepsis (YES OR NO): Yes    Hospital Course:  Ms. Bob Hamilton is a 56 yo female with PMH of asthma, DM2, CVA, obesity, neuropathy, JESSICA on CPAP admitted s/p fall with recent left humeral fracture that occurred while on [employer].  Ortho consulted and and recommends CT left shoulder and outpatient followup with Dr. Tony Gaytan also found hypoxic and met sepsis criteria due to fever / leukocytosis.  Sepsis work-up showed LLL pneumonia and UTI.  COVID negative.  She was started on empiric antibiotic.       Patient was found to be in diabetic ketoacidosis on 1/13 and started on glucose stabilizer with improvement.  Patient placed back on insulin gtt. and bicarb for recurrent DKA on 1/16.  Patient initially required BiPAP and then transitioned to nasal cannula.  Patient started spiking high-grade fevers on 1/16.   notified family member positive for Rickford Roll was rechecked for COVID and came positive.      CT head was done for concern for confusion and showed no strokes but there is a concern for a fungal right maxillary sinus infection. She was started on fluconazole. Infectious disease consulted and recommend no invasion of bone on imaging and no finding on skin, eye or palate to suggest Mucor.  She could possibly have an Aspergillus chronic sinusitis, and recommend to discontinue fluconazole and continue to treat E. coli UTI.  Fungitell and Aspergillus antigen from serum ordered.     Patient had an episode of coffee-ground emesis on 1/15. Dory Hillman consulted and recommend continue PPI.  1/21 VQ scan showed possible right lower lobe PE which may be partial obstructing. Nursing staff was notified at 7:51 PM.  This morning patient had evidence of bilious material from mouth. Concern for aspiration causing cardiac arrest.    1/22 CODE BLUE was called on patient due to unresponsiveness and pulselessness. CPR was initiated. Patient went through several rounds of epinephrine, bicarb, calcium, defibrillation x3, amnio and tPA for possible PE that was found on VQ scan. Patient unfortunately never achieved ROSC. Time of Death:   0923    Cause of Death:   Cardiac arrest     Time spent in patient discharge work and death certification 62 minutes. Procedures done this admission:  * No surgery found *    Consults this admission:  IP CONSULT TO PULMONOLOGY  IP CONSULT TO GASTROENTEROLOGY  IP CONSULT TO INFECTIOUS DISEASES    Echocardiogram/EKG results:  No results found for this or any previous visit.        EKG Results     Procedure 720 Value Units Date/Time    EKG 12 LEAD INITIAL [[id number]] Collected: 01/11/22 2221    Order Status: Completed Updated: 01/12/22 1504     Ventricular Rate 94 BPM      Atrial Rate 93 BPM      P-R Interval 177 ms      QRS Duration 92 ms      Q-T Interval 355 ms      QTC Calculation (Bezet) 444 ms      Calculated P Axis 58 degrees      Calculated R Axis 124 degrees      Calculated T Axis 64 degrees      Diagnosis --     Sinus rhythm  Anterior infarct, old  Non-specific ST-t wave changes ST elevation consider inferior injury or acute   infarct    Confirmed by ARTHUR PETTY (), Yoana Gonzalez ([id number]) on 1/12/2022 3:04:07 PM      EKG 12 LEAD INITIAL [[id number]]     Order Status: Completed           Diagnostic Imaging/Tests:   XR SHOULDER LT AP/LAT MIN 2 V    Result Date: 1/12/2022  EXAM: Left shoulder x-rays. INDICATION: Pain. COMPARISON: None. TECHNIQUE: 3 views. FINDINGS: There is a comminuted and minimally displaced fracture involving the humeral head and neck. No other fractures are identified. Glenohumeral and acromioclavicular joint alignment are anatomic. Humeral head and neck fracture. XR HIP RT W OR WO PELV 2-3 VWS    Result Date: 1/11/2022  EXAM: Pelvis and right hip x-rays. INDICATION: Pain after falling. COMPARISON: None. TECHNIQUE: Frontal pelvis and two views right hip. FINDINGS: No acute fracture or dislocation. The pelvic ring is intact. No significant degenerative changes. No acute process. CT SHOULDER LT WO CONT    Result Date: 1/12/2022  Exam: CT SHOULDER LT WO CONT on 1/12/2022 10:30 AM Clinical History: The Female patient is 57years old  presenting for proximal humerus. Technique: Thin section CT images through the left proximal humerus and shoulder were obtained. To optimally assess the anatomic relationships of the bones to one another, as well as to optimally assess the individual osseous structures, multiplanar reformatted images were also available for review.  All CT scans at this facility are performed using dose reduction/dose modulation techniques, as appropriate the performed exam, including the following: Automated Exposure Control; Adjustment of the mA and/or kV according to patient size (this includes techniques or standardized protocols for targeted exams where dose is matched to indication/reason for exam); and Use of Iterative Reconstruction Technique. Radiation Exposure Indices: Reference Air Kerma (Ka,r) = 556 mGy-cm Comparison:  Left shoulder films 1/12/2022 0201 hours. FINDINGS: Multiplanar imaging of unchanged left shoulder proximal humerus demonstrates a comminuted and slightly impacted humeral neck fracture with avulsion of the greater humeral tuberosity. Fracture fragments extend into the articular surface of the humeral head. There is otherwise a small associated joint effusion. 1. Slightly impacted and comminuted left humeral neck and head fracture. XR CHEST PORT    Result Date: 1/11/2022  EXAM: Chest x-ray. INDICATION: Dyspnea. COMPARISON: Prior chest x-ray on March 23, 2020. TECHNIQUE: Single frontal view. FINDINGS: There is mild left lower lobe atelectasis or infiltrate, new from prior. The right lung is clear. The heart size is within normal limits for the portable technique. No pneumothorax, vascular congestion or pleural effusion is seen. Degenerative changes in the spine. Mild left lower lobe atelectasis or pneumonia.       All Micro Results     Procedure Component Value Units Date/Time    CULTURE, BLOOD [[id number]] Collected: 01/22/22 0511    Order Status: Completed Specimen: Blood Updated: 01/22/22 0603    CULTURE, BLOOD [[id number]] Collected: 01/21/22 1600    Order Status: Canceled Specimen: Blood     CULTURE, BLOOD [[id number]] Collected: 01/12/22 0229    Order Status: Completed Specimen: Blood Updated: 01/17/22 0801     Special Requests: --        RIGHT  Antecubital       Culture result: NO GROWTH 5 DAYS       CULTURE, BLOOD [[id number]] Collected: 01/12/22 1249    Order Status: Completed Specimen: Blood Updated: 01/17/22 0801     Special Requests: --        RIGHT  FOREARM       Culture result: NO GROWTH 5 DAYS       SARS-COV-2, PCR [[id number]]  (Abnormal) Collected: 01/16/22 0808    Order Status: Completed Specimen: Nasopharyngeal Updated: 01/16/22 1645     Specimen source Nasopharyngeal        SARS-CoV-2 Detected        Comment:      The specimen is POSITIVE for SARS-CoV-2, the novel coronavirus associated with COVID-19. This test has been authorized by the FDA under an Emergency Use Authorization (EUA) for use by authorized laboratories. Fact sheet for Healthcare Providers: The Art Commissiondate.co.nz  Fact sheet for Patients: https://fda.gov/media/904177/download       Methodology: RT-PCR  RESULTS VERIFIED, PHONED TO AND READ BACK BY  TAMRA Wade RN ON 01/16/22 @1645, ADS         FUNGUS CULTURE AND SMEAR [[id number]] Collected: 01/15/22 1608    Order Status: Canceled Specimen: Other     FUNGUS CULTURE AND SMEAR [[id number]] Collected: 01/15/22 1517    Order Status: Canceled Specimen: Other     CULTURE, URINE [[id number]]  (Abnormal)  (Susceptibility) Collected: 01/12/22 0227    Order Status: Completed Specimen: Urine from Clean catch Updated: 01/15/22 0733     Special Requests: NO SPECIAL REQUESTS        Culture result:       >100,000 COLONIES/mL ESCHERICHIA COLI                  50,000-100,000 COLONIES/mL NORMAL SKIN SNOW ISOLATED          COVID-19 RAPID TEST [[id number]] Collected: 01/12/22 5440    Order Status: Completed Specimen: Nasopharyngeal Updated: 01/12/22 0320     Specimen source NASAL        COVID-19 rapid test Not detected        Comment:      The specimen is NEGATIVE for SARS-CoV-2, the novel coronavirus associated with COVID-19. A negative result does not rule out COVID-19. This test has been authorized by the FDA under an Emergency Use Authorization (EUA) for use by authorized laboratories. Fact sheet for Healthcare Providers: ConventionUpdate.co.nz  Fact sheet for Patients: ConventionUpdate.co.nz       Methodology: Isothermal Nucleic Acid Amplification               Labs: Results:       BMP, Mg, Phos Recent Labs     01/22/22  0511 01/21/22  0525 01/20/22  0428   * 144 143   K 4.1 3.7 3.7   * 108* 109*   CO2 20* 26 25   AGAP 10 10 9   BUN 47* 35* 26*   CREA 0.76 0.53* 0.31*   CA 8.4 8.6 8.8   * 245* 195*   MG 2.6* 2.1  --    PHOS  --  3.4  --       CBC Recent Labs     01/22/22  0511 01/21/22  0525 01/20/22  0428   WBC 16.9* 15.3* 11.8*   RBC 5.61* 5.10 4.88   HGB 15.3 13.5 13.3   HCT 50.1* 43.5 42.8   * 461* 382      LFT Recent Labs     01/22/22  0511 01/21/22  0525   ALT 29 18   AP 85 85   TP 6.8 6.6   ALB 2.5* 2.7*   GLOB 4.3* 3.9*   AGRAT 0.6* 0.7*      Cardiac Testing No results found for: BNPP, BNP, CPK, RCK1, RCK2, RCK3, RCK4, CKMB, CKNDX, CKND1, TROPT, TROIQ   Coagulation Tests Lab Results   Component Value Date/Time    aPTT 23.7 (L) 01/22/2022 05:11 AM      A1c Lab Results   Component Value Date/Time    Hemoglobin A1c 7.0 (H) 01/14/2022 08:38 AM      Lipid Panel Lab Results   Component Value Date/Time    Triglyceride 167 (H) 01/19/2022 04:07 AM      Thyroid Panel No results found for: TSH, T4, FT4, TT3, T3U, TSHEXT     Most Recent UA Lab Results   Component Value Date/Time    Color YELLOW 01/21/2022 04:12 PM    Appearance TURBID 01/21/2022 04:12 PM    pH (UA) 5.5 01/21/2022 04:12 PM    Protein 30 (A) 01/21/2022 04:12 PM    Glucose >1,000 01/21/2022 04:12 PM    Ketone >80 (A) 01/21/2022 04:12 PM    Bilirubin Negative 01/21/2022 04:12 PM    Blood Negative 01/21/2022 04:12 PM    Urobilinogen 0.2 01/21/2022 04:12 PM    Nitrites Negative 01/21/2022 04:12 PM    Leukocyte Esterase Negative 01/21/2022 04:12 PM    WBC 10-20 01/21/2022 04:12 PM    RBC 10-20 01/21/2022 04:12 PM    Epithelial cells 0-3 01/21/2022 04:12 PM    Bacteria 0 01/21/2022 04:12 PM    Casts 10-20 01/21/2022 04:12 PM    Crystals, urine MODERATE 01/21/2022 04:12 PM    Mucus TRACE 01/21/2022 04:12 PM    Other observations RESULTS VERIFIED MANUALLY 01/21/2022 04:12 PM          All Labs from Last 24 Hrs:  Recent Results (from the past 24 hour(s))   GLUCOSE, POC    Collection Time: 01/21/22 11:29 AM   Result Value Ref Range    Glucose (POC) 232 (H) 65 - 100 mg/dL    Performed by Sophia    URINALYSIS W/ RFLX MICROSCOPIC    Collection Time: 01/21/22  4:12 PM   Result Value Ref Range    Color YELLOW      Appearance TURBID      Specific gravity 1.048 (H) 1.001 - 1.023      pH (UA) 5.5 5.0 - 9.0      Protein 30 (A) NEG mg/dL    Glucose >1,000 mg/dL    Ketone >80 (A) NEG mg/dL    Bilirubin Negative NEG      Blood Negative NEG      Urobilinogen 0.2 0.2 - 1.0 EU/dL    Nitrites Negative NEG      Leukocyte Esterase Negative NEG      WBC 10-20 0 /hpf    RBC 10-20 0 /hpf    Epithelial cells 0-3 0 /hpf    Bacteria 0 0 /hpf    Casts 10-20 0 /lpf    Crystals, urine MODERATE 0 /LPF    Mucus TRACE 0 /lpf    Yeast MODERATE      Other observations RESULTS VERIFIED MANUALLY     GLUCOSE, POC    Collection Time: 01/21/22  4:20 PM   Result Value Ref Range    Glucose (POC) 259 (H) 65 - 100 mg/dL    Performed by Demarcus Aly    C REACTIVE PROTEIN, QT    Collection Time: 01/21/22 11:02 PM   Result Value Ref Range    C-Reactive protein 0.5 0.0 - 0.9 mg/dL   PROCALCITONIN    Collection Time: 01/21/22 11:02 PM   Result Value Ref Range    Procalcitonin <0.05 0.00 - 0.49 ng/mL   GLUCOSE, POC    Collection Time: 01/22/22 12:07 AM   Result Value Ref Range    Glucose (POC) 217 (H) 65 - 100 mg/dL    Performed by Gonzalo    CBC W/O DIFF    Collection Time: 01/22/22  5:11 AM   Result Value Ref Range    WBC 16.9 (H) 4.3 - 11.1 K/uL    RBC 5.61 (H) 4.05 - 5.2 M/uL    HGB 15.3 11.7 - 15.4 g/dL    HCT 50.1 (H) 35.8 - 46.3 %    MCV 89.3 79.6 - 97.8 FL    MCH 27.3 26.1 - 32.9 PG    MCHC 30.5 (L) 31.4 - 35.0 g/dL    RDW 16.8 (H) 11.9 - 14.6 %    PLATELET 107 (H) 281 - 450 K/uL    MPV 10.0 9.4 - 12.3 FL    ABSOLUTE NRBC 0.00 0.0 - 0.2 K/uL   METABOLIC PANEL, COMPREHENSIVE    Collection Time: 01/22/22  5:11 AM   Result Value Ref Range    Sodium 146 (H) 136 - 145 mmol/L    Potassium 4.1 3.5 - 5.1 mmol/L    Chloride 116 (H) 98 - 107 mmol/L    CO2 20 (L) 21 - 32 mmol/L    Anion gap 10 7 - 16 mmol/L    Glucose 356 (H) 65 - 100 mg/dL    BUN 47 (H) 8 - 23 MG/DL    Creatinine 0.76 0.6 - 1.0 MG/DL    GFR est AA >60 >60 ml/min/1.73m2    GFR est non-AA >60 >60 ml/min/1.73m2    Calcium 8.4 8.3 - 10.4 MG/DL    Bilirubin, total 0.3 0.2 - 1.1 MG/DL    ALT (SGPT) 29 12 - 65 U/L    AST (SGOT) 34 15 - 37 U/L    Alk.  phosphatase 85 50 - 136 U/L    Protein, total 6.8 6.3 - 8.2 g/dL    Albumin 2.5 (L) 3.2 - 4.6 g/dL    Globulin 4.3 (H) 2.3 - 3.5 g/dL    A-G Ratio 0.6 (L) 1.2 - 3.5     D DIMER    Collection Time: 01/22/22  5:11 AM   Result Value Ref Range    D DIMER 5.65 (H) <0.56 ug/ml(FEU)   MAGNESIUM    Collection Time: 01/22/22  5:11 AM   Result Value Ref Range    Magnesium 2.6 (H) 1.8 - 2.4 mg/dL   PTT    Collection Time: 01/22/22  5:11 AM   Result Value Ref Range    aPTT 23.7 (L) 24.1 - 35.1 SEC   GLUCOSE, POC    Collection Time: 01/22/22  5:49 AM   Result Value Ref Range    Glucose (POC) 341 (H) 65 - 100 mg/dL    Performed by Gonzalo        Current Med List in Hospital:   Current Facility-Administered Medications   Medication Dose Route Frequency    heparin (porcine) 1,000 unit/mL injection 7,130 Units  80 Units/kg IntraVENous ONCE    heparin 25,000 units in dextrose 500 mL infusion  18-36 Units/kg/hr IntraVENous TITRATE    insulin glargine (LANTUS) injection 25 Units  25 Units SubCUTAneous DAILY    insulin regular (NOVOLIN R, HUMULIN R) injection   SubCUTAneous Q6H    metoprolol (LOPRESSOR) injection 5 mg  5 mg IntraVENous Q6H PRN    docusate (COLACE) 50 mg/5 mL oral liquid 100 mg  100 mg Oral DAILY    polyethylene glycol (MIRALAX) packet 17 g  17 g Per NG tube DAILY    dexAMETHasone (DECADRON) tablet 6 mg  6 mg Per NG tube DAILY    lip protectant (BLISTEX) ointment 1 Each  1 Each Topical PRN    alcohol 62% (NOZIN) nasal  1 Ampule  1 Ampule Topical Q12H    NUTRITIONAL SUPPORT ELECTROLYTE PRN ORDERS   Does Not Apply PRN    pantoprazole (PROTONIX) 40 mg in 0.9% sodium chloride 10 mL injection  40 mg IntraVENous BID    albuterol (PROVENTIL VENTOLIN) nebulizer solution 2.5 mg  2.5 mg Nebulization Q4H PRN    nystatin (MYCOSTATIN) 100,000 unit/gram powder   Topical BID    morphine injection 2 mg  2 mg IntraVENous Q4H PRN    sodium chloride (NS) flush 5-40 mL  5-40 mL IntraVENous Q8H    sodium chloride (NS) flush 5-40 mL  5-40 mL IntraVENous PRN    acetaminophen (TYLENOL) tablet 650 mg  650 mg Oral Q6H PRN    Or    acetaminophen (TYLENOL) suppository 650 mg  650 mg Rectal Q6H PRN    ondansetron (ZOFRAN ODT) tablet 4 mg  4 mg Oral Q8H PRN    Or    ondansetron (ZOFRAN) injection 4 mg  4 mg IntraVENous Q6H PRN    magnesium hydroxide (MILK OF MAGNESIA) 400 mg/5 mL oral suspension 30 mL  30 mL Oral DAILY PRN    alum-mag hydroxide-simeth (MYLANTA) oral suspension 15 mL  15 mL Oral Q6H PRN    amitriptyline (ELAVIL) tablet 25 mg  25 mg Oral QHS PRN    atorvastatin (LIPITOR) tablet 20 mg  20 mg Oral DAILY    aspirin delayed-release tablet 81 mg  81 mg Oral DAILY    budesonide-formoterol (SYMBICORT) 80-4.5 mcg inhaler  2 Puff Inhalation BID RT    sertraline (ZOLOFT) tablet 100 mg  100 mg Oral DAILY    HYDROcodone-acetaminophen (NORCO) 5-325 mg per tablet 1 Tablet  1 Tablet Oral Q8H PRN       Allergies   Allergen Reactions    Other Food Swelling     Shell fish    Contrast Agent [Iodine] Anaphylaxis    Banana Itching and Swelling     Mouth itches and swells     Immunization History   Administered Date(s) Administered    COVID-19, Pfizer Purple top, DILUTE for use, 12+ yrs, 30mcg/0.3mL dose 03/16/2021, 04/06/2021    TB Skin Test (PPD) Intradermal 01/12/2022       Recent Vital Data:  Patient Vitals for the past 24 hrs:   Temp Pulse Resp BP SpO2   01/22/22 0937  (!) 0 (!) 0     01/22/22 0923  (!) 0      01/22/22 0921  (!) 0      01/22/22 0911  (!) 0      01/22/22 0908    (!) 121/14    01/22/22 0904  (!) 0      01/22/22 0900  (!) 0 (!) 0 (!) 85/13    01/22/22 0855    (!) 73/13    01/22/22 0816     91 %   01/22/22 0810     (!) 85 %   01/22/22 0630 97.5 °F (36.4 °C) 100 22 116/73 95 %   01/21/22 2316 99.5 °F (37.5 °C) (!) 109 23 115/72 97 %   01/21/22 1535 (!) 100.6 °F (38.1 °C) (!) 125 26 118/77 95 %   01/21/22 1136 (!) 100.6 °F (38.1 °C) (!) 126 22 117/84 96 %     Oxygen Therapy  O2 Sat (%): 91 % (01/22/22 0816)  Pulse via Oximetry: 125 beats per minute (01/22/22 0816)  O2 Device: BIPAP (01/22/22 0816)  Skin Assessment: Clean, dry, & intact (01/22/22 0730)  Skin Protection for O2 Device: No (01/22/22 0730)  Orientation: Bilateral (01/16/22 0745)  Location: Cheek (01/15/22 1900)  Interventions: Mouth Care (01/18/22 0703)  O2 Flow Rate (L/min): 2 l/min (01/22/22 0810)  FIO2 (%): 40 % (01/22/22 0816)    Estimated body mass index is 35.92 kg/m² as calculated from the following:    Height as of this encounter: 5' 2\" (1.575 m). Weight as of this encounter: 89.1 kg (196 lb 6.4 oz). Intake/Output Summary (Last 24 hours) at 1/22/2022 1128  Last data filed at 1/22/2022 0730  Gross per 24 hour   Intake 1400 ml   Output 1600 ml   Net -200 ml         Discharge Exam:  General: Lifeless  Eyes: Pupils fixed/nonreactive  Lungs: No breath sounds  Heart:  No heart sounds  Neurologic: unresponsive    Signed:  Caitlyn Fitzgerald MD    Part of this note may have been written by using a voice dictation software. The note has been proof read but may still contain some grammatical/other typographical errors.

## 2022-01-22 NOTE — PROGRESS NOTES
Assessed patient. Patient is tachypneic, with SpO2=85% on 2L. Increased patient O2 to 4L with no change to SpO2. Patient less responsive than yesterday. Placed patient on BiPAP at documented settings. WOB slowly decreased. Will continue to monitor. RN notified. 01/22/22 0816   Oxygen Therapy   O2 Sat (%) 91 %   Pulse via Oximetry 125 beats per minute   O2 Device BIPAP   FIO2 (%) 40 %   Respiratory   Respiratory (WDL) X   Respiratory Pattern Tachypneic;Dyspnea at rest   Breath Sounds Bilateral Diminished   CPAP/BIPAP   CPAP/BIPAP Start/Stop On   Device Mode BIPAP;S/T   Mask Type and Size Full face   Skin Condition intact   PIP Observed 15 cm H20   IPAP (cm H2O) 4 cm H2O   EPAP (cm H2O) 8 cm H2O   Inspiratory Time (sec) 0.9 seconds   Vt Spont (ml) 420 ml   Ve Observed (l/min) 14.7 l/min   Backup Rate 16   Total RR (Spontaneous) 35 breaths per minute   Insp Rise Time (sec) 3   Leak (Estimated) 6 L/min   Pt's Home Machine No   Biomedical Check Performed Yes   Settings Verified Yes   Alarm Settings   High Pressure 35   Low Pressure 5   Apnea 20   Low Ve 3   High Rate 50   Low Rate 8   Pulmonary Toilet   Pulmonary Toilet H. O.B elevated

## 2022-01-22 NOTE — PROGRESS NOTES
Medicine on call:    Received call from nursing at the request of nuclear medicine department to clarify the patient's contrast/iodine allergy. Patient could not clarify this herself due to her current mental status. Spoke with  and daughter and neither is sure about the exact allergy. The daughter did mention that shellfish causes her mouth to swell.   In light of that, I asked to assume that anaphylaxis may be a possibility and to cancel any test with the use of contrast for now.    -

## 2022-01-22 NOTE — PROCEDURES
PROCEDURE  Bronchoscopy with airway inspection/cleanout    INDICATION   Aspiration into airway    EQUIPMENT:  Glidescope bronchoscope    ANESTHESIA  none    AIRWAY INSPECTION    After obtaining informed consent, using a ET tube adapter, an Olympus Glidescope video bronchoscope was  introduced into the trachea through the  /ET tube, without complication. RIGHT    LOCATION NORM/ABNORM DESCRIPTION   VOCAL CORDS na    TRACHEA NL After suctioning of some bilious material, no obstruction seen in airway. ETT pulled back slightly   WILLIAM NL    RMSB NL Suctioning of bilious material performed   RUL NL    BI NL    RML NL    SUP SEGM RLL NL    MED BASAL NL    ANTERIOR BASAL NL    LATERAL BASAL NL    POSTERIOR BASAL NL           LEFT    LOCATION NORMAL/ABNORMAL TYPE   LMSB NL Suctioning of bilious material performed   BETHANY NL    LINGULA NL    SUPERIOR DIVISION NL    SUPERIOR SEG LLL NL    JACQUELYN-MEDIAL LLL NL    LATERAL LLL NL    POSTERIOR LLL NL      The following samples were obtained: None  Findings:  Aspiration of bilious material without overt airway obstruction at level of trachea, mainstem bronchi.         Dilshad Olivares MD

## 2022-01-22 NOTE — PROGRESS NOTES
Patient has been observed during hourly rounds and has slept at intervals. Patient's tube feeding is patent to G tube and residuals have been less than 10 ml overnight. Patient has been turned and repositioned and will continue to be monitored and assisted, will prepare to give report to oncoming nurse.

## 2022-01-22 NOTE — PROGRESS NOTES
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

## 2022-01-22 NOTE — PROGRESS NOTES
CODE BLUE  Called to code blue for unresponsiveness and pulselessness. CPR initiated and epinephrine administered. Patient noted to have bilious material emanating from mouth and also had a v/q scan suggesting possible PE. Despite intubation and clearance of airway, CPR, several rounds of epinephrine, bicarbonate, calcium,  Defib x 3  For VT/VFand amiodarone 300mg, tPA for massive PT, ROSC was never achieved. Patient's family aware of imminent death.     Kaya Velásquez MD

## 2022-01-22 NOTE — PROGRESS NOTES
Patient was intubated with a number 8.0 ET Tube. Tube placement verified ETCO2 monitor. ET Tube is secured at the 23 cm tez at the lip and on the right side. Patient was intubated by Dr. Gulshan Conroy on the 3rd attempt. Breath sounds are coarse. chest excursion is symmetric. Trachea is midline. Resuscitation bag is at the head of the bed.

## 2022-01-24 NOTE — ADT AUTH CERT NOTES
Comments  Comment            Patient Demographics    Patient Name   Farida Hernandez   [id number] Legal Sex   Female    1960 Address   [address] Phone   [phone] (Home)     Patient Demographics    Patient Name   Farida Hernandez   [id number] Legal Sex   Female    1960 Address   [address] Phone   [phone] (Home)   CSN:   [id number]     Admit Date: Admit Time Room Bed   2022 10:04 PM [id number]       Attending Providers    Provider Pager From To   Michael Marrero MD  22   Kaila June DO  22   Brady Rios MD  22   Adonay Melo MD  22   Brady Rios MD  22   Adonay Melo MD  22   Segundo Summers MD  22   Randy Solano MD  22   Jennifer Gonzalez MD  22   Dakotah Mcleod DO  22   Jeni Maki MD  22   Emily Gunn MD  22     Emergency Contact(s)    Name Relation Home Work pako Chirinos Spouse   [phone]   Mitul Cordero Daughter   [phone]     Utilization Reviews         Pneumonia - Care Day 7 (2022) by Yisel Pineda RN       Review Entered Review Status   2022 14:23 Completed      Criteria Review      Care Day: 7 Care Date: 2022 Level of Care: Inpatient Floor    Guideline Day 2    Level Of Care    (X) Floor    2022 14:19:46 EST by Erica Mathis [id number] bed    Clinical Status    ( ) * No CO2 retention or acidosis    (X) * No requirement for mechanical ventilation    (X) * Hypotension absent    2022 14:19:46 EST by Yisel Pineda      140/68, 130/61, 148/85, 162/76    (X) * Afebrile or fever improved    2022 14:19:46 EST by Yisel Pineda      afebrile    ( ) * No hypoxia on room air or oxygenation improved    2022 14:19:46 EST by Facundo Vazquez      % on 4L nc    ( ) * Mental status improved or at baseline    Activity    ( ) * Increased activity    Routes    ( ) Oral hydration    1/18/2022 14:19:46 EST by Facundo Vazquez      TPN, D5 50hr    ( ) Oral medications    1/18/2022 14:19:46 EST by Facundo Vazquez      po meds held due to NPO  ASA 81mg qd, Lipitor 20mg qd, Zoloft 100mg qd, Carafate 1g qid    ( ) Usual diet    1/18/2022 14:19:46 EST by Facundo Vazquez      NPO    Interventions    (X) Incentive spirometry    1/18/2022 14:19:46 EST by Facundo Vazquez      q2h    (X) Possible oxygen    1/18/2022 14:19:46 EST by Facundo Vazquez      4L nc; Bipap at hs    Medications    (X) IV or oral antibiotics    1/18/2022 14:19:46 EST by Facundo Vazquez      Rocephin 1g IV q24h    * Milestone   Additional Notes   1/18/22        -20, 140/73    RR max 46; HR max 112        WBC 11.7, Cl 110, Glu 199        Additional meds: Decadron 6mg IV q24h, Lovenox 40mg sc qd, Lantus 20u sc qd, SSI q4 (3u), Protonix 40mg IV bid        Orders: Droplet Plus Isolation, Fall Prec, CBC/BMP daily, Mg/Phos q MWF, Cardiac monitor         PULMONOLOGY       24 Hour events: off insulin and bicarb gtts. On 4L NC. Used CPAP QHS. She was able to give thumbs up for me, but was non-verbal. Later with nurse she was able to count to 5 verbally.       Constitutional:  in resp. distress   EENMT:  Sclera clear, pupils equal, oral mucosa moist   Respiratory:  rhonchi   Cardiovascular:  RRR   Gastrointestinal:  soft with no tenderness; positive bowel sounds present   Musculoskeletal:  warm with no cyanosis, no lower extremity edema   Skin:  no jaundice or ecchymosis   Neurologic:awake but does not follow commands, non verbal   Psychiatric: awake, alert, but not following commands and non-verbal      Impression: 61 y. o. female with recent fall and left humeral fx. Wearing sling and scheduled for outpatient follow up with Dr. Leopoldo Toribio.  BS > 1000 so insulin gtt initiated in the ICU. Required transfer to the ICU for tx of DKA/acidosis. Required bipap short term due to mental status.  Hospitalist managing DM. CT head on 1/14 negative. + e coli UTI - on Zosyn.        NEURO:  awake but non-verbal , not following commands   Acute metabolic encephalopathy: acidosis, UTI, COVID?, may be related to Jardiance and DKA   CV:    Volume Status:  Looks euvolemic   PULM:    Acute hypoxemic/hypercapneic respiratory failure: acidosis corrected on 4L NC now   JESSICA: CPAP QHS, either home or hospital machine pending O2 requirements   RENAL:   Metabolic acidosis: resolved, off bicarb gtt   Electrolytes: Na 149- on D5W. K+ supplemented, off bicarb gtt. GI:    Nutrition: none at present. Will need to hold po diet until she is more alert   HEME:    Anticoagulation: low dose lovenox   ID:    E coli UTI - on zosyn which organism is sensitive to. Completed Azithro   ENDO:    DM: doing well on SQ insulin, stop D5 if able to take PO or start TF   Skin: no decub, turns, preventive care   Prophy: lovenox/protonix         SLP      Patient continues to present with s/sx of oropharyngeal dysphagia characterized by overt coughing with swallow of thin liquids by tsp, mildly thick liquids by tsp, and puree. She is not a safe candidate for po intake at this time. Patient indicated use of thickened liquids in the past, but was unable to provide any additional information. Further chart review from outside hospitals revealed prior modified barium swallow study in 2015 without penetration or aspiration. History of multiple EGDs along with gastroparesis noted. Suspect acute encephalopathy is impacting oropharyngeal stage of swallow at this time. Recommended continued NPO with non-oral medication.  Given that mental status likely contributing to dysphagia and her mentation is improving will defer modified barium swallow study until later this week to allow time for continued improvements.        ID Impression:   · E coli UTI (1/12/22)   · Acute respiratory failure with hypoxia - COVID positive, 1/16 - on 6L via nasal cannula   · Sinusitis - presumed due to COVID   · Encephalopathy in the setting of DKA   · Asthma   · Type II DM; A1c 7.0   · CVA   · Obesity   · Left humeral head and neck fracture    · Stress Gastritis; coffee ground emesis; seen by GI       Plan:   · Treat with ceftriaxone 1gm q24hrs x 5 days as outlined, eot 1/19/22   · I suspect encephalopathy is primarily DKA related. Sara Krabbe can cause a DKA syndrome with normal sugars, and encephalopathy is commonly associated with this syndrome. · Low suspicion for fungal sinusitis at this time given her A1C.   · **Will sign off at this time. Please call with questions or concerns.           Anti-infectives:   · IV Zosyn (1/13-1/16)-CTX 1/17-   · IV Zithromax (1/13-1/15)   · IV Diflucan (1/15-1/15)              Pneumonia - Care Day 4 (1/15/2022) by John Shukla RN       Review Entered Review Status   1/17/2022 15:21 Completed      Criteria Review      Care Day: 4 Care Date: 1/15/2022 Level of Care: ICU    Guideline Day 2    Clinical Status    ( ) * No CO2 retention or acidosis    (X) * No requirement for mechanical ventilation    (X) * Hypotension absent    1/17/2022 15:21:39 EST by Lili Mccall      Blood pressure (!) 155/73, pulse (!) 104, temperature 99 °F (37.2 °C), resp.  rate 20, height 5' 2\" (1.575 m), weight 220 lb (99.8 kg), SpO2 98 %    ( ) * Afebrile or fever improved    ( ) * No hypoxia on room air or oxygenation improved    ( ) * Mental status improved or at baseline    Activity    ( ) * Increased activity    Interventions    (X) Possible oxygen    1/17/2022 15:21:39 EST by John Shukla      3L    Medications    (X) IV or oral antibiotics    1/17/2022 15:21:39 EST by Lili Mccall      Rocephin 1g IV q24hrs   zithromax 500mg IV x 1   Diflucan 200mg IV x 1   zosyn 3.375g IV x2    * Milestone Additional Notes   1/15/22          Ms. Kelsea Edwards has overall well controlled DM (hgb a1c 7.0) but was admitted with hypoxia, fever, and leukocytosis.  She was thought to have pneumonia and UTI, but doesn't look to have pneumonia now. Bro Barksdale has been poorly responsive and had a CT head which was notable for left maxillary sinusitis with a high density component which was interpreted as being consistent with a fungal infection. Stefany Many is no invasion of bone on imaging and no findings on skin, eye, or palate to suggest mucor.  She could possible have an aspergillus chronic sinusitis, but this really can't be diagnosed by CT scan. --stop fluconazole   --continue to treat E coli UTI.  Change to ceftriaxone 1 g daily   --stop azithromycin   --check fungitell and aspergillus ag from serum   Pulm MD note:   Impression: 57 y.o. female with recent fall and left humeral fx. Wearing sling and scheduled for outpatient follow up with Dr. Ashwin Cedeño. BS > 1000 so insulin gtt initiated in the ICU. Required transfer to the ICU for tx of DKA/acidosis. Required bipap short term due to mental status.  Hospitalist managing DM. CT head on 1/14 negative. + e coli UTI - on Zosyn.        NEURO:  Arousable and able to say one word but no converse.  Not able to follow commands either. Recent head CT negative. Avoid any sedation and hopefully she will improve neurologically now that UTI being treated and BS down. Sedation: NA   Analgesia: NA   Paralytics: NA   CV:    Volume Status: + 678 over past 24 hours.  IV fluids using D5W with recent DKA - now improving. Septic shock: NA   PULM:    Acute hypoxemic/hypercapneic respiratory failure:  Was on bipap for short period of time. Acidosis corrected and now off. Using 2 liters oxygen for support. CXR with some atx/infiltrates. JESSICA: uses home CPAP. CO2 only 32.  Using our bipap here but can have family bring her home CPAP and use this when available   RENAL:   PAVITHRA: NA   Lactic acidosis: NA Electrolytes: Na elevated - on D5W. K+ supplemented   GI:    Nutrition: none at present. Will need to hold po diet until she is more alert   HEME:    Anemia:NA   Thrombocytopenia: NA   Anticoagulation: low dose lovenox   ID:    E coli UTI - on zosyn which organism is sensitive to. Also using Zithromax with infiltrates on CXR - ? PNA   ENDO:    DM:  now. Almost off insulin gtt.  hospiatlist managing.     Skin: no decub, turns, preventive care   Prophy: lovenox/protonix   WBC 17.0*    HGB 12.4    HCT 38.4    *       01/15/22   0850    *    K 3.2*       Meds: Symbicort 2 puffs IN 2x/d, lovenox 40mg SC qd protonix 40mg IV 2x/d  mag sulfate 2g IV  KCL 20meq IV x 2  D5@ 151cc/hr IV  Novolin R 100u IV gtt to titrate  , KCL 20meq IV x 4

## 2022-01-27 LAB
BACTERIA SPEC CULT: NORMAL
SERVICE CMNT-IMP: NORMAL

## 2022-06-16 NOTE — PROGRESS NOTES
Reviewed pt skin with Jennifer Gerardo RN, transferring pt from [facility]. Skin intact except for abrasion over left kneecap. Infectious Disease Wound Care